# Patient Record
Sex: FEMALE | Race: WHITE | Employment: OTHER | URBAN - METROPOLITAN AREA
[De-identification: names, ages, dates, MRNs, and addresses within clinical notes are randomized per-mention and may not be internally consistent; named-entity substitution may affect disease eponyms.]

---

## 2018-12-01 ENCOUNTER — HOSPITAL ENCOUNTER (OUTPATIENT)
Dept: MRI IMAGING | Age: 73
Discharge: HOME | End: 2018-12-03
Payer: MEDICARE

## 2018-12-01 ENCOUNTER — HOSPITAL ENCOUNTER (OUTPATIENT)
Age: 73
Discharge: HOME | End: 2018-12-01
Payer: MEDICARE

## 2018-12-01 DIAGNOSIS — K86.9 LESION OF PANCREAS: ICD-10-CM

## 2018-12-01 LAB
BUN BLDV-MCNC: 12 MG/DL (ref 8–23)
CREAT SERPL-MCNC: 0.8 MG/DL (ref 0.5–1)
GFR AFRICAN AMERICAN: >60
GFR NON-AFRICAN AMERICAN: >60 ML/MIN/1.73

## 2018-12-01 PROCEDURE — 84520 ASSAY OF UREA NITROGEN: CPT

## 2018-12-01 PROCEDURE — 82565 ASSAY OF CREATININE: CPT

## 2018-12-01 PROCEDURE — 74181 MRI ABDOMEN W/O CONTRAST: CPT

## 2018-12-01 PROCEDURE — 36415 COLL VENOUS BLD VENIPUNCTURE: CPT

## 2019-07-09 ENCOUNTER — HOSPITAL ENCOUNTER (INPATIENT)
Age: 74
LOS: 2 days | Discharge: PSYCHIATRIC OR SUBSTANCE USE FACILITY | DRG: 312 | End: 2019-07-11
Attending: EMERGENCY MEDICINE | Admitting: INTERNAL MEDICINE
Payer: MEDICARE

## 2019-07-09 ENCOUNTER — APPOINTMENT (OUTPATIENT)
Dept: CT IMAGING | Age: 74
DRG: 312 | End: 2019-07-09
Payer: MEDICARE

## 2019-07-09 ENCOUNTER — APPOINTMENT (OUTPATIENT)
Dept: GENERAL RADIOLOGY | Age: 74
DRG: 312 | End: 2019-07-09
Payer: MEDICARE

## 2019-07-09 DIAGNOSIS — R41.82 ALTERED MENTAL STATUS, UNSPECIFIED ALTERED MENTAL STATUS TYPE: Primary | ICD-10-CM

## 2019-07-09 DIAGNOSIS — R55 SYNCOPE AND COLLAPSE: ICD-10-CM

## 2019-07-09 LAB
ALBUMIN SERPL-MCNC: 4.4 G/DL (ref 3.5–5.2)
ALP BLD-CCNC: 107 U/L (ref 35–104)
ALT SERPL-CCNC: 16 U/L (ref 0–32)
AMORPHOUS: NORMAL
ANION GAP SERPL CALCULATED.3IONS-SCNC: 14 MMOL/L (ref 7–16)
APTT: 31.2 SEC (ref 24.5–35.1)
AST SERPL-CCNC: 32 U/L (ref 0–31)
BACTERIA: NORMAL /HPF
BASOPHILS ABSOLUTE: 0.03 E9/L (ref 0–0.2)
BASOPHILS RELATIVE PERCENT: 0.4 % (ref 0–2)
BILIRUB SERPL-MCNC: 0.9 MG/DL (ref 0–1.2)
BILIRUBIN URINE: NEGATIVE
BLOOD, URINE: ABNORMAL
BUN BLDV-MCNC: 14 MG/DL (ref 8–23)
CALCIUM SERPL-MCNC: 10.2 MG/DL (ref 8.6–10.2)
CHLORIDE BLD-SCNC: 102 MMOL/L (ref 98–107)
CLARITY: CLEAR
CO2: 25 MMOL/L (ref 22–29)
COLOR: YELLOW
CREAT SERPL-MCNC: 0.8 MG/DL (ref 0.5–1)
EOSINOPHILS ABSOLUTE: 0.09 E9/L (ref 0.05–0.5)
EOSINOPHILS RELATIVE PERCENT: 1.1 % (ref 0–6)
GFR AFRICAN AMERICAN: >60
GFR NON-AFRICAN AMERICAN: >60 ML/MIN/1.73
GLUCOSE BLD-MCNC: 140 MG/DL (ref 74–99)
GLUCOSE URINE: NEGATIVE MG/DL
HCT VFR BLD CALC: 39.6 % (ref 34–48)
HEMOGLOBIN: 13.7 G/DL (ref 11.5–15.5)
IMMATURE GRANULOCYTES #: 0.02 E9/L
IMMATURE GRANULOCYTES %: 0.2 % (ref 0–5)
INR BLD: 1.2
KETONES, URINE: ABNORMAL MG/DL
LACTIC ACID, SEPSIS: 1.2 MMOL/L (ref 0.5–1.9)
LEUKOCYTE ESTERASE, URINE: NEGATIVE
LIPASE: 15 U/L (ref 13–60)
LYMPHOCYTES ABSOLUTE: 1.94 E9/L (ref 1.5–4)
LYMPHOCYTES RELATIVE PERCENT: 23.5 % (ref 20–42)
MCH RBC QN AUTO: 32.9 PG (ref 26–35)
MCHC RBC AUTO-ENTMCNC: 34.6 % (ref 32–34.5)
MCV RBC AUTO: 95.2 FL (ref 80–99.9)
MONOCYTES ABSOLUTE: 0.49 E9/L (ref 0.1–0.95)
MONOCYTES RELATIVE PERCENT: 5.9 % (ref 2–12)
NEUTROPHILS ABSOLUTE: 5.68 E9/L (ref 1.8–7.3)
NEUTROPHILS RELATIVE PERCENT: 68.9 % (ref 43–80)
NITRITE, URINE: NEGATIVE
PDW BLD-RTO: 12.5 FL (ref 11.5–15)
PH UA: 5.5 (ref 5–9)
PLATELET # BLD: 249 E9/L (ref 130–450)
PMV BLD AUTO: 10.2 FL (ref 7–12)
POTASSIUM REFLEX MAGNESIUM: 3.7 MMOL/L (ref 3.5–5)
PROTEIN UA: NEGATIVE MG/DL
PROTHROMBIN TIME: 14.1 SEC (ref 9.3–12.4)
RBC # BLD: 4.16 E12/L (ref 3.5–5.5)
RBC UA: NORMAL /HPF (ref 0–2)
SODIUM BLD-SCNC: 141 MMOL/L (ref 132–146)
SPECIFIC GRAVITY UA: 1.01 (ref 1–1.03)
TOTAL PROTEIN: 7.2 G/DL (ref 6.4–8.3)
TROPONIN: <0.01 NG/ML (ref 0–0.03)
UROBILINOGEN, URINE: 1 E.U./DL
WBC # BLD: 8.3 E9/L (ref 4.5–11.5)
WBC UA: NORMAL /HPF (ref 0–5)

## 2019-07-09 PROCEDURE — 71275 CT ANGIOGRAPHY CHEST: CPT

## 2019-07-09 PROCEDURE — 71045 X-RAY EXAM CHEST 1 VIEW: CPT

## 2019-07-09 PROCEDURE — 87040 BLOOD CULTURE FOR BACTERIA: CPT

## 2019-07-09 PROCEDURE — 74177 CT ABD & PELVIS W/CONTRAST: CPT

## 2019-07-09 PROCEDURE — 85025 COMPLETE CBC W/AUTO DIFF WBC: CPT

## 2019-07-09 PROCEDURE — 2580000003 HC RX 258: Performed by: EMERGENCY MEDICINE

## 2019-07-09 PROCEDURE — 85730 THROMBOPLASTIN TIME PARTIAL: CPT

## 2019-07-09 PROCEDURE — 80053 COMPREHEN METABOLIC PANEL: CPT

## 2019-07-09 PROCEDURE — 93005 ELECTROCARDIOGRAM TRACING: CPT | Performed by: EMERGENCY MEDICINE

## 2019-07-09 PROCEDURE — 81001 URINALYSIS AUTO W/SCOPE: CPT

## 2019-07-09 PROCEDURE — 83690 ASSAY OF LIPASE: CPT

## 2019-07-09 PROCEDURE — 99285 EMERGENCY DEPT VISIT HI MDM: CPT

## 2019-07-09 PROCEDURE — 2580000003 HC RX 258: Performed by: RADIOLOGY

## 2019-07-09 PROCEDURE — 83605 ASSAY OF LACTIC ACID: CPT

## 2019-07-09 PROCEDURE — 85610 PROTHROMBIN TIME: CPT

## 2019-07-09 PROCEDURE — 70450 CT HEAD/BRAIN W/O DYE: CPT

## 2019-07-09 PROCEDURE — 84484 ASSAY OF TROPONIN QUANT: CPT

## 2019-07-09 PROCEDURE — 36415 COLL VENOUS BLD VENIPUNCTURE: CPT

## 2019-07-09 PROCEDURE — 6360000004 HC RX CONTRAST MEDICATION: Performed by: RADIOLOGY

## 2019-07-09 PROCEDURE — 2060000000 HC ICU INTERMEDIATE R&B

## 2019-07-09 RX ORDER — SODIUM CHLORIDE 0.9 % (FLUSH) 0.9 %
10 SYRINGE (ML) INJECTION PRN
Status: DISCONTINUED | OUTPATIENT
Start: 2019-07-09 | End: 2019-07-11 | Stop reason: HOSPADM

## 2019-07-09 RX ORDER — LEVOTHYROXINE SODIUM 0.07 MG/1
75 TABLET ORAL DAILY
Status: DISCONTINUED | OUTPATIENT
Start: 2019-07-10 | End: 2019-07-11 | Stop reason: HOSPADM

## 2019-07-09 RX ORDER — 0.9 % SODIUM CHLORIDE 0.9 %
1000 INTRAVENOUS SOLUTION INTRAVENOUS ONCE
Status: COMPLETED | OUTPATIENT
Start: 2019-07-09 | End: 2019-07-09

## 2019-07-09 RX ORDER — MULTIVITAMIN WITH FOLIC ACID 400 MCG
1 TABLET ORAL DAILY
Status: DISCONTINUED | OUTPATIENT
Start: 2019-07-10 | End: 2019-07-11 | Stop reason: HOSPADM

## 2019-07-09 RX ORDER — PANTOPRAZOLE SODIUM 40 MG/1
40 TABLET, DELAYED RELEASE ORAL
Status: DISCONTINUED | OUTPATIENT
Start: 2019-07-10 | End: 2019-07-11 | Stop reason: HOSPADM

## 2019-07-09 RX ORDER — DEXTROSE MONOHYDRATE 50 MG/ML
100 INJECTION, SOLUTION INTRAVENOUS PRN
Status: DISCONTINUED | OUTPATIENT
Start: 2019-07-09 | End: 2019-07-11 | Stop reason: HOSPADM

## 2019-07-09 RX ORDER — INSULIN GLARGINE 100 [IU]/ML
4 INJECTION, SOLUTION SUBCUTANEOUS DAILY
Status: DISCONTINUED | OUTPATIENT
Start: 2019-07-10 | End: 2019-07-11 | Stop reason: HOSPADM

## 2019-07-09 RX ORDER — LISINOPRIL AND HYDROCHLOROTHIAZIDE 25; 20 MG/1; MG/1
1 TABLET ORAL DAILY
Status: DISCONTINUED | OUTPATIENT
Start: 2019-07-10 | End: 2019-07-09 | Stop reason: SDUPTHER

## 2019-07-09 RX ORDER — SODIUM CHLORIDE 9 MG/ML
INJECTION, SOLUTION INTRAVENOUS CONTINUOUS
Status: DISCONTINUED | OUTPATIENT
Start: 2019-07-09 | End: 2019-07-10

## 2019-07-09 RX ORDER — DEXTROSE MONOHYDRATE 25 G/50ML
12.5 INJECTION, SOLUTION INTRAVENOUS PRN
Status: DISCONTINUED | OUTPATIENT
Start: 2019-07-09 | End: 2019-07-11 | Stop reason: HOSPADM

## 2019-07-09 RX ORDER — NICOTINE POLACRILEX 4 MG
15 LOZENGE BUCCAL PRN
Status: DISCONTINUED | OUTPATIENT
Start: 2019-07-09 | End: 2019-07-11 | Stop reason: HOSPADM

## 2019-07-09 RX ORDER — ONDANSETRON 4 MG/1
4 TABLET, FILM COATED ORAL EVERY 8 HOURS PRN
Status: DISCONTINUED | OUTPATIENT
Start: 2019-07-09 | End: 2019-07-11 | Stop reason: HOSPADM

## 2019-07-09 RX ORDER — HYDROCHLOROTHIAZIDE 25 MG/1
25 TABLET ORAL DAILY
Status: DISCONTINUED | OUTPATIENT
Start: 2019-07-10 | End: 2019-07-11 | Stop reason: HOSPADM

## 2019-07-09 RX ORDER — LISINOPRIL 20 MG/1
20 TABLET ORAL DAILY
Status: DISCONTINUED | OUTPATIENT
Start: 2019-07-10 | End: 2019-07-11 | Stop reason: HOSPADM

## 2019-07-09 RX ADMIN — IOPAMIDOL 110 ML: 755 INJECTION, SOLUTION INTRAVENOUS at 17:42

## 2019-07-09 RX ADMIN — Medication 10 ML: at 17:42

## 2019-07-09 RX ADMIN — SODIUM CHLORIDE 1000 ML: 9 INJECTION, SOLUTION INTRAVENOUS at 16:45

## 2019-07-09 ASSESSMENT — PAIN DESCRIPTION - ORIENTATION: ORIENTATION: LOWER

## 2019-07-09 ASSESSMENT — ENCOUNTER SYMPTOMS
DIARRHEA: 1
ABDOMINAL PAIN: 1
RESPIRATORY NEGATIVE: 1

## 2019-07-09 ASSESSMENT — PAIN SCALES - GENERAL
PAINLEVEL_OUTOF10: 9
PAINLEVEL_OUTOF10: 9

## 2019-07-09 ASSESSMENT — PAIN DESCRIPTION - PAIN TYPE: TYPE: CHRONIC PAIN

## 2019-07-09 ASSESSMENT — PAIN DESCRIPTION - LOCATION
LOCATION: ABDOMEN
LOCATION: BACK

## 2019-07-09 NOTE — ED PROVIDER NOTES
69 y.o female with history of HTN and DM presents for altered mental status. Family states she has been acting confused. She is alert, but her conversations don't make sense. Pt family also reports multiple syncopal episodes over the last few days. They state she has had an unintentional weight loss over the last year and has not been eating much. Pt also complains of abdominal pain and diarrhea. She has history of open heart surgery, cholecystectomy, and appendectomy. She is a smoker. Pt denies fever, lightheadedness, hematochezia, nausea, vomiting, dysuria, constipation, vaginal bleeding or discharge,  headache, cough. The history is provided by the patient. No  was used. Abdominal Pain   Pain location:  Generalized  Pain radiates to:  Does not radiate  Pain severity:  Moderate  Chronicity:  New  Context: diet changes    Context: not eating    Associated symptoms: diarrhea    Altered Mental Status   Presenting symptoms: behavior changes and confusion    Severity:  Moderate  Most recent episode: Today  Episode history:  Continuous  Duration:  2 days  Timing:  Constant  Progression:  Unchanged  Chronicity:  New  Associated symptoms: abdominal pain and decreased appetite    Loss of Consciousness   Episode history:  Multiple  Progression:  Unchanged  Chronicity:  New  Witnessed: yes    Associated symptoms: confusion        Review of Systems   Constitutional: Positive for decreased appetite. Respiratory: Negative. Cardiovascular: Positive for syncope. Gastrointestinal: Positive for abdominal pain and diarrhea. Genitourinary: Negative. Psychiatric/Behavioral: Positive for confusion. All other systems reviewed and are negative. Physical Exam   Constitutional: She appears distressed (mild). Weak, lethargic. Eyes: Pupils are equal, round, and reactive to light. EOM are normal.   Cardiovascular: Regular rhythm, normal heart sounds and intact distal pulses.  Exam reveals no gallop and no friction rub. No murmur heard. Tachycardic. Pulmonary/Chest: Effort normal and breath sounds normal. No stridor. No respiratory distress. She has no wheezes. She has no rales. Abdominal: Soft. Bowel sounds are normal. She exhibits no distension and no mass. There is no tenderness (diffuse). There is no rebound and no guarding. No hernia. Musculoskeletal:   Right lower leg decreased ROM from prior injury. Neurological: She is alert. No cranial nerve deficit or sensory deficit. Coordination normal.   Oriented to time, place, but not person. Procedures    MDM  Number of Diagnoses or Management Options  Altered mental status, unspecified altered mental status type: new and requires workup  Syncope and collapse: new and requires workup  Diagnosis management comments: Differential diagnoses include syncope, altered mental status, UTI, CVA, TIA, artery disease, MI. Amount and/or Complexity of Data Reviewed  Clinical lab tests: reviewed and ordered  Tests in the radiology section of CPT®: reviewed and ordered  Tests in the medicine section of CPT®: ordered and reviewed  Discuss the patient with other providers: yes (Case discussed with . Will admit to service.)  Independent visualization of images, tracings, or specimens: yes    Risk of Complications, Morbidity, and/or Mortality  Presenting problems: high  Diagnostic procedures: high  Management options: high  General comments: Patient remained stable throughout the course of treatment. IV fluid was given. Labs were essentially unremarkable. CAT scan of the head was negative. Case discussed with admitting doctor. Will admit the patient.              Labs      Radiology      EKG Interpretation.          --------------------------------------------- PAST HISTORY ---------------------------------------------  Past Medical History:  has a past medical history of Arthritis, Depression, Diabetes mellitus ([address] and Calcium 10.2 8.6 - 10.2 mg/dL    Total Protein 7.2 6.4 - 8.3 g/dL    Alb 4.4 3.5 - 5.2 g/dL    Total Bilirubin 0.9 0.0 - 1.2 mg/dL    Alkaline Phosphatase 107 (H) 35 - 104 U/L    ALT 16 0 - 32 U/L    AST 32 (H) 0 - 31 U/L   Lipase   Result Value Ref Range    Lipase 15 13 - 60 U/L   Urinalysis, reflex to microscopic   Result Value Ref Range    Color, UA Yellow Straw/Yellow    Clarity, UA Clear Clear    Glucose, Ur Negative Negative mg/dL    Bilirubin Urine Negative Negative    Ketones, Urine TRACE (A) Negative mg/dL    Specific Gravity, UA 1.010 1.005 - 1.030    Blood, Urine SMALL (A) Negative    pH, UA 5.5 5.0 - 9.0    Protein, UA Negative Negative mg/dL    Urobilinogen, Urine 1.0 <2.0 E.U./dL    Nitrite, Urine Negative Negative    Leukocyte Esterase, Urine Negative Negative   Troponin   Result Value Ref Range    Troponin <0.01 0.00 - 0.03 ng/mL   Protime-INR   Result Value Ref Range    Protime 14.1 (H) 9.3 - 12.4 sec    INR 1.2    APTT   Result Value Ref Range    aPTT 31.2 24.5 - 35.1 sec   Lactate, Sepsis   Result Value Ref Range    Lactic Acid, Sepsis 1.2 0.5 - 1.9 mmol/L   Microscopic Urinalysis   Result Value Ref Range    WBC, UA NONE 0 - 5 /HPF    RBC, UA 2-5 0 - 2 /HPF    Bacteria, UA NONE /HPF    Amorphous, UA FEW    POCT Glucose   Result Value Ref Range    Meter Glucose 83 74 - 99 mg/dL       RADIOLOGY:  CT Head WO Contrast   Final Result   Mild atrophy and chronic microvascular ischemic disease. CT ABDOMEN PELVIS W IV CONTRAST Additional Contrast? None   Final Result   No central pulmonary embolism or aortic dissection. There is COPD and   coronary artery calcification. Likely liver disease with portal venous hypertension and persistent   hemangioma in the left hepatic lobe. A low-attenuation lesion is also   identified in the pancreatic head which is unchanged and surveillance   recommended. Redundant colon with constipation.  Small amount of air is identified   in the periphery of ascending colon likely trapped air between the   fecal matter in the bowel. Pneumatosis is less likely. If colon needs   further assessment consider colonoscopy. CTA CHEST W CONTRAST   Final Result   No central pulmonary embolism or aortic dissection. There is COPD and   coronary artery calcification. Likely liver disease with portal venous hypertension and persistent   hemangioma in the left hepatic lobe. A low-attenuation lesion is also   identified in the pancreatic head which is unchanged and surveillance   recommended. Redundant colon with constipation. Small amount of air is identified   in the periphery of ascending colon likely trapped air between the   fecal matter in the bowel. Pneumatosis is less likely. If colon needs   further assessment consider colonoscopy. XR CHEST PORTABLE   Final Result   No acute process                   EKG:  This EKG is signed and interpreted by me.        ------------------------- NURSING NOTES AND VITALS REVIEWED ---------------------------  Date / Time Roomed:  7/9/2019  3:50 PM  ED Bed Assignment:  [id number]/[id number]    The nursing notes within the ED encounter and vital signs as below have been reviewed.      Patient Vitals for the past 24 hrs:   BP Temp Temp src Pulse Resp SpO2 Height Weight   07/09/19 2330 136/65 98 °F (36.7 °C) Temporal 59 16 100 % -- --   07/09/19 2248 126/67 98.4 °F (36.9 °C) -- 57 14 95 % -- --   07/09/19 1939 (!) 144/72 98.4 °F (36.9 °C) Oral 59 16 99 % -- --   07/09/19 1557 125/79 97 °F (36.1 °C) -- 75 18 96 % 5' (1.524 m) 113 lb (51.3 kg)       Oxygen Saturation Interpretation: Normal    ------------------------------------------ PROGRESS NOTES ------------------------------------------  Re-evaluation(s):  Time:   Patients symptoms show no change  Repeat physical examination is not changed    Counseling:  I have spoken with the patient and discussed todays results, in addition to providing specific details for the plan of

## 2019-07-10 LAB
ANION GAP SERPL CALCULATED.3IONS-SCNC: 12 MMOL/L (ref 7–16)
BASOPHILS ABSOLUTE: 0.04 E9/L (ref 0–0.2)
BASOPHILS RELATIVE PERCENT: 0.6 % (ref 0–2)
BUN BLDV-MCNC: 14 MG/DL (ref 8–23)
CALCIUM SERPL-MCNC: 9 MG/DL (ref 8.6–10.2)
CHLORIDE BLD-SCNC: 105 MMOL/L (ref 98–107)
CO2: 25 MMOL/L (ref 22–29)
CREAT SERPL-MCNC: 0.7 MG/DL (ref 0.5–1)
EKG ATRIAL RATE: 61 BPM
EKG P AXIS: 79 DEGREES
EKG P-R INTERVAL: 196 MS
EKG Q-T INTERVAL: 416 MS
EKG QRS DURATION: 72 MS
EKG QTC CALCULATION (BAZETT): 418 MS
EKG R AXIS: 73 DEGREES
EKG T AXIS: 77 DEGREES
EKG VENTRICULAR RATE: 61 BPM
EOSINOPHILS ABSOLUTE: 0.29 E9/L (ref 0.05–0.5)
EOSINOPHILS RELATIVE PERCENT: 4.2 % (ref 0–6)
GFR AFRICAN AMERICAN: >60
GFR NON-AFRICAN AMERICAN: >60 ML/MIN/1.73
GLUCOSE BLD-MCNC: 83 MG/DL (ref 74–99)
HCT VFR BLD CALC: 36.3 % (ref 34–48)
HEMOGLOBIN: 12.4 G/DL (ref 11.5–15.5)
IMMATURE GRANULOCYTES #: 0.01 E9/L
IMMATURE GRANULOCYTES %: 0.1 % (ref 0–5)
LYMPHOCYTES ABSOLUTE: 1.93 E9/L (ref 1.5–4)
LYMPHOCYTES RELATIVE PERCENT: 27.8 % (ref 20–42)
MCH RBC QN AUTO: 32.4 PG (ref 26–35)
MCHC RBC AUTO-ENTMCNC: 34.2 % (ref 32–34.5)
MCV RBC AUTO: 94.8 FL (ref 80–99.9)
METER GLUCOSE: 79 MG/DL (ref 74–99)
METER GLUCOSE: 80 MG/DL (ref 74–99)
METER GLUCOSE: 81 MG/DL (ref 74–99)
METER GLUCOSE: 83 MG/DL (ref 74–99)
METER GLUCOSE: 92 MG/DL (ref 74–99)
MONOCYTES ABSOLUTE: 0.53 E9/L (ref 0.1–0.95)
MONOCYTES RELATIVE PERCENT: 7.6 % (ref 2–12)
NEUTROPHILS ABSOLUTE: 4.13 E9/L (ref 1.8–7.3)
NEUTROPHILS RELATIVE PERCENT: 59.7 % (ref 43–80)
PDW BLD-RTO: 12.6 FL (ref 11.5–15)
PLATELET # BLD: 236 E9/L (ref 130–450)
PMV BLD AUTO: 10.3 FL (ref 7–12)
POTASSIUM SERPL-SCNC: 3.1 MMOL/L (ref 3.5–5)
RBC # BLD: 3.83 E12/L (ref 3.5–5.5)
SODIUM BLD-SCNC: 142 MMOL/L (ref 132–146)
WBC # BLD: 6.9 E9/L (ref 4.5–11.5)

## 2019-07-10 PROCEDURE — 2060000000 HC ICU INTERMEDIATE R&B

## 2019-07-10 PROCEDURE — 99221 1ST HOSP IP/OBS SF/LOW 40: CPT | Performed by: PSYCHIATRY & NEUROLOGY

## 2019-07-10 PROCEDURE — 36415 COLL VENOUS BLD VENIPUNCTURE: CPT

## 2019-07-10 PROCEDURE — 85025 COMPLETE CBC W/AUTO DIFF WBC: CPT

## 2019-07-10 PROCEDURE — 51702 INSERT TEMP BLADDER CATH: CPT

## 2019-07-10 PROCEDURE — 87040 BLOOD CULTURE FOR BACTERIA: CPT

## 2019-07-10 PROCEDURE — 93010 ELECTROCARDIOGRAM REPORT: CPT | Performed by: INTERNAL MEDICINE

## 2019-07-10 PROCEDURE — 2580000003 HC RX 258: Performed by: INTERNAL MEDICINE

## 2019-07-10 PROCEDURE — 82962 GLUCOSE BLOOD TEST: CPT

## 2019-07-10 PROCEDURE — 6370000000 HC RX 637 (ALT 250 FOR IP): Performed by: INTERNAL MEDICINE

## 2019-07-10 PROCEDURE — 6360000002 HC RX W HCPCS: Performed by: INTERNAL MEDICINE

## 2019-07-10 PROCEDURE — 80048 BASIC METABOLIC PNL TOTAL CA: CPT

## 2019-07-10 RX ORDER — ARIPIPRAZOLE 5 MG/1
5 TABLET ORAL DAILY
Status: ON HOLD | COMMUNITY
End: 2021-12-20 | Stop reason: HOSPADM

## 2019-07-10 RX ORDER — ASPIRIN 81 MG/1
81 TABLET, CHEWABLE ORAL DAILY
Status: DISCONTINUED | OUTPATIENT
Start: 2019-07-10 | End: 2019-07-11 | Stop reason: HOSPADM

## 2019-07-10 RX ORDER — DICYCLOMINE HYDROCHLORIDE 10 MG/1
10 CAPSULE ORAL
COMMUNITY

## 2019-07-10 RX ORDER — BACLOFEN 20 MG/1
20 TABLET ORAL NIGHTLY
COMMUNITY
End: 2021-06-14

## 2019-07-10 RX ORDER — BUSPIRONE HYDROCHLORIDE 15 MG/1
7.5 TABLET ORAL 3 TIMES DAILY
Status: ON HOLD | COMMUNITY
End: 2021-12-20 | Stop reason: HOSPADM

## 2019-07-10 RX ORDER — METAXALONE 800 MG/1
800 TABLET ORAL 3 TIMES DAILY
Status: ON HOLD | COMMUNITY
End: 2019-07-11 | Stop reason: HOSPADM

## 2019-07-10 RX ORDER — METOPROLOL SUCCINATE 25 MG/1
25 TABLET, EXTENDED RELEASE ORAL DAILY
Status: DISCONTINUED | OUTPATIENT
Start: 2019-07-10 | End: 2019-07-11 | Stop reason: HOSPADM

## 2019-07-10 RX ORDER — ATORVASTATIN CALCIUM 20 MG/1
20 TABLET, FILM COATED ORAL DAILY
COMMUNITY
End: 2021-06-14

## 2019-07-10 RX ORDER — SODIUM CHLORIDE AND POTASSIUM CHLORIDE .9; .15 G/100ML; G/100ML
SOLUTION INTRAVENOUS CONTINUOUS
Status: DISCONTINUED | OUTPATIENT
Start: 2019-07-10 | End: 2019-07-11 | Stop reason: HOSPADM

## 2019-07-10 RX ORDER — ATORVASTATIN CALCIUM 10 MG/1
20 TABLET, FILM COATED ORAL DAILY
Status: DISCONTINUED | OUTPATIENT
Start: 2019-07-10 | End: 2019-07-11 | Stop reason: HOSPADM

## 2019-07-10 RX ORDER — LORAZEPAM 0.5 MG/1
0.5 TABLET ORAL 2 TIMES DAILY
Status: ON HOLD | COMMUNITY
End: 2019-07-11 | Stop reason: HOSPADM

## 2019-07-10 RX ORDER — METOPROLOL SUCCINATE 25 MG/1
25 TABLET, EXTENDED RELEASE ORAL DAILY
COMMUNITY

## 2019-07-10 RX ORDER — BACLOFEN 10 MG/1
20 TABLET ORAL NIGHTLY
Status: DISCONTINUED | OUTPATIENT
Start: 2019-07-10 | End: 2019-07-11 | Stop reason: HOSPADM

## 2019-07-10 RX ORDER — POTASSIUM CHLORIDE 20 MEQ/1
20 TABLET, EXTENDED RELEASE ORAL ONCE
Status: COMPLETED | OUTPATIENT
Start: 2019-07-10 | End: 2019-07-10

## 2019-07-10 RX ADMIN — BACLOFEN 20 MG: 10 TABLET ORAL at 23:10

## 2019-07-10 RX ADMIN — LISINOPRIL 20 MG: 20 TABLET ORAL at 08:06

## 2019-07-10 RX ADMIN — POTASSIUM CHLORIDE 20 MEQ: 20 TABLET, EXTENDED RELEASE ORAL at 10:11

## 2019-07-10 RX ADMIN — HYDROCHLOROTHIAZIDE 25 MG: 25 TABLET ORAL at 08:06

## 2019-07-10 RX ADMIN — LEVOTHYROXINE SODIUM 75 MCG: 75 TABLET ORAL at 06:00

## 2019-07-10 RX ADMIN — PANTOPRAZOLE SODIUM 40 MG: 40 TABLET, DELAYED RELEASE ORAL at 06:00

## 2019-07-10 RX ADMIN — SODIUM CHLORIDE: 9 INJECTION, SOLUTION INTRAVENOUS at 00:00

## 2019-07-10 RX ADMIN — MULTIVITAMIN TABLET 1 TABLET: TABLET at 08:06

## 2019-07-10 RX ADMIN — SODIUM CHLORIDE AND POTASSIUM CHLORIDE: .9; .15 SOLUTION INTRAVENOUS at 23:09

## 2019-07-10 RX ADMIN — PANCRELIPASE 3 CAPSULE: 60000; 12000; 38000 CAPSULE, DELAYED RELEASE PELLETS ORAL at 18:53

## 2019-07-10 RX ADMIN — SODIUM CHLORIDE AND POTASSIUM CHLORIDE: .9; .15 SOLUTION INTRAVENOUS at 10:11

## 2019-07-10 RX ADMIN — ATORVASTATIN CALCIUM 20 MG: 10 TABLET, FILM COATED ORAL at 15:19

## 2019-07-10 RX ADMIN — ASPIRIN 81 MG 81 MG: 81 TABLET ORAL at 15:19

## 2019-07-10 RX ADMIN — METOPROLOL SUCCINATE 25 MG: 25 TABLET, FILM COATED, EXTENDED RELEASE ORAL at 15:19

## 2019-07-10 ASSESSMENT — PAIN DESCRIPTION - LOCATION
LOCATION: BACK
LOCATION: BACK

## 2019-07-10 ASSESSMENT — PAIN DESCRIPTION - PAIN TYPE
TYPE: CHRONIC PAIN
TYPE: CHRONIC PAIN

## 2019-07-10 ASSESSMENT — PAIN SCALES - GENERAL
PAINLEVEL_OUTOF10: 10
PAINLEVEL_OUTOF10: 0
PAINLEVEL_OUTOF10: 10
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
## 2019-07-10 NOTE — PROGRESS NOTES
Pt did not void this shift, pt has c/o minimal lower abd discomfort, and has the sensation to urinate, but unable to. Bladder scan performed with result of 570ml. Will inform Dr Do Standing of results.

## 2019-07-10 NOTE — PROGRESS NOTES
Unable to confirm home medications with pt r/t her AMS, attempted to call , voicemail left. Did speak with pt's sister, she states she does not know what medications she takes, but will obtain the list in the morning and either call, or bring it here.

## 2019-07-10 NOTE — PROGRESS NOTES
Dr. Jenaro Cordero notified of patient's bladder scan results and patient still uable to urinate at the beginning of this shift.  Electronically signed by Yuan Lo RN on 7/10/2019 at 8:12 AM

## 2019-07-11 VITALS
SYSTOLIC BLOOD PRESSURE: 134 MMHG | BODY MASS INDEX: 24.35 KG/M2 | WEIGHT: 124 LBS | HEART RATE: 53 BPM | DIASTOLIC BLOOD PRESSURE: 65 MMHG | HEIGHT: 60 IN | RESPIRATION RATE: 16 BRPM | TEMPERATURE: 98.3 F | OXYGEN SATURATION: 98 %

## 2019-07-11 PROBLEM — R41.82 ALTERED MENTAL STATUS: Status: RESOLVED | Noted: 2019-07-09 | Resolved: 2019-07-11

## 2019-07-11 PROBLEM — E43 SEVERE PROTEIN-CALORIE MALNUTRITION (HCC): Chronic | Status: ACTIVE | Noted: 2019-07-11

## 2019-07-11 LAB
ANION GAP SERPL CALCULATED.3IONS-SCNC: 17 MMOL/L (ref 7–16)
BASOPHILS ABSOLUTE: 0.03 E9/L (ref 0–0.2)
BASOPHILS RELATIVE PERCENT: 0.3 % (ref 0–2)
BUN BLDV-MCNC: 13 MG/DL (ref 8–23)
CALCIUM SERPL-MCNC: 8.8 MG/DL (ref 8.6–10.2)
CHLORIDE BLD-SCNC: 103 MMOL/L (ref 98–107)
CO2: 20 MMOL/L (ref 22–29)
CREAT SERPL-MCNC: 0.7 MG/DL (ref 0.5–1)
EOSINOPHILS ABSOLUTE: 0.08 E9/L (ref 0.05–0.5)
EOSINOPHILS RELATIVE PERCENT: 0.8 % (ref 0–6)
GFR AFRICAN AMERICAN: >60
GFR NON-AFRICAN AMERICAN: >60 ML/MIN/1.73
GLUCOSE BLD-MCNC: 122 MG/DL (ref 74–99)
HCT VFR BLD CALC: 38.7 % (ref 34–48)
HEMOGLOBIN: 13.3 G/DL (ref 11.5–15.5)
IMMATURE GRANULOCYTES #: 0.05 E9/L
IMMATURE GRANULOCYTES %: 0.5 % (ref 0–5)
LYMPHOCYTES ABSOLUTE: 1.18 E9/L (ref 1.5–4)
LYMPHOCYTES RELATIVE PERCENT: 11.1 % (ref 20–42)
MCH RBC QN AUTO: 32.6 PG (ref 26–35)
MCHC RBC AUTO-ENTMCNC: 34.4 % (ref 32–34.5)
MCV RBC AUTO: 94.9 FL (ref 80–99.9)
METER GLUCOSE: 103 MG/DL (ref 74–99)
METER GLUCOSE: 110 MG/DL (ref 74–99)
METER GLUCOSE: 124 MG/DL (ref 74–99)
METER GLUCOSE: 151 MG/DL (ref 74–99)
METER GLUCOSE: 73 MG/DL (ref 74–99)
METER GLUCOSE: 99 MG/DL (ref 74–99)
MONOCYTES ABSOLUTE: 0.37 E9/L (ref 0.1–0.95)
MONOCYTES RELATIVE PERCENT: 3.5 % (ref 2–12)
NEUTROPHILS ABSOLUTE: 8.95 E9/L (ref 1.8–7.3)
NEUTROPHILS RELATIVE PERCENT: 83.8 % (ref 43–80)
PDW BLD-RTO: 12.5 FL (ref 11.5–15)
PLATELET # BLD: 261 E9/L (ref 130–450)
PMV BLD AUTO: 10.1 FL (ref 7–12)
POTASSIUM SERPL-SCNC: 3.6 MMOL/L (ref 3.5–5)
RBC # BLD: 4.08 E12/L (ref 3.5–5.5)
SODIUM BLD-SCNC: 140 MMOL/L (ref 132–146)
WBC # BLD: 10.7 E9/L (ref 4.5–11.5)

## 2019-07-11 PROCEDURE — 36415 COLL VENOUS BLD VENIPUNCTURE: CPT

## 2019-07-11 PROCEDURE — APPSS60 APP SPLIT SHARED TIME 46-60 MINUTES: Performed by: NURSE PRACTITIONER

## 2019-07-11 PROCEDURE — 99222 1ST HOSP IP/OBS MODERATE 55: CPT | Performed by: INTERNAL MEDICINE

## 2019-07-11 PROCEDURE — 80048 BASIC METABOLIC PNL TOTAL CA: CPT

## 2019-07-11 PROCEDURE — 85025 COMPLETE CBC W/AUTO DIFF WBC: CPT

## 2019-07-11 PROCEDURE — 82962 GLUCOSE BLOOD TEST: CPT

## 2019-07-11 PROCEDURE — 6370000000 HC RX 637 (ALT 250 FOR IP): Performed by: INTERNAL MEDICINE

## 2019-07-11 RX ADMIN — INSULIN GLARGINE 4 UNITS: 100 INJECTION, SOLUTION SUBCUTANEOUS at 09:15

## 2019-07-11 RX ADMIN — PANCRELIPASE 3 CAPSULE: 60000; 12000; 38000 CAPSULE, DELAYED RELEASE PELLETS ORAL at 12:16

## 2019-07-11 RX ADMIN — HYDROCHLOROTHIAZIDE 25 MG: 25 TABLET ORAL at 09:12

## 2019-07-11 RX ADMIN — PANTOPRAZOLE SODIUM 40 MG: 40 TABLET, DELAYED RELEASE ORAL at 06:16

## 2019-07-11 RX ADMIN — METOPROLOL SUCCINATE 25 MG: 25 TABLET, FILM COATED, EXTENDED RELEASE ORAL at 09:12

## 2019-07-11 RX ADMIN — PANCRELIPASE 3 CAPSULE: 60000; 12000; 38000 CAPSULE, DELAYED RELEASE PELLETS ORAL at 17:33

## 2019-07-11 RX ADMIN — LISINOPRIL 20 MG: 20 TABLET ORAL at 09:12

## 2019-07-11 RX ADMIN — PANCRELIPASE 3 CAPSULE: 60000; 12000; 38000 CAPSULE, DELAYED RELEASE PELLETS ORAL at 09:12

## 2019-07-11 RX ADMIN — LEVOTHYROXINE SODIUM 75 MCG: 75 TABLET ORAL at 06:16

## 2019-07-11 RX ADMIN — ATORVASTATIN CALCIUM 20 MG: 10 TABLET, FILM COATED ORAL at 09:11

## 2019-07-11 RX ADMIN — ASPIRIN 81 MG 81 MG: 81 TABLET ORAL at 09:10

## 2019-07-11 RX ADMIN — MULTIVITAMIN TABLET 1 TABLET: TABLET at 09:12

## 2019-07-11 ASSESSMENT — PAIN SCALES - GENERAL
PAINLEVEL_OUTOF10: 1
PAINLEVEL_OUTOF10: 0

## 2019-07-11 NOTE — PLAN OF CARE
Problem: Malnutrition  (NI-5.2)  Goal: Food and/or Nutrient Delivery  Description - Continue diet and monitor PO intake  Individualized approach for food/nutrient provision.   Outcome: Met This Shift

## 2019-07-11 NOTE — PROGRESS NOTES
Subjective:    Chief complaint:    Strange behavior continues per nursing    Objective:    /63   Pulse 70   Temp 96.4 °F (35.8 °C) (Oral)   Resp 16   Ht 5' (1.524 m)   Wt 113 lb (51.3 kg)   SpO2 98%   BMI 22.07 kg/m²   General : Awake ,alert,no distress. Heart:  RRR, no murmurs, gallops, or rubs. Lungs:  CTA bilaterally, no wheeze, rales or rhonchi  Abd: bowel sounds present, nontender, nondistended, no masses  Extrem:  No clubbing, cyanosis, or edema    CBC:   Lab Results   Component Value Date    WBC 10.7 07/11/2019    RBC 4.08 07/11/2019    HGB 13.3 07/11/2019    HCT 38.7 07/11/2019    MCV 94.9 07/11/2019    MCH 32.6 07/11/2019    MCHC 34.4 07/11/2019    RDW 12.5 07/11/2019     07/11/2019    MPV 10.1 07/11/2019     BMP:    Lab Results   Component Value Date     07/11/2019    K 3.6 07/11/2019    K 3.7 07/09/2019     07/11/2019    CO2 20 07/11/2019    BUN 13 07/11/2019    LABALBU 4.4 07/09/2019    CREATININE 0.7 07/11/2019    CALCIUM 8.8 07/11/2019    GFRAA >60 07/11/2019    LABGLOM >60 07/11/2019    GLUCOSE 122 07/11/2019     PT/INR:    Lab Results   Component Value Date    PROTIME 14.1 07/09/2019    INR 1.2 07/09/2019     Troponin:    Lab Results   Component Value Date    TROPONINI <0.01 07/09/2019       No results for input(s): LABURIN in the last 72 hours. Recent Labs     07/09/19  1602   BC 24 Hours- no growth     Recent Labs     07/10/19  0417   BLOODCULT2 24 Hours- no growth         Current Facility-Administered Medications:     perflutren lipid microspheres (DEFINITY) injection 1.65 mg, 1.5 mL, Intravenous, ONCE PRN, Vernel Goltz.  KATERINA Kearney    0.9% NaCl with KCl 20 mEq infusion, , Intravenous, Continuous, Angela Todd MD, Last Rate: 75 mL/hr at 07/10/19 2309    aspirin chewable tablet 81 mg, 81 mg, Oral, Daily, Angela Todd MD, 81 mg at 07/11/19 0910    atorvastatin (LIPITOR) tablet 20 mg, 20 mg, Oral, Daily, Angela Todd MD, 20 mg at 07/11/19 0911    baclofen (LIORESAL) tablet 20 mg, 20 mg, Oral, Nightly, Steve Diaz MD, 20 mg at 07/10/19 2310    lipase-protease-amylase (CREON) 40941 units delayed release capsule 3 capsule, 3 capsule, Oral, TID , Steve Diaz MD, 3 capsule at 07/11/19 0912    metoprolol succinate (TOPROL XL) extended release tablet 25 mg, 25 mg, Oral, Daily, Steve Diaz MD, 25 mg at 07/11/19 0912    sodium chloride flush 0.9 % injection 10 mL, 10 mL, Intravenous, PRN, Claudia Rodriguez II, MD, 10 mL at 07/09/19 1742    insulin glargine (LANTUS) injection vial 4 Units, 4 Units, Subcutaneous, Daily, Steve Diaz MD, 4 Units at 07/11/19 0915    levothyroxine (SYNTHROID) tablet 75 mcg, 75 mcg, Oral, Daily, Steve Diaz MD, 75 mcg at 07/11/19 0616    pantoprazole (PROTONIX) tablet 40 mg, 40 mg, Oral, QAM AC, Steve Diaz MD, 40 mg at 07/11/19 9311    multivitamin 1 tablet, 1 tablet, Oral, Daily, Steve Diaz MD, 1 tablet at 07/11/19 0912    ondansetron (ZOFRAN) tablet 4 mg, 4 mg, Oral, Q8H PRN, Steve Diaz MD    Liraglutide (VICTOZA) SC injection 1.2 mg, 1.2 mg, Subcutaneous, Daily, Steve Diaz MD    insulin lispro (HUMALOG) injection vial 0-12 Units, 0-12 Units, Subcutaneous, TID , Anirudh Saez MD    insulin lispro (HUMALOG) injection vial 0-6 Units, 0-6 Units, Subcutaneous, Nightly, Anirudh Guerrero MD    glucose (GLUTOSE) 40 % oral gel 15 g, 15 g, Oral, PRN, Steve Diaz MD    dextrose 50 % IV solution, 12.5 g, Intravenous, PRN, Steve Diaz MD    glucagon (rDNA) injection 1 mg, 1 mg, Intramuscular, PRN, Steve Diaz MD    dextrose 5 % solution, 100 mL/hr, Intravenous, PRN, Steve Diaz MD    lisinopril (PRINIVIL;ZESTRIL) tablet 20 mg, 20 mg, Oral, Daily, 20 mg at 07/11/19 0912 **AND** hydrochlorothiazide (HYDRODIURIL) tablet 25 mg, 25 mg, Oral, Daily, Steve Diaz MD, 25 mg at 07/11/19 0912    DIET CARB CONTROL;    CT Head WO Contrast   Final Result   Mild atrophy and chronic microvascular ischemic disease. CT ABDOMEN PELVIS W IV CONTRAST Additional Contrast? None   Final Result   No central pulmonary embolism or aortic dissection. There is COPD and   coronary artery calcification. Likely liver disease with portal venous hypertension and persistent   hemangioma in the left hepatic lobe. A low-attenuation lesion is also   identified in the pancreatic head which is unchanged and surveillance   recommended. Redundant colon with constipation. Small amount of air is identified   in the periphery of ascending colon likely trapped air between the   fecal matter in the bowel. Pneumatosis is less likely. If colon needs   further assessment consider colonoscopy. CTA CHEST W CONTRAST   Final Result   No central pulmonary embolism or aortic dissection. There is COPD and   coronary artery calcification. Likely liver disease with portal venous hypertension and persistent   hemangioma in the left hepatic lobe. A low-attenuation lesion is also   identified in the pancreatic head which is unchanged and surveillance   recommended. Redundant colon with constipation. Small amount of air is identified   in the periphery of ascending colon likely trapped air between the   fecal matter in the bowel. Pneumatosis is less likely. If colon needs   further assessment consider colonoscopy. XR CHEST PORTABLE   Final Result   No acute process                   Assessment:    Active Problems:    Syncope and collapse    Altered mental status  Resolved Problems:    * No resolved hospital problems.  *  Polypharmacy  History of bipolar disorder    Plan:    Psych input noted   If no psych unit admission will discharge her with close outpatient follow-up with her PCP  She will also need outpatient psychiatric follow-up          Anirudh Puga  12:00 PM  7/11/2019    NOTE:

## 2019-07-11 NOTE — PROGRESS NOTES
Spoke with daughter Dianna Quiles, about non-formulary diabetic med Victoza. She will call her brother-in-law and see if he can bring the home med tomorrow. Pharmacy called at this time as well. Jennifer Bey, , called / voicemail left - [phone]  Dianna Quiles, daughter, [phone]    Per pharmacy - if family can't bring it tomorrow 7/11/19, let  know. Pt is on sliding scale insulin as well.

## 2019-07-11 NOTE — PROGRESS NOTES
Answered patients call light and asked if the patient needed anything, she responded \"Should I just jump out of the window now or should I go pee? \" When I asked her why she would jump out the window she responded \"well I have already had my water for the day so im just waiting around. \" Re-educated patient about what a danielson cathter is and why a patient would have one. Unable to re-orient patient at this time. Will continue to monitor.

## 2019-07-11 NOTE — CARE COORDINATION
Social work received call from Imani He with [employer]. Received voluntary form. Patient for [facility] accepting physician is Dr. Snow Brannon. Social work set up transport for 7:30 pm by [address] ambulance. [facility] must be called prior to leaving ([phone] option 1). Facility, RN, and charge RN made aware. SW called to update spouse, no answer and lvm.   Electronically signed by MELLO Horner on 7/11/2019 at 3:26 PM

## 2019-07-11 NOTE — PROGRESS NOTES
Nutrition Assessment    Type and Reason for Visit: Initial, Positive Nutrition Screen    Nutrition Recommendations: Continue current diet    Nutrition Assessment: patient meets criteria for severe malnutrition AEB noted poor PO intakes PTA, muscle/fat wasting, and 30% wt loss x ~2 yrs. At further risk for compromise d/t AMS w/ expected ongoing poor PO intakes and pt refusing ONS at this time. Will contineu diet and monitor. Malnutrition Assessment:  · Malnutrition Status: Meets the criteria for severe malnutrition  · Context: Chronic illness  · Findings of the 6 clinical characteristics of malnutrition (Minimum of 2 out of 6 clinical characteristics is required to make the diagnosis of moderate or severe Protein Calorie Malnutrition based on AND/ASPEN Guidelines):  1. Energy Intake-Less than or equal to 75% of estimated energy requirement, Greater than or equal to 3 months    2. Weight Loss-20% loss or greater, (>1 yr)  3. Fat Loss-Moderate subcutaneous fat loss, Orbital, Triceps  4. Muscle Loss-Severe muscle mass loss, Temples (temporalis muscle), Clavicles (pectoralis and deltoids)  5. Fluid Accumulation-No significant fluid accumulation,    6.  Strength-Not measured    Nutrition Risk Level:  Moderate    Nutrient Needs:  · Estimated Daily Total Kcal: 2107-5304(MSJ 1000 x 1.2 SF)  · Estimated Daily Protein (g): 75-85(1.3-1.5 gm/kg)  · Estimated Daily Total Fluid (ml/day): 5276-3036(1 ml/kcal)    Nutrition Diagnosis:   · Problem: Severe malnutrition, In context of chronic illness  · Etiology: related to Insufficient energy/nutrient consumption     Signs and symptoms:  as evidenced by Intake 25-50%, Diet history of poor intake, Moderate loss of subcutaneous fat, Severe muscle loss    Objective Information:  · Nutrition-Focused Physical Findings: AMS, abd WDL, no noted edema, fluids WDL, noted hallucinations  · Wound Type: None  · Current Nutrition Therapies:  · Oral Diet Orders: Carb Control 4 Carbs/Meal
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
## 2019-07-14 LAB
BLOOD CULTURE, ROUTINE: NORMAL
CULTURE, BLOOD 2: NORMAL
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
## 2021-06-14 ENCOUNTER — APPOINTMENT (OUTPATIENT)
Dept: CT IMAGING | Age: 76
End: 2021-06-14
Payer: MEDICARE

## 2021-06-14 ENCOUNTER — HOSPITAL ENCOUNTER (INPATIENT)
Age: 76
LOS: 4 days | Discharge: HOME HEALTH | DRG: 690 | End: 2021-06-18
Attending: INTERNAL MEDICINE | Admitting: INTERNAL MEDICINE
Payer: MEDICARE

## 2021-06-14 ENCOUNTER — HOSPITAL ENCOUNTER (EMERGENCY)
Age: 76
Discharge: ANOTHER HOSPITAL | End: 2021-06-14
Attending: EMERGENCY MEDICINE
Payer: MEDICARE

## 2021-06-14 VITALS
WEIGHT: 170 LBS | HEART RATE: 68 BPM | SYSTOLIC BLOOD PRESSURE: 138 MMHG | BODY MASS INDEX: 33.38 KG/M2 | RESPIRATION RATE: 18 BRPM | DIASTOLIC BLOOD PRESSURE: 62 MMHG | TEMPERATURE: 97.4 F | HEIGHT: 60 IN | OXYGEN SATURATION: 93 %

## 2021-06-14 DIAGNOSIS — S00.83XA CONTUSION OF FACE, INITIAL ENCOUNTER: ICD-10-CM

## 2021-06-14 DIAGNOSIS — R77.8 ELEVATED TROPONIN: ICD-10-CM

## 2021-06-14 DIAGNOSIS — S22.41XA CLOSED FRACTURE OF MULTIPLE RIBS OF RIGHT SIDE, INITIAL ENCOUNTER: ICD-10-CM

## 2021-06-14 DIAGNOSIS — S01.81XA FACIAL LACERATION, INITIAL ENCOUNTER: ICD-10-CM

## 2021-06-14 DIAGNOSIS — S20.219A CONTUSION OF CHEST WALL, UNSPECIFIED LATERALITY, INITIAL ENCOUNTER: ICD-10-CM

## 2021-06-14 DIAGNOSIS — S09.90XA CLOSED HEAD INJURY, INITIAL ENCOUNTER: ICD-10-CM

## 2021-06-14 DIAGNOSIS — S42.009A CLOSED NONDISPLACED FRACTURE OF CLAVICLE, UNSPECIFIED LATERALITY, UNSPECIFIED PART OF CLAVICLE, INITIAL ENCOUNTER: ICD-10-CM

## 2021-06-14 DIAGNOSIS — R55 SYNCOPE AND COLLAPSE: Primary | ICD-10-CM
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
## 2021-06-14 PROBLEM — N39.0 UTI (URINARY TRACT INFECTION): Status: ACTIVE | Noted: 2021-06-14

## 2021-06-14 LAB
ALBUMIN SERPL-MCNC: 4.6 G/DL (ref 3.5–5.2)
ALP BLD-CCNC: 80 U/L (ref 35–104)
ALT SERPL-CCNC: 14 U/L (ref 0–32)
ANION GAP SERPL CALCULATED.3IONS-SCNC: 14 MMOL/L (ref 7–16)
APTT: 28.3 SEC (ref 24.5–35.1)
AST SERPL-CCNC: 20 U/L (ref 0–31)
BACTERIA: ABNORMAL /HPF
BASOPHILS ABSOLUTE: 0.05 E9/L (ref 0–0.2)
BASOPHILS RELATIVE PERCENT: 0.4 % (ref 0–2)
BILIRUB SERPL-MCNC: 0.5 MG/DL (ref 0–1.2)
BILIRUBIN URINE: NEGATIVE
BLOOD, URINE: ABNORMAL
BUN BLDV-MCNC: 21 MG/DL (ref 6–23)
CALCIUM SERPL-MCNC: 10.4 MG/DL (ref 8.6–10.2)
CHLORIDE BLD-SCNC: 100 MMOL/L (ref 98–107)
CHP ED QC CHECK: NORMAL
CLARITY: ABNORMAL
CO2: 26 MMOL/L (ref 22–29)
COLOR: YELLOW
CREAT SERPL-MCNC: 1 MG/DL (ref 0.5–1)
EOSINOPHILS ABSOLUTE: 0.1 E9/L (ref 0.05–0.5)
EOSINOPHILS RELATIVE PERCENT: 0.8 % (ref 0–6)
EPITHELIAL CELLS, UA: ABNORMAL /HPF
GFR AFRICAN AMERICAN: >60
GFR NON-AFRICAN AMERICAN: 54 ML/MIN/1.73
GLUCOSE BLD-MCNC: 207 MG/DL
GLUCOSE BLD-MCNC: 223 MG/DL (ref 74–99)
GLUCOSE URINE: NEGATIVE MG/DL
HCT VFR BLD CALC: 43.9 % (ref 34–48)
HEMOGLOBIN: 14.7 G/DL (ref 11.5–15.5)
IMMATURE GRANULOCYTES #: 0.07 E9/L
IMMATURE GRANULOCYTES %: 0.6 % (ref 0–5)
INR BLD: 1
KETONES, URINE: NEGATIVE MG/DL
LEUKOCYTE ESTERASE, URINE: ABNORMAL
LYMPHOCYTES ABSOLUTE: 1.62 E9/L (ref 1.5–4)
LYMPHOCYTES RELATIVE PERCENT: 13.2 % (ref 20–42)
MCH RBC QN AUTO: 30.5 PG (ref 26–35)
MCHC RBC AUTO-ENTMCNC: 33.5 % (ref 32–34.5)
MCV RBC AUTO: 91.1 FL (ref 80–99.9)
METER GLUCOSE: 198 MG/DL (ref 74–99)
METER GLUCOSE: 207 MG/DL (ref 74–99)
MONOCYTES ABSOLUTE: 0.69 E9/L (ref 0.1–0.95)
MONOCYTES RELATIVE PERCENT: 5.6 % (ref 2–12)
NEUTROPHILS ABSOLUTE: 9.75 E9/L (ref 1.8–7.3)
NEUTROPHILS RELATIVE PERCENT: 79.4 % (ref 43–80)
NITRITE, URINE: NEGATIVE
PDW BLD-RTO: 14.2 FL (ref 11.5–15)
PH UA: 6 (ref 5–9)
PLATELET # BLD: 241 E9/L (ref 130–450)
PMV BLD AUTO: 9.5 FL (ref 7–12)
POTASSIUM REFLEX MAGNESIUM: 3.8 MMOL/L (ref 3.5–5)
PROTEIN UA: 30 MG/DL
PROTHROMBIN TIME: 11.1 SEC (ref 9.3–12.4)
RBC # BLD: 4.82 E12/L (ref 3.5–5.5)
RBC UA: ABNORMAL /HPF (ref 0–2)
SODIUM BLD-SCNC: 140 MMOL/L (ref 132–146)
SPECIFIC GRAVITY UA: >=1.03 (ref 1–1.03)
TOTAL CK: 177 U/L (ref 20–180)
TOTAL PROTEIN: 7.9 G/DL (ref 6.4–8.3)
TROPONIN, HIGH SENSITIVITY: 47 NG/L (ref 0–9)
TROPONIN, HIGH SENSITIVITY: 54 NG/L (ref 0–9)
TROPONIN, HIGH SENSITIVITY: 60 NG/L (ref 0–9)
UROBILINOGEN, URINE: 0.2 E.U./DL
WBC # BLD: 12.3 E9/L (ref 4.5–11.5)
WBC UA: >20 /HPF (ref 0–5)

## 2021-06-14 PROCEDURE — 6370000000 HC RX 637 (ALT 250 FOR IP): Performed by: INTERNAL MEDICINE

## 2021-06-14 PROCEDURE — 6360000002 HC RX W HCPCS: Performed by: NURSE PRACTITIONER

## 2021-06-14 PROCEDURE — 82962 GLUCOSE BLOOD TEST: CPT

## 2021-06-14 PROCEDURE — 36415 COLL VENOUS BLD VENIPUNCTURE: CPT

## 2021-06-14 PROCEDURE — 81001 URINALYSIS AUTO W/SCOPE: CPT

## 2021-06-14 PROCEDURE — 6370000000 HC RX 637 (ALT 250 FOR IP): Performed by: NURSE PRACTITIONER

## 2021-06-14 PROCEDURE — 2500000003 HC RX 250 WO HCPCS: Performed by: NURSE PRACTITIONER

## 2021-06-14 PROCEDURE — 12013 RPR F/E/E/N/L/M 2.6-5.0 CM: CPT

## 2021-06-14 PROCEDURE — 85610 PROTHROMBIN TIME: CPT

## 2021-06-14 PROCEDURE — 70486 CT MAXILLOFACIAL W/O DYE: CPT

## 2021-06-14 PROCEDURE — 70450 CT HEAD/BRAIN W/O DYE: CPT

## 2021-06-14 PROCEDURE — 2580000003 HC RX 258: Performed by: EMERGENCY MEDICINE

## 2021-06-14 PROCEDURE — 84484 ASSAY OF TROPONIN QUANT: CPT

## 2021-06-14 PROCEDURE — 93005 ELECTROCARDIOGRAM TRACING: CPT | Performed by: NURSE PRACTITIONER

## 2021-06-14 PROCEDURE — 80053 COMPREHEN METABOLIC PANEL: CPT

## 2021-06-14 PROCEDURE — 6370000000 HC RX 637 (ALT 250 FOR IP): Performed by: EMERGENCY MEDICINE

## 2021-06-14 PROCEDURE — 85025 COMPLETE CBC W/AUTO DIFF WBC: CPT

## 2021-06-14 PROCEDURE — 2060000000 HC ICU INTERMEDIATE R&B

## 2021-06-14 PROCEDURE — 87088 URINE BACTERIA CULTURE: CPT

## 2021-06-14 PROCEDURE — 82550 ASSAY OF CK (CPK): CPT

## 2021-06-14 PROCEDURE — 85730 THROMBOPLASTIN TIME PARTIAL: CPT

## 2021-06-14 PROCEDURE — 72125 CT NECK SPINE W/O DYE: CPT

## 2021-06-14 PROCEDURE — 90471 IMMUNIZATION ADMIN: CPT | Performed by: NURSE PRACTITIONER

## 2021-06-14 PROCEDURE — 90715 TDAP VACCINE 7 YRS/> IM: CPT | Performed by: NURSE PRACTITIONER

## 2021-06-14 PROCEDURE — 6360000002 HC RX W HCPCS: Performed by: EMERGENCY MEDICINE

## 2021-06-14 PROCEDURE — 99285 EMERGENCY DEPT VISIT HI MDM: CPT

## 2021-06-14 PROCEDURE — 71250 CT THORAX DX C-: CPT

## 2021-06-14 RX ORDER — DICYCLOMINE HYDROCHLORIDE 10 MG/1
10 CAPSULE ORAL
Status: DISCONTINUED | OUTPATIENT
Start: 2021-06-14 | End: 2021-06-18 | Stop reason: HOSPADM

## 2021-06-14 RX ORDER — LIDOCAINE HYDROCHLORIDE 10 MG/ML
5 INJECTION, SOLUTION INFILTRATION; PERINEURAL ONCE
Status: COMPLETED | OUTPATIENT
Start: 2021-06-14 | End: 2021-06-14

## 2021-06-14 RX ORDER — ARIPIPRAZOLE 5 MG/1
5 TABLET ORAL DAILY
Status: DISCONTINUED | OUTPATIENT
Start: 2021-06-15 | End: 2021-06-18 | Stop reason: HOSPADM

## 2021-06-14 RX ORDER — DEXTROSE MONOHYDRATE 25 G/50ML
12.5 INJECTION, SOLUTION INTRAVENOUS PRN
Status: DISCONTINUED | OUTPATIENT
Start: 2021-06-14 | End: 2021-06-18 | Stop reason: HOSPADM

## 2021-06-14 RX ORDER — HYDROCHLOROTHIAZIDE 25 MG/1
25 TABLET ORAL DAILY
Status: DISCONTINUED | OUTPATIENT
Start: 2021-06-15 | End: 2021-06-18 | Stop reason: HOSPADM

## 2021-06-14 RX ORDER — LISINOPRIL AND HYDROCHLOROTHIAZIDE 25; 20 MG/1; MG/1
1 TABLET ORAL DAILY
Status: DISCONTINUED | OUTPATIENT
Start: 2021-06-15 | End: 2021-06-14 | Stop reason: CLARIF

## 2021-06-14 RX ORDER — PANTOPRAZOLE SODIUM 40 MG/1
40 TABLET, DELAYED RELEASE ORAL
Status: DISCONTINUED | OUTPATIENT
Start: 2021-06-15 | End: 2021-06-18 | Stop reason: HOSPADM

## 2021-06-14 RX ORDER — ACETAMINOPHEN 500 MG
1000 TABLET ORAL ONCE
Status: COMPLETED | OUTPATIENT
Start: 2021-06-14 | End: 2021-06-14

## 2021-06-14 RX ORDER — NICOTINE POLACRILEX 4 MG
15 LOZENGE BUCCAL PRN
Status: DISCONTINUED | OUTPATIENT
Start: 2021-06-14 | End: 2021-06-18 | Stop reason: HOSPADM

## 2021-06-14 RX ORDER — METOPROLOL SUCCINATE 25 MG/1
25 TABLET, EXTENDED RELEASE ORAL DAILY
Status: DISCONTINUED | OUTPATIENT
Start: 2021-06-15 | End: 2021-06-18 | Stop reason: HOSPADM

## 2021-06-14 RX ORDER — LISINOPRIL 20 MG/1
20 TABLET ORAL DAILY
Status: DISCONTINUED | OUTPATIENT
Start: 2021-06-15 | End: 2021-06-18 | Stop reason: HOSPADM

## 2021-06-14 RX ORDER — HYDROCODONE BITARTRATE AND ACETAMINOPHEN 5; 325 MG/1; MG/1
1 TABLET ORAL EVERY 6 HOURS PRN
Status: DISCONTINUED | OUTPATIENT
Start: 2021-06-14 | End: 2021-06-18 | Stop reason: HOSPADM

## 2021-06-14 RX ORDER — ASPIRIN 325 MG
325 TABLET ORAL ONCE
Status: COMPLETED | OUTPATIENT
Start: 2021-06-14 | End: 2021-06-14

## 2021-06-14 RX ORDER — HYDROCODONE BITARTRATE AND ACETAMINOPHEN 5; 325 MG/1; MG/1
1 TABLET ORAL 3 TIMES DAILY
Status: ON HOLD | COMMUNITY
End: 2021-12-20 | Stop reason: HOSPADM

## 2021-06-14 RX ORDER — BUSPIRONE HYDROCHLORIDE 5 MG/1
7.5 TABLET ORAL 3 TIMES DAILY
Status: DISCONTINUED | OUTPATIENT
Start: 2021-06-14 | End: 2021-06-18 | Stop reason: HOSPADM

## 2021-06-14 RX ORDER — ASPIRIN 81 MG/1
81 TABLET ORAL DAILY
Status: DISCONTINUED | OUTPATIENT
Start: 2021-06-15 | End: 2021-06-18 | Stop reason: HOSPADM

## 2021-06-14 RX ORDER — ALOGLIPTIN 12.5 MG/1
12.5 TABLET, FILM COATED ORAL DAILY
Status: DISCONTINUED | OUTPATIENT
Start: 2021-06-15 | End: 2021-06-18

## 2021-06-14 RX ORDER — DEXTROSE MONOHYDRATE 50 MG/ML
100 INJECTION, SOLUTION INTRAVENOUS PRN
Status: DISCONTINUED | OUTPATIENT
Start: 2021-06-14 | End: 2021-06-18 | Stop reason: HOSPADM

## 2021-06-14 RX ADMIN — DICYCLOMINE HYDROCHLORIDE 10 MG: 10 CAPSULE ORAL at 22:23

## 2021-06-14 RX ADMIN — BUSPIRONE HYDROCHLORIDE 7.5 MG: 5 TABLET ORAL at 22:23

## 2021-06-14 RX ADMIN — ASPIRIN 325 MG: 325 TABLET ORAL at 17:25

## 2021-06-14 RX ADMIN — TETANUS TOXOID, REDUCED DIPHTHERIA TOXOID AND ACELLULAR PERTUSSIS VACCINE, ADSORBED 0.5 ML: 5; 2.5; 8; 8; 2.5 SUSPENSION INTRAMUSCULAR at 16:05

## 2021-06-14 RX ADMIN — CEFTRIAXONE 1000 MG: 1 INJECTION, POWDER, FOR SOLUTION INTRAMUSCULAR; INTRAVENOUS at 17:25

## 2021-06-14 RX ADMIN — LIDOCAINE HYDROCHLORIDE 5 ML: 10 INJECTION, SOLUTION INFILTRATION; PERINEURAL at 16:04

## 2021-06-14 RX ADMIN — HYDROCODONE BITARTRATE AND ACETAMINOPHEN 1 TABLET: 5; 325 TABLET ORAL at 22:23

## 2021-06-14 RX ADMIN — ACETAMINOPHEN 1000 MG: 500 TABLET ORAL at 18:15

## 2021-06-14 ASSESSMENT — PAIN DESCRIPTION - DESCRIPTORS
DESCRIPTORS: DISCOMFORT;ACHING
DESCRIPTORS: DULL

## 2021-06-14 ASSESSMENT — PAIN DESCRIPTION - PAIN TYPE: TYPE: ACUTE PAIN;CHRONIC PAIN

## 2021-06-14 ASSESSMENT — PAIN SCALES - GENERAL
PAINLEVEL_OUTOF10: 6
PAINLEVEL_OUTOF10: 8
PAINLEVEL_OUTOF10: 6
PAINLEVEL_OUTOF10: 8
PAINLEVEL_OUTOF10: 8

## 2021-06-14 ASSESSMENT — PAIN DESCRIPTION - FREQUENCY: FREQUENCY: CONTINUOUS

## 2021-06-14 ASSESSMENT — PAIN DESCRIPTION - LOCATION
LOCATION: BACK;FACE
LOCATION: FACE

## 2021-06-14 NOTE — ED NOTES
Pt states she has been falling more frequently-  Last fall approx 1 month ago     Link Parent, RN  06/14/21 1225

## 2021-06-14 NOTE — ED PROVIDER NOTES
a past medical history of Arthritis, Depression, Diabetes mellitus ([address] and Hypertension. Surgical History:  has a past surgical history that includes Cholecystectomy; Appendectomy; Leg Surgery; Toe Surgery; Foot surgery; Clavicle surgery; and Dental surgery. Social History:  reports that she has been smoking cigarettes. She has been smoking about 0.50 packs per day. She has never used smokeless tobacco. She reports that she does not drink alcohol and does not use drugs. Family History: family history is not on file. Allergies: Patient has no known allergies. Physical Exam   Oxygen Saturation Interpretation: Normal.        ED Triage Vitals   BP Temp Temp Source Pulse Resp SpO2 Height Weight   06/14/21 1357 06/14/21 1357 06/14/21 1357 06/14/21 1357 06/14/21 1357 06/14/21 1357 06/14/21 1408 06/14/21 1408   128/82 97.5 °F (36.4 °C) Temporal 84 16 93 % 5' (1.524 m) 170 lb (77.1 kg)         Constitutional:  Alert, development consistent with age. HEENT:  NC.  Airway patent. Patient with bruising to the bridge of the nose there is bruising to the chin, as well as a 3 cm linear laceration to the middle aspect of the chin bleeding is controlled there is no foreign body visualized there is no involvement of tendon or muscle. Neck:  Normal ROM. Supple. Respiratory:  Clear to auscultation and breath sounds equal.  CV:  Regular rate and rhythm, normal heart sounds, without pathological murmurs, ectopy, gallops, or rubs. GI:  Abdomen Soft, nontender, good bowel sounds. No firm or pulsatile mass. Back:  No costovertebral tenderness. Integument:  Normal turgor. Warm, dry, without visible rash, unless noted elsewhere. Lymphatics: No lymphangitis or adenopathy noted. Neurological:  Oriented. Motor functions intact.     Lab / Imaging Results   (All laboratory and radiology results have been personally reviewed by myself)  Labs:  Results for orders placed or performed during the hospital encounter of 06/14/21   CBC Auto Differential   Result Value Ref Range    WBC 12.3 (H) 4.5 - 11.5 E9/L    RBC 4.82 3.50 - 5.50 E12/L    Hemoglobin 14.7 11.5 - 15.5 g/dL    Hematocrit 43.9 34.0 - 48.0 %    MCV 91.1 80.0 - 99.9 fL    MCH 30.5 26.0 - 35.0 pg    MCHC 33.5 32.0 - 34.5 %    RDW 14.2 11.5 - 15.0 fL    Platelets 179 171 - 850 E9/L    MPV 9.5 7.0 - 12.0 fL    Neutrophils % 79.4 43.0 - 80.0 %    Immature Granulocytes % 0.6 0.0 - 5.0 %    Lymphocytes % 13.2 (L) 20.0 - 42.0 %    Monocytes % 5.6 2.0 - 12.0 %    Eosinophils % 0.8 0.0 - 6.0 %    Basophils % 0.4 0.0 - 2.0 %    Neutrophils Absolute 9.75 (H) 1.80 - 7.30 E9/L    Immature Granulocytes # 0.07 E9/L    Lymphocytes Absolute 1.62 1.50 - 4.00 E9/L    Monocytes Absolute 0.69 0.10 - 0.95 E9/L    Eosinophils Absolute 0.10 0.05 - 0.50 E9/L    Basophils Absolute 0.05 0.00 - 0.20 E9/L   Comprehensive Metabolic Panel w/ Reflex to MG   Result Value Ref Range    Sodium 140 132 - 146 mmol/L    Potassium reflex Magnesium 3.8 3.5 - 5.0 mmol/L    Chloride 100 98 - 107 mmol/L    CO2 26 22 - 29 mmol/L    Anion Gap 14 7 - 16 mmol/L    Glucose 223 (H) 74 - 99 mg/dL    BUN 21 6 - 23 mg/dL    CREATININE 1.0 0.5 - 1.0 mg/dL    GFR Non-African American 54 >=60 mL/min/1.73    GFR African American >60     Calcium 10.4 (H) 8.6 - 10.2 mg/dL    Total Protein 7.9 6.4 - 8.3 g/dL    Albumin 4.6 3.5 - 5.2 g/dL    Total Bilirubin 0.5 0.0 - 1.2 mg/dL    Alkaline Phosphatase 80 35 - 104 U/L    ALT 14 0 - 32 U/L    AST 20 0 - 31 U/L   CK   Result Value Ref Range    Total  20 - 180 U/L   Urinalysis, reflex to microscopic   Result Value Ref Range    Color, UA Yellow Straw/Yellow    Clarity, UA CLOUDY (A) Clear    Glucose, Ur Negative Negative mg/dL    Bilirubin Urine Negative Negative    Ketones, Urine Negative Negative mg/dL    Specific Gravity, UA >=1.030 1.005 - 1.030    Blood, Urine SMALL (A) Negative    pH, UA 6.0 5.0 - 9.0    Protein, UA 30 (A) Negative mg/dL    Urobilinogen, Urine 0.2 <2.0 E. U./dL    Nitrite, Urine Negative Negative    Leukocyte Esterase, Urine MODERATE (A) Negative   Troponin   Result Value Ref Range    Troponin, High Sensitivity 60 (H) 0 - 9 ng/L   Protime-INR   Result Value Ref Range    Protime 11.1 9.3 - 12.4 sec    INR 1.0    APTT   Result Value Ref Range    aPTT 28.3 24.5 - 35.1 sec   Microscopic Urinalysis   Result Value Ref Range    WBC, UA >20 (A) 0 - 5 /HPF    RBC, UA 5-10 (A) 0 - 2 /HPF    Epithelial Cells, UA MODERATE /HPF    Bacteria, UA MODERATE (A) None Seen /HPF   Troponin   Result Value Ref Range    Troponin, High Sensitivity 54 (H) 0 - 9 ng/L   POCT glucose   Result Value Ref Range    Glucose 207 mg/dL    QC OK? ok    POCT Glucose   Result Value Ref Range    Meter Glucose 207 (H) 74 - 99 mg/dL   EKG 12 Lead   Result Value Ref Range    Ventricular Rate 63 BPM    Atrial Rate 63 BPM    P-R Interval 180 ms    QRS Duration 86 ms    Q-T Interval 450 ms    QTc Calculation (Bazett) 460 ms    P Axis 68 degrees    R Axis 22 degrees    T Axis 36 degrees     Imaging: All Radiology results interpreted by Radiologist unless otherwise noted. CT HEAD WO CONTRAST   Final Result   1. There is no acute intracranial abnormality. Specifically, there is no   intracranial hemorrhage. 2. Atrophy and periventricular leukomalacia,         CT CERVICAL SPINE WO CONTRAST   Final Result   1. No fracture or joint dislocation is seen. 2. Degenerative changes, as described. CT FACIAL BONES WO CONTRAST   Final Result   No acute traumatic injury of the facial bones. CT CHEST WO CONTRAST   Final Result   1. No acute process in the chest.      2.  Multiple chronic right-sided rib fractures. 3.  Chronic right clavicle fracture. EKG #1:  Interpreted by emergency department physician unless otherwise noted. Time:  1523    Rate: 63 bpm  Rhythm: Sinus rhythm. Interpretation: Normal EKG, normal sinus rhythm.   Comparison: Today's ECG is unchanged from previous tracings. ED Course / Medical Decision Making     Medications   lidocaine 1 % injection 5 mL (5 mLs Intradermal Given 6/14/21 1604)   Tetanus-Diphth-Acell Pertussis (BOOSTRIX) injection 0.5 mL (0.5 mLs Intramuscular Given 6/14/21 1605)   cefTRIAXone (ROCEPHIN) 1,000 mg in sterile water 10 mL IV syringe (1,000 mg Intravenous New Bag 6/14/21 1725)   aspirin tablet 325 mg (325 mg Oral Given 6/14/21 1725)   acetaminophen (TYLENOL) tablet 1,000 mg (1,000 mg Oral Given 6/14/21 1815)        Re-Evaluations:  6/14/21      Time: 1800  Patients condition is improving. Consultations:             [id number] Discussed case. They will admit this patient. Procedures:     LACERATION REPAIR  PROCEDURE NOTE:  Unless otherwise indicated, this procedure was done or directly supervised by the ED attending. Laceration #: 1. Location: chin  Length: 3cm. The wound area was irrigated with sterile saline, cleansed with povidone iodine and draped in a sterile fashion. The wound area was anesthetized with not required. WOUND COMPLEXITY:    Debridement: None. Undermining: None. Wound Margins Revised: None. Flaps Aligned: yes. The wound was explored with the following results no foreign body or tendon injury seen. The wound was closed with dermabond  Dressing:  a sterile dressing was placed. MDM:Patient did have CT scans and lab work. Patient has an elevated troponin she does have a history of open heart surgery which patient states that was many many years ago and she has not followed up with cardiology in several years. Patient was treated with IV fluids Tylenol for pain. All questions were answered of the patient and patient's . Patient states that she has had multiple episodes of falling in the last several hours her falls have become more frequent.   Patient also states that she is unsure as to why she is swelling as she just wakes up on the floor. Patient is agreeable for inpatient admission at this time. patient was seen and evaluated by dr. Idania Macias of Care/Counseling:  Myself: YVONNE Schultz CNP and Emergency Attending Physician reviewed today's visit with the patient and spouse / life partner in addition to providing specific details for the plan of care and counseling regarding the diagnosis and prognosis. Questions are answered at this time and are agreeable with the plan. Assessment      1. Syncope and collapse    2. Closed head injury, initial encounter    3. Fall, initial encounter    4. Contusion of face, initial encounter    5. Contusion of chest wall, unspecified laterality, initial encounter    6. Elevated troponin    7. Facial laceration, initial encounter    8. Closed fracture of multiple ribs of right side, initial encounter    9. Closed nondisplaced fracture of clavicle, unspecified laterality, unspecified part of clavicle, initial encounter      This patient's ED course included: a personal history and physicial examination  This patient has remained hemodynamically stable during their ED course. Plan   Admission to Telemetry Unit. Patient condition is stable. New Medications     New Prescriptions    No medications on file     Electronically signed by YVONNE Schultz CNP   DD: 6/14/21  **This report was transcribed using voice recognition software. Every effort was made to ensure accuracy; however, inadvertent computerized transcription errors may be present.   END OF PROVIDER NOTE     YVONNE Nance CNP  06/14/21 5222

## 2021-06-15 LAB
ANION GAP SERPL CALCULATED.3IONS-SCNC: 13 MMOL/L (ref 7–16)
BUN BLDV-MCNC: 19 MG/DL (ref 6–23)
CALCIUM SERPL-MCNC: 9.8 MG/DL (ref 8.6–10.2)
CHLORIDE BLD-SCNC: 101 MMOL/L (ref 98–107)
CO2: 24 MMOL/L (ref 22–29)
CREAT SERPL-MCNC: 1 MG/DL (ref 0.5–1)
GFR AFRICAN AMERICAN: >60
GFR NON-AFRICAN AMERICAN: 54 ML/MIN/1.73
GLUCOSE BLD-MCNC: 233 MG/DL (ref 74–99)
LV EF: 60 %
LVEF MODALITY: NORMAL
METER GLUCOSE: 109 MG/DL (ref 74–99)
METER GLUCOSE: 175 MG/DL (ref 74–99)
METER GLUCOSE: 186 MG/DL (ref 74–99)
METER GLUCOSE: 240 MG/DL (ref 74–99)
POTASSIUM SERPL-SCNC: 3.5 MMOL/L (ref 3.5–5)
PROCALCITONIN: 0.06 NG/ML (ref 0–0.08)
SODIUM BLD-SCNC: 138 MMOL/L (ref 132–146)
TROPONIN, HIGH SENSITIVITY: 39 NG/L (ref 0–9)
TSH SERPL DL<=0.05 MIU/L-ACNC: 1.48 UIU/ML (ref 0.27–4.2)

## 2021-06-15 PROCEDURE — 80048 BASIC METABOLIC PNL TOTAL CA: CPT

## 2021-06-15 PROCEDURE — 99223 1ST HOSP IP/OBS HIGH 75: CPT | Performed by: INTERNAL MEDICINE

## 2021-06-15 PROCEDURE — 84484 ASSAY OF TROPONIN QUANT: CPT

## 2021-06-15 PROCEDURE — 6370000000 HC RX 637 (ALT 250 FOR IP): Performed by: INTERNAL MEDICINE

## 2021-06-15 PROCEDURE — 82962 GLUCOSE BLOOD TEST: CPT

## 2021-06-15 PROCEDURE — APPSS60 APP SPLIT SHARED TIME 46-60 MINUTES: Performed by: NURSE PRACTITIONER

## 2021-06-15 PROCEDURE — 97161 PT EVAL LOW COMPLEX 20 MIN: CPT

## 2021-06-15 PROCEDURE — 36415 COLL VENOUS BLD VENIPUNCTURE: CPT

## 2021-06-15 PROCEDURE — 97165 OT EVAL LOW COMPLEX 30 MIN: CPT

## 2021-06-15 PROCEDURE — 2060000000 HC ICU INTERMEDIATE R&B

## 2021-06-15 PROCEDURE — 84443 ASSAY THYROID STIM HORMONE: CPT

## 2021-06-15 PROCEDURE — 93306 TTE W/DOPPLER COMPLETE: CPT

## 2021-06-15 PROCEDURE — 84145 PROCALCITONIN (PCT): CPT

## 2021-06-15 RX ORDER — RANITIDINE 300 MG/1
300 TABLET ORAL NIGHTLY
Status: ON HOLD | COMMUNITY
End: 2021-12-20 | Stop reason: HOSPADM

## 2021-06-15 RX ORDER — PREGABALIN 50 MG/1
100 CAPSULE ORAL 4 TIMES DAILY
Status: DISCONTINUED | OUTPATIENT
Start: 2021-06-15 | End: 2021-06-18 | Stop reason: HOSPADM

## 2021-06-15 RX ORDER — HYDROXYZINE PAMOATE 25 MG/1
25 CAPSULE ORAL 3 TIMES DAILY PRN
Status: ON HOLD | COMMUNITY
End: 2021-06-18 | Stop reason: HOSPADM

## 2021-06-15 RX ORDER — LEVOTHYROXINE SODIUM 0.07 MG/1
75 TABLET ORAL DAILY
COMMUNITY

## 2021-06-15 RX ORDER — LORAZEPAM 0.5 MG/1
0.5 TABLET ORAL 2 TIMES DAILY
Status: DISCONTINUED | OUTPATIENT
Start: 2021-06-15 | End: 2021-06-18 | Stop reason: HOSPADM

## 2021-06-15 RX ORDER — LEVOTHYROXINE SODIUM 0.07 MG/1
75 TABLET ORAL DAILY
Status: DISCONTINUED | OUTPATIENT
Start: 2021-06-15 | End: 2021-06-18 | Stop reason: HOSPADM

## 2021-06-15 RX ORDER — PANTOPRAZOLE SODIUM 40 MG/1
40 TABLET, DELAYED RELEASE ORAL DAILY
Status: ON HOLD | COMMUNITY
End: 2021-06-18 | Stop reason: HOSPADM

## 2021-06-15 RX ORDER — VILAZODONE HYDROCHLORIDE 40 MG/1
40 TABLET ORAL DAILY
Status: ON HOLD | COMMUNITY
End: 2021-12-20 | Stop reason: HOSPADM

## 2021-06-15 RX ORDER — ATORVASTATIN CALCIUM 20 MG/1
20 TABLET, FILM COATED ORAL DAILY
COMMUNITY

## 2021-06-15 RX ORDER — LORATADINE 10 MG/1
10 TABLET ORAL DAILY
Status: ON HOLD | COMMUNITY
End: 2021-12-14

## 2021-06-15 RX ORDER — ANTIOX #8/OM3/DHA/EPA/LUT/ZEAX 250-2.5 MG
1 CAPSULE ORAL 2 TIMES DAILY
COMMUNITY

## 2021-06-15 RX ORDER — LORAZEPAM 0.5 MG/1
0.5 TABLET ORAL 2 TIMES DAILY
Status: ON HOLD | COMMUNITY
End: 2021-12-20 | Stop reason: HOSPADM

## 2021-06-15 RX ORDER — PANTOPRAZOLE SODIUM 40 MG/1
40 TABLET, DELAYED RELEASE ORAL DAILY
Status: DISCONTINUED | OUTPATIENT
Start: 2021-06-15 | End: 2021-06-15 | Stop reason: SDUPTHER

## 2021-06-15 RX ORDER — PREGABALIN 100 MG/1
100 CAPSULE ORAL 4 TIMES DAILY
Status: ON HOLD | COMMUNITY
End: 2021-12-20 | Stop reason: HOSPADM

## 2021-06-15 RX ORDER — ATORVASTATIN CALCIUM 20 MG/1
20 TABLET, FILM COATED ORAL DAILY
Status: DISCONTINUED | OUTPATIENT
Start: 2021-06-15 | End: 2021-06-18 | Stop reason: HOSPADM

## 2021-06-15 RX ORDER — VILAZODONE HYDROCHLORIDE 40 MG/1
40 TABLET ORAL DAILY
Status: DISCONTINUED | OUTPATIENT
Start: 2021-06-15 | End: 2021-06-18 | Stop reason: HOSPADM

## 2021-06-15 RX ORDER — BACLOFEN 20 MG/1
20 TABLET ORAL NIGHTLY
Status: ON HOLD | COMMUNITY
End: 2021-12-20 | Stop reason: HOSPADM

## 2021-06-15 RX ADMIN — METFORMIN HYDROCHLORIDE 500 MG: 500 TABLET ORAL at 16:43

## 2021-06-15 RX ADMIN — ASPIRIN 81 MG: 81 TABLET, COATED ORAL at 08:37

## 2021-06-15 RX ADMIN — INSULIN LISPRO 1 UNITS: 100 INJECTION, SOLUTION INTRAVENOUS; SUBCUTANEOUS at 21:55

## 2021-06-15 RX ADMIN — PREGABALIN 100 MG: 50 CAPSULE ORAL at 20:11

## 2021-06-15 RX ADMIN — PREGABALIN 100 MG: 50 CAPSULE ORAL at 16:43

## 2021-06-15 RX ADMIN — ARIPIPRAZOLE 5 MG: 5 TABLET ORAL at 08:38

## 2021-06-15 RX ADMIN — HYDROCODONE BITARTRATE AND ACETAMINOPHEN 1 TABLET: 5; 325 TABLET ORAL at 16:47

## 2021-06-15 RX ADMIN — ATORVASTATIN CALCIUM 20 MG: 20 TABLET, FILM COATED ORAL at 14:22

## 2021-06-15 RX ADMIN — LEVOTHYROXINE SODIUM 75 MCG: 75 TABLET ORAL at 14:22

## 2021-06-15 RX ADMIN — BUSPIRONE HYDROCHLORIDE 7.5 MG: 5 TABLET ORAL at 14:22

## 2021-06-15 RX ADMIN — PANCRELIPASE 36000 UNITS: 60000; 12000; 38000 CAPSULE, DELAYED RELEASE PELLETS ORAL at 11:14

## 2021-06-15 RX ADMIN — DICYCLOMINE HYDROCHLORIDE 10 MG: 10 CAPSULE ORAL at 16:43

## 2021-06-15 RX ADMIN — DICYCLOMINE HYDROCHLORIDE 10 MG: 10 CAPSULE ORAL at 20:11

## 2021-06-15 RX ADMIN — LISINOPRIL 20 MG: 20 TABLET ORAL at 08:38

## 2021-06-15 RX ADMIN — LORAZEPAM 0.5 MG: 0.5 TABLET ORAL at 20:11

## 2021-06-15 RX ADMIN — INSULIN LISPRO 4 UNITS: 100 INJECTION, SOLUTION INTRAVENOUS; SUBCUTANEOUS at 11:15

## 2021-06-15 RX ADMIN — BUSPIRONE HYDROCHLORIDE 7.5 MG: 5 TABLET ORAL at 08:37

## 2021-06-15 RX ADMIN — DICYCLOMINE HYDROCHLORIDE 10 MG: 10 CAPSULE ORAL at 06:03

## 2021-06-15 RX ADMIN — METOPROLOL SUCCINATE 25 MG: 25 TABLET, EXTENDED RELEASE ORAL at 08:38

## 2021-06-15 RX ADMIN — PANCRELIPASE 36000 UNITS: 60000; 12000; 38000 CAPSULE, DELAYED RELEASE PELLETS ORAL at 16:43

## 2021-06-15 RX ADMIN — HYDROCHLOROTHIAZIDE 25 MG: 25 TABLET ORAL at 08:38

## 2021-06-15 RX ADMIN — HYDROCODONE BITARTRATE AND ACETAMINOPHEN 1 TABLET: 5; 325 TABLET ORAL at 10:42

## 2021-06-15 RX ADMIN — ALOGLIPTIN 12.5 MG: 12.5 TABLET, FILM COATED ORAL at 08:38

## 2021-06-15 RX ADMIN — BUSPIRONE HYDROCHLORIDE 7.5 MG: 5 TABLET ORAL at 20:11

## 2021-06-15 RX ADMIN — LORAZEPAM 0.5 MG: 0.5 TABLET ORAL at 12:08

## 2021-06-15 RX ADMIN — PANCRELIPASE 36000 UNITS: 60000; 12000; 38000 CAPSULE, DELAYED RELEASE PELLETS ORAL at 08:37

## 2021-06-15 RX ADMIN — HYDROCODONE BITARTRATE AND ACETAMINOPHEN 1 TABLET: 5; 325 TABLET ORAL at 05:08

## 2021-06-15 RX ADMIN — VILAZODONE HYDROCHLORIDE 40 MG: 40 TABLET ORAL at 14:21

## 2021-06-15 RX ADMIN — PANTOPRAZOLE SODIUM 40 MG: 40 TABLET, DELAYED RELEASE ORAL at 06:03

## 2021-06-15 RX ADMIN — DICYCLOMINE HYDROCHLORIDE 10 MG: 10 CAPSULE ORAL at 10:42

## 2021-06-15 ASSESSMENT — PAIN DESCRIPTION - LOCATION
LOCATION: BACK;FACE
LOCATION: BACK;FACE

## 2021-06-15 ASSESSMENT — PAIN DESCRIPTION - DESCRIPTORS
DESCRIPTORS: DISCOMFORT;ACHING
DESCRIPTORS: DISCOMFORT;ACHING

## 2021-06-15 ASSESSMENT — PAIN SCALES - GENERAL
PAINLEVEL_OUTOF10: 6
PAINLEVEL_OUTOF10: 0
PAINLEVEL_OUTOF10: 4
PAINLEVEL_OUTOF10: 3
PAINLEVEL_OUTOF10: 7
PAINLEVEL_OUTOF10: 7

## 2021-06-15 ASSESSMENT — PAIN DESCRIPTION - PAIN TYPE
TYPE: ACUTE PAIN
TYPE: ACUTE PAIN;CHRONIC PAIN

## 2021-06-15 NOTE — PROGRESS NOTES
Physical Therapy    Facility/Department: [facility]  Initial Assessment    NAME: Silvina Marcelino  : 1945  MRN: [id number]       REQUIRES PT FOLLOW UP: Yes       Patient Diagnosis(es): There were no encounter diagnoses. has a past medical history of Arthritis, Depression, Diabetes mellitus ([address] and Hypertension. has a past surgical history that includes Cholecystectomy; Appendectomy; Leg Surgery; Toe Surgery; Foot surgery; Clavicle surgery; and Dental surgery. Evaluating Therapist: Victorino Burns, PT     Equipment needs TBD     Referring Provider:  Dr. Ortiz Covington     PT order :  PT eval and treat     Room #:  1   DIAGNOSIS:  UTI, fall at home sustained facial lacerations, and CHI   PRECAUTIONS: falls     Social:  Pt lives with  Spouse  in a  1 floor plan set up, 2  steps and  1  rails to enter. Prior to admission pt walked with  No AD. Has cane      Initial Evaluation  Date:  6/15/2021  Treatment      Short Term/ Long Term   Goals   Was pt agreeable to Eval/treatment? yes      Does pt have pain?  facial, back, and B knee pain      Bed Mobility  Rolling: NT   Supine to sit:  SBA   Sit to supine:  NT   Scooting:  SBA in sit    S/I    Transfers Sit to stand: SBA to  Min assist   Stand to sit: min assist   Stand pivot:  NT    S/I    Ambulation     15, 70   feet with  ww  with SBA. 20 feet x 1 with no AD CGA   150  feet with  AAD  with  S/I        Stair negotiation: ascended and descended NT    2  steps with  No  rail with  CGA    LE ROM  WFL      LE strength  WFL      AM- PAC RAW score  17/ 24            Pt is alert and Oriented x 3      Balance:  CGA   Endurance:  Decreased   Bed/Chair alarm: yes      ASSESSMENT  Pt displays functional ability as noted in the objective portion of this evaluation.         Conditions Requiring Skilled Therapeutic Intervention:    [x]Decreased strength     []Decreased ROM  [x]Decreased functional mobility  [x]Decreased balance   [x]Decreased endurance   [x]Decreased posture  []Decreased sensation  []Decreased coordination   []Decreased vision  [x]Decreased safety awareness   [x]Increased pain         Treatment/Education:    Mobility as above. Pt reports fear of falling with initial stand, required min assist sit to stand from bed. SBA sit to stand from bedside chair. Use of ww with gait initially due to fear of falling. Pt required cues for proper ww use. No AD used for short distance gait at end of eval, CGA. Pt mildly unsteady , at risk for falls. Pt educated on fall risk,  Safe and proper technique with all mobility        Patient response to education:   Pt verbalized understanding Pt demonstrated skill Pt requires further education in this area    x  with cues   x       Comments:  Pt left in chair after session, with call light in reach. Rehab potential is Good for reaching above PT goals. Pts/ family goals   1. Decrease pain    Patient and or family understand(s) diagnosis, prognosis, and plan of care. - yes     PLAN  PT care will be provided in accordance with the objectives noted above. Whenever appropriate, clear delegation orders will be provided for nursing staff. Exercises and functional mobility practice will be used as well as appropriate assistive devices or modalities to obtain goals. Patient and family education will also be administered as needed.         PLAN OF CARE:    Current Treatment Recommendations     [x] Strengthening to improve independence with functional mobility   [] ROM to improve independence with functional mobility   [x] Balance Training to improve static/dynamic balance and to reduce fall risk  [x] Endurance Training to improve activity tolerance during functional mobility   [x] Transfer Training to improve safety and independence with all functional transfers   [x] Gait Training to improve gait mechanics, endurance and assess need for appropriate assistive device  [x] Stair Training in preparation for safe discharge home and/or into the community   [] Positioning to prevent skin breakdown and contractures  [x] Safety and Education Training   [x] Patient/Caregiver Education   [] HEP  [] Other     Frequency of treatments will be 2-5x/week x 7-10days. Time in: 0845   Time out: 0905       Evaluation Time includes thorough review of current medical information, gathering information on past medical history/social history and prior level of function, completion of standardized testing/informal observation of tasks, assessment of data and education on plan of care and goals.     CPT codes:  [x] Low Complexity PT evaluation 49647  [] Moderate Complexity PT evaluation 85645  [] High Complexity PT evaluation 40434  [] PT Re-evaluation 83184  [] Gait training 87894 minutes  [] Therapeutic activities 45472  minutes  [] Therapeutic exercises 76251  minutes  [] Neuromuscular reeducation 38597  minutes       [address] number:  PT 2623

## 2021-06-15 NOTE — CARE COORDINATION
Social work / Discharge planning:    Social work met with pt for initial assessment. Pt lives with  in a two-story home. 3 steps to enter with railing. Pt reports she does not go upstairs. Bathroom and bed located on first floor. Prior to admission pt ambulated independently and had no need for oxygen. Pt has never had home care and no past stays at a SNF. Pt states she is diabetic and has insulin supplies in home. Community PCP is Dr. Wai Schneider. SW will follow to assist with any discharge needs identified.     Electronically signed by MELLO Whyte on 6/15/21 at 12:19 PM EDT

## 2021-06-15 NOTE — H&P
[facility]  History and Physical      CHIEF COMPLAINT: Recurrent falls    Reason for Admission: Possible UTI, recurrent falls, elevated troponin    History Obtained From: Patient    PCP :  Uyen Prakash MD    [address] / [facility] [address]      HISTORY OF PRESENT ILLNESS:      The patient is a 70 y.o. female initially presented to [facility] to the emergency room with a chief complaint of multiple falls. Patient apparently also had syncope. Patient reports she has been following for a couple of months now. She denies any urinary symptoms including dysuria, frequency of urination. She does give me a history of Botox injection for urinary incontinence in March of this year. She reports she is following because of lower extremity weakness intermittently. She does have a chronic low back pain history. She apparently had epidural injections by Dr. Rai Romo. No interventions on the back. In the ED patient was noted to have multiple bruises on the face. She also was noted to have rib fractures which were felt to be old. There was questionable urinary tract infection. Troponin was elevated. Patient was then recommended for evaluation and treatment. At the time of questioning patient does have diffuse aches and pains. Past Medical History:        Diagnosis Date    Arthritis     Depression     Diabetes mellitus ([address]     Hypertension      Past Surgical History:        Procedure Laterality Date    APPENDECTOMY      CHOLECYSTECTOMY      CLAVICLE SURGERY      DENTAL SURGERY      FOOT SURGERY      LEG SURGERY      right    TOE SURGERY           Medications Prior to Admission:    Medications Prior to Admission: HYDROcodone-acetaminophen ([address]) 5-325 MG per tablet, Take 1 tablet by mouth every 6 hours as needed for Pain.   lipase-protease-amylase (CREON) 76357 units delayed release capsule, Take 36,000 Units by mouth 3 times daily (with meals)   metoprolol succinate (TOPROL XL) 25 MG extended release tablet, Take 25 mg by mouth daily  aspirin 81 MG tablet, Take 81 mg by mouth daily  busPIRone (BUSPAR) 15 MG tablet, Take 7.5 mg by mouth 3 times daily  ARIPiprazole (ABILIFY) 5 MG tablet, Take 5 mg by mouth daily  alendronate (FOSAMAX) 70 MG tablet, Take 70 mg by mouth every 7 days   omeprazole (PRILOSEC) 20 MG capsule, Take 40 mg by mouth Daily   lisinopril-hydrochlorothiazide (PRINZIDE;ZESTORETIC) 20-25 MG per tablet, Take 1 tablet by mouth daily. dicyclomine (BENTYL) 10 MG capsule, Take 10 mg by mouth 4 times daily (before meals and nightly)    Allergies:  Patient has no known allergies. Social History:   Social History     Socioeconomic History    Marital status:      Spouse name: Not on file    Number of children: Not on file    Years of education: Not on file    Highest education level: Not on file   Occupational History    Not on file   Tobacco Use    Smoking status: Current Some Day Smoker     Packs/day: 0.50     Types: Cigarettes    Smokeless tobacco: Never Used   Substance and Sexual Activity    Alcohol use: No     Alcohol/week: 2.0 standard drinks     Types: 2 Cans of beer per week    Drug use: No    Sexual activity: Not on file   Other Topics Concern    Not on file   Social History Narrative    Not on file     Social Determinants of Health     Financial Resource Strain:     Difficulty of Paying Living Expenses:    Food Insecurity:     Worried About Running Out of Food in the Last Year:     [address] in the Last Year:    Transportation Needs:     Lack of Transportation (Medical):      Lack of Transportation (Non-Medical):    Physical Activity:     Days of Exercise per Week:     Minutes of Exercise per Session:    Stress:     Feeling of Stress :    Social Connections:     Frequency of Communication with Friends and Family:     Frequency of Social Gatherings with Friends and Family:     Attends [employer] Services:     Active Member of [employer] or Organizations:     Attends Club or Organization Meetings:     Marital Status:    Intimate Partner Violence:     Fear of Current or Ex-Partner:     Emotionally Abused:     Physically Abused:     Sexually Abused:          Family History:   No family history on file. REVIEW OF SYSTEMS:    General ROS: negative  Hematological and Lymphatic ROS: negative  Endocrine ROS: negative  Respiratory ROS: no cough,  wheezing  or shortness of breath,   Cardiovascular ROS: no chest pain or dyspnea on exertion  Gastrointestinal ROS: no abdominal pain, change in bowel habits, or black or bloody stools  Genito-Urinary ROS: no dysuria, trouble voiding, or hematuria  Neurological ROS: no TIA or stroke symptoms  negative    Vitals:  BP (!) 142/64   Pulse 74   Temp 98.2 °F (36.8 °C) (Oral)   Resp 18   Ht 5' (1.524 m)   Wt 170 lb (77.1 kg)   SpO2 93%   BMI 33.20 kg/m²     PHYSICAL EXAM:  General:  Awake, alert, oriented X 3. Well developed, well nourished, well groomed. No apparent distress. HEENT:  Normocephalic, atraumatic. Pupils equal, round, reactive to light. No scleral icterus. No conjunctival injection. Neck:  Supple, no carotid bruits  Heart:  RRR,   Lungs:  CTA bilaterally, bilat symmetrical expansion, no wheeze, rales, or rhonchi  Abdomen:   Bowel sounds present, soft, nontender, no masses, no organomegaly, no peritoneal signs  Extremities:  No clubbing, cyanosis, or edema  Skin:  Warm and dry, no open lesions or rash  Neuro:  Cranial nerves 2-12 intact, no focal deficits      DATA:     Recent Results (from the past 24 hour(s))   POCT Glucose    Collection Time: 06/14/21  3:20 PM   Result Value Ref Range    Meter Glucose 207 (H) 74 - 99 mg/dL   EKG 12 Lead    Collection Time: 06/14/21  3:23 PM   Result Value Ref Range    Ventricular Rate 63 BPM    Atrial Rate 63 BPM    P-R Interval 180 ms    QRS Duration 86 ms    Q-T Interval 450 ms    QTc Calculation (Bazett) 460 ms    P Axis 68 degrees    R Axis 22 degrees    T Axis 36 degrees   POCT glucose    Collection Time: 06/14/21  3:45 PM   Result Value Ref Range    Glucose 207 mg/dL    QC OK? ok    CBC Auto Differential    Collection Time: 06/14/21  3:50 PM   Result Value Ref Range    WBC 12.3 (H) 4.5 - 11.5 E9/L    RBC 4.82 3.50 - 5.50 E12/L    Hemoglobin 14.7 11.5 - 15.5 g/dL    Hematocrit 43.9 34.0 - 48.0 %    MCV 91.1 80.0 - 99.9 fL    MCH 30.5 26.0 - 35.0 pg    MCHC 33.5 32.0 - 34.5 %    RDW 14.2 11.5 - 15.0 fL    Platelets 600 406 - 530 E9/L    MPV 9.5 7.0 - 12.0 fL    Neutrophils % 79.4 43.0 - 80.0 %    Immature Granulocytes % 0.6 0.0 - 5.0 %    Lymphocytes % 13.2 (L) 20.0 - 42.0 %    Monocytes % 5.6 2.0 - 12.0 %    Eosinophils % 0.8 0.0 - 6.0 %    Basophils % 0.4 0.0 - 2.0 %    Neutrophils Absolute 9.75 (H) 1.80 - 7.30 E9/L    Immature Granulocytes # 0.07 E9/L    Lymphocytes Absolute 1.62 1.50 - 4.00 E9/L    Monocytes Absolute 0.69 0.10 - 0.95 E9/L    Eosinophils Absolute 0.10 0.05 - 0.50 E9/L    Basophils Absolute 0.05 0.00 - 0.20 E9/L   Comprehensive Metabolic Panel w/ Reflex to MG    Collection Time: 06/14/21  3:50 PM   Result Value Ref Range    Sodium 140 132 - 146 mmol/L    Potassium reflex Magnesium 3.8 3.5 - 5.0 mmol/L    Chloride 100 98 - 107 mmol/L    CO2 26 22 - 29 mmol/L    Anion Gap 14 7 - 16 mmol/L    Glucose 223 (H) 74 - 99 mg/dL    BUN 21 6 - 23 mg/dL    CREATININE 1.0 0.5 - 1.0 mg/dL    GFR Non-African American 54 >=60 mL/min/1.73    GFR African American >60     Calcium 10.4 (H) 8.6 - 10.2 mg/dL    Total Protein 7.9 6.4 - 8.3 g/dL    Albumin 4.6 3.5 - 5.2 g/dL    Total Bilirubin 0.5 0.0 - 1.2 mg/dL    Alkaline Phosphatase 80 35 - 104 U/L    ALT 14 0 - 32 U/L    AST 20 0 - 31 U/L   CK    Collection Time: 06/14/21  3:50 PM   Result Value Ref Range    Total  20 - 180 U/L   Urinalysis, reflex to microscopic    Collection Time: 06/14/21  3:50 PM   Result Value Ref Range    Color, UA Yellow Straw/Yellow    Clarity, UA CLOUDY (A) Clear Glucose, Ur Negative Negative mg/dL    Bilirubin Urine Negative Negative    Ketones, Urine Negative Negative mg/dL    Specific Gravity, UA >=1.030 1.005 - 1.030    Blood, Urine SMALL (A) Negative    pH, UA 6.0 5.0 - 9.0    Protein, UA 30 (A) Negative mg/dL    Urobilinogen, Urine 0.2 <2.0 E.U./dL    Nitrite, Urine Negative Negative    Leukocyte Esterase, Urine MODERATE (A) Negative   Troponin    Collection Time: 06/14/21  3:50 PM   Result Value Ref Range    Troponin, High Sensitivity 60 (H) 0 - 9 ng/L   Protime-INR    Collection Time: 06/14/21  3:50 PM   Result Value Ref Range    Protime 11.1 9.3 - 12.4 sec    INR 1.0    APTT    Collection Time: 06/14/21  3:50 PM   Result Value Ref Range    aPTT 28.3 24.5 - 35.1 sec   Microscopic Urinalysis    Collection Time: 06/14/21  3:50 PM   Result Value Ref Range    WBC, UA >20 (A) 0 - 5 /HPF    RBC, UA 5-10 (A) 0 - 2 /HPF    Epithelial Cells, UA MODERATE /HPF    Bacteria, UA MODERATE (A) None Seen /HPF   Troponin    Collection Time: 06/14/21  4:53 PM   Result Value Ref Range    Troponin, High Sensitivity 54 (H) 0 - 9 ng/L   Troponin    Collection Time: 06/14/21 10:20 PM   Result Value Ref Range    Troponin, High Sensitivity 47 (H) 0 - 9 ng/L   POCT Glucose    Collection Time: 06/14/21 10:27 PM   Result Value Ref Range    Meter Glucose 198 (H) 74 - 99 mg/dL   POCT Glucose    Collection Time: 06/15/21  6:05 AM   Result Value Ref Range    Meter Glucose 186 (H) 74 - 99 mg/dL       No orders to display           ASSESSMENT :      Active Problems:    UTI (urinary tract infection)  Resolved Problems:    * No resolved hospital problems.  *  Diabetes mellitus type 2 uncontrolled  Low back pain with multiple falls  Possible syncope  Morbid obesity  Patient on psychotropic medications question of bipolar disorder  Hypertension by history  Hypothyroidism by history check TSH    Plan :    Carb controlled diet  Nesina, Metformin  Blood sugar checks with hypoglycemia protocol  Sliding scale

## 2021-06-15 NOTE — PROGRESS NOTES
safety, compensatory strategies, breathing and technique in bed mobility, functional transfers, functional mobility, toileting, grooming, UB dresing and LB dressing in preparation for maximum independence in all self care tasks. Hand Dominance: Right   Strength ROM Additional Info:    RUE  3+/5 WFL limited overhead shoulder motions fair  and FMC/dexterity noted during ADL tasks     LUE 3+/5 WFL limited end range shoulder motions fair  and FMC/dexterity noted during ADL tasks         Hearing: WFL  Vision: WFL   Sensation:  No c/o numbness or tingling   Tone: WFL   Edema: none                             Rehab Potential: Good for established goals     Patient/Family Goal: To get home. Patient and/or family were instructed on functional diagnosis, prognosis/goals and OT plan of care. Pt verbalized fair understanding. Upon arrival, patient supine in bed. At end of session, patient seated in armchair with call light and phone within reach, all lines and tubes intact. Pt would benefit from continued skilled OT to increase safety and independence with completion of ADL/IADL tasks for functional independence and quality of life.  Bed/chair alarm: ON    Low Evaluation 90219  Time In: 842   Time Out: 908  Total: 26 min    Collaboration with PT services for patient's safety d/t recent fall, pain and decreased self efficacy      Evaluation time includes thorough review of current medical information, gathering information on past medical history/social history and prior level of function, completion of standardized testing/informal observation of tasks, assessment of data, and development of POC/Goals    Ruben Pearson OTR/L  [id number]

## 2021-06-15 NOTE — PROGRESS NOTES
[facility] Quality Flow/Interdisciplinary Rounds Progress Note        Quality Flow Rounds held on [address] 15, 2021    Disciplines Attending:  Bedside Nurse, ,  and Nursing Unit Leadership    Lianne Sanchez was admitted on 6/14/2021  8:58 PM    Anticipated Discharge Date:  Expected Discharge Date: 06/18/21    Disposition:    Konstantin Score:  Konstantin Scale Score: 19    Readmission Risk              Risk of Unplanned Readmission:  13           Discussed patient goal for the day, patient clinical progression, and barriers to discharge. The following Goal(s) of the Day/Commitment(s) have been identified:  Monitor labs and vitals, safety, and discharge planning.     Ayden Stone RN  [place] 15, 2021
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
## 2021-06-16 ENCOUNTER — APPOINTMENT (OUTPATIENT)
Dept: CT IMAGING | Age: 76
DRG: 690 | End: 2021-06-16
Attending: INTERNAL MEDICINE
Payer: MEDICARE

## 2021-06-16 LAB
ANION GAP SERPL CALCULATED.3IONS-SCNC: 11 MMOL/L (ref 7–16)
BUN BLDV-MCNC: 19 MG/DL (ref 6–23)
CALCIUM SERPL-MCNC: 9.6 MG/DL (ref 8.6–10.2)
CHLORIDE BLD-SCNC: 103 MMOL/L (ref 98–107)
CO2: 25 MMOL/L (ref 22–29)
CREAT SERPL-MCNC: 1.1 MG/DL (ref 0.5–1)
EKG ATRIAL RATE: 63 BPM
EKG P AXIS: 68 DEGREES
EKG P-R INTERVAL: 180 MS
EKG Q-T INTERVAL: 450 MS
EKG QRS DURATION: 86 MS
EKG QTC CALCULATION (BAZETT): 460 MS
EKG R AXIS: 22 DEGREES
EKG T AXIS: 36 DEGREES
EKG VENTRICULAR RATE: 63 BPM
GFR AFRICAN AMERICAN: 59
GFR NON-AFRICAN AMERICAN: 49 ML/MIN/1.73
GLUCOSE BLD-MCNC: 141 MG/DL (ref 74–99)
METER GLUCOSE: 130 MG/DL (ref 74–99)
METER GLUCOSE: 163 MG/DL (ref 74–99)
METER GLUCOSE: 183 MG/DL (ref 74–99)
METER GLUCOSE: 207 MG/DL (ref 74–99)
POTASSIUM SERPL-SCNC: 3.6 MMOL/L (ref 3.5–5)
SODIUM BLD-SCNC: 139 MMOL/L (ref 132–146)
URINE CULTURE, ROUTINE: NORMAL

## 2021-06-16 PROCEDURE — 36415 COLL VENOUS BLD VENIPUNCTURE: CPT

## 2021-06-16 PROCEDURE — 82962 GLUCOSE BLOOD TEST: CPT

## 2021-06-16 PROCEDURE — 72131 CT LUMBAR SPINE W/O DYE: CPT

## 2021-06-16 PROCEDURE — 99232 SBSQ HOSP IP/OBS MODERATE 35: CPT | Performed by: INTERNAL MEDICINE

## 2021-06-16 PROCEDURE — 80048 BASIC METABOLIC PNL TOTAL CA: CPT

## 2021-06-16 PROCEDURE — 2060000000 HC ICU INTERMEDIATE R&B

## 2021-06-16 PROCEDURE — 6370000000 HC RX 637 (ALT 250 FOR IP): Performed by: INTERNAL MEDICINE

## 2021-06-16 RX ADMIN — INSULIN LISPRO 2 UNITS: 100 INJECTION, SOLUTION INTRAVENOUS; SUBCUTANEOUS at 17:40

## 2021-06-16 RX ADMIN — LEVOTHYROXINE SODIUM 75 MCG: 75 TABLET ORAL at 05:57

## 2021-06-16 RX ADMIN — PANCRELIPASE 36000 UNITS: 60000; 12000; 38000 CAPSULE, DELAYED RELEASE PELLETS ORAL at 17:40

## 2021-06-16 RX ADMIN — ALOGLIPTIN 12.5 MG: 12.5 TABLET, FILM COATED ORAL at 09:10

## 2021-06-16 RX ADMIN — BUSPIRONE HYDROCHLORIDE 7.5 MG: 5 TABLET ORAL at 20:32

## 2021-06-16 RX ADMIN — VILAZODONE HYDROCHLORIDE 40 MG: 40 TABLET ORAL at 09:10

## 2021-06-16 RX ADMIN — PREGABALIN 100 MG: 50 CAPSULE ORAL at 09:11

## 2021-06-16 RX ADMIN — HYDROCHLOROTHIAZIDE 25 MG: 25 TABLET ORAL at 09:11

## 2021-06-16 RX ADMIN — PANCRELIPASE 36000 UNITS: 60000; 12000; 38000 CAPSULE, DELAYED RELEASE PELLETS ORAL at 11:49

## 2021-06-16 RX ADMIN — HYDROCODONE BITARTRATE AND ACETAMINOPHEN 1 TABLET: 5; 325 TABLET ORAL at 20:32

## 2021-06-16 RX ADMIN — PANTOPRAZOLE SODIUM 40 MG: 40 TABLET, DELAYED RELEASE ORAL at 05:57

## 2021-06-16 RX ADMIN — BUSPIRONE HYDROCHLORIDE 7.5 MG: 5 TABLET ORAL at 09:09

## 2021-06-16 RX ADMIN — LORAZEPAM 0.5 MG: 0.5 TABLET ORAL at 20:32

## 2021-06-16 RX ADMIN — DICYCLOMINE HYDROCHLORIDE 10 MG: 10 CAPSULE ORAL at 11:49

## 2021-06-16 RX ADMIN — DICYCLOMINE HYDROCHLORIDE 10 MG: 10 CAPSULE ORAL at 20:32

## 2021-06-16 RX ADMIN — PANCRELIPASE 36000 UNITS: 60000; 12000; 38000 CAPSULE, DELAYED RELEASE PELLETS ORAL at 09:09

## 2021-06-16 RX ADMIN — PREGABALIN 100 MG: 50 CAPSULE ORAL at 15:19

## 2021-06-16 RX ADMIN — INSULIN LISPRO 2 UNITS: 100 INJECTION, SOLUTION INTRAVENOUS; SUBCUTANEOUS at 20:35

## 2021-06-16 RX ADMIN — METFORMIN HYDROCHLORIDE 500 MG: 500 TABLET ORAL at 17:40

## 2021-06-16 RX ADMIN — PREGABALIN 100 MG: 50 CAPSULE ORAL at 20:32

## 2021-06-16 RX ADMIN — ARIPIPRAZOLE 5 MG: 5 TABLET ORAL at 09:11

## 2021-06-16 RX ADMIN — LISINOPRIL 20 MG: 20 TABLET ORAL at 09:10

## 2021-06-16 RX ADMIN — DICYCLOMINE HYDROCHLORIDE 10 MG: 10 CAPSULE ORAL at 15:19

## 2021-06-16 RX ADMIN — INSULIN LISPRO 2 UNITS: 100 INJECTION, SOLUTION INTRAVENOUS; SUBCUTANEOUS at 11:49

## 2021-06-16 RX ADMIN — PREGABALIN 100 MG: 50 CAPSULE ORAL at 17:39

## 2021-06-16 RX ADMIN — METFORMIN HYDROCHLORIDE 500 MG: 500 TABLET ORAL at 09:09

## 2021-06-16 RX ADMIN — HYDROCODONE BITARTRATE AND ACETAMINOPHEN 1 TABLET: 5; 325 TABLET ORAL at 00:22

## 2021-06-16 RX ADMIN — LORAZEPAM 0.5 MG: 0.5 TABLET ORAL at 09:10

## 2021-06-16 RX ADMIN — DICYCLOMINE HYDROCHLORIDE 10 MG: 10 CAPSULE ORAL at 05:57

## 2021-06-16 RX ADMIN — ASPIRIN 81 MG: 81 TABLET, COATED ORAL at 09:10

## 2021-06-16 RX ADMIN — HYDROCODONE BITARTRATE AND ACETAMINOPHEN 1 TABLET: 5; 325 TABLET ORAL at 11:49

## 2021-06-16 RX ADMIN — ATORVASTATIN CALCIUM 20 MG: 20 TABLET, FILM COATED ORAL at 09:10

## 2021-06-16 RX ADMIN — BUSPIRONE HYDROCHLORIDE 7.5 MG: 5 TABLET ORAL at 15:21

## 2021-06-16 RX ADMIN — METOPROLOL SUCCINATE 25 MG: 25 TABLET, EXTENDED RELEASE ORAL at 09:10

## 2021-06-16 ASSESSMENT — PAIN SCALES - GENERAL
PAINLEVEL_OUTOF10: 0
PAINLEVEL_OUTOF10: 6
PAINLEVEL_OUTOF10: 7
PAINLEVEL_OUTOF10: 6

## 2021-06-16 ASSESSMENT — PAIN DESCRIPTION - DESCRIPTORS: DESCRIPTORS: ACHING;DISCOMFORT

## 2021-06-16 ASSESSMENT — PAIN DESCRIPTION - FREQUENCY: FREQUENCY: CONTINUOUS

## 2021-06-16 ASSESSMENT — PAIN DESCRIPTION - LOCATION: LOCATION: BACK;CHEST;FACE

## 2021-06-16 NOTE — PROGRESS NOTES
INPATIENT CARDIOLOGY FOLLOW-UP    Name: Courtney Boyd    Age: 70 y.o. Date of Admission: 6/14/2021  8:58 PM    Date of Service: 6/16/2021    Chief Complaint: Follow-up for elevated troponin    Interim History:  Currently with no chest pain, respiratory distress, or palpitations. SR on EKG and telemetry. No new overnight cardiac complaints.     Review of Systems:   Cardiac: As per HPI  General: No fever, chills  Pulmonary: As per HPI  HEENT: No visual disturbances, difficult swallowing  GI: No nausea, vomiting  : No dysuria, hematuria  Endocrine: +hypothyroidism, +DM  Musculoskeletal: MAYO x 4, no focal motor deficits  Skin: Intact, no rashes  Neuro: No headache, seizures  Psych: Currently with no depression, anxiety    Problem List:  Patient Active Problem List   Diagnosis    Subcutaneous mass    Hypotension    Diabetes mellitus ([address]    Syncope and collapse    Severe protein-calorie malnutrition (HCC)    UTI (urinary tract infection)       Allergies:  No Known Allergies    Current Medications:  Current Facility-Administered Medications   Medication Dose Route Frequency Provider Last Rate Last Admin    perflutren lipid microspheres (DEFINITY) injection 1.65 mg  1.5 mL Intravenous ONCE PRN Ace Furl, APRN - CNP        LORazepam (ATIVAN) tablet 0.5 mg  0.5 mg Oral BID Evie Yañez MD   0.5 mg at 06/16/21 0910    metFORMIN (GLUCOPHAGE) tablet 500 mg  500 mg Oral BID WC Evie Yañez MD   500 mg at 06/16/21 0909    levothyroxine (SYNTHROID) tablet 75 mcg  75 mcg Oral Daily Evie Yañez MD   75 mcg at 06/16/21 0557    atorvastatin (LIPITOR) tablet 20 mg  20 mg Oral Daily Evie Yañez MD   20 mg at 06/16/21 0910    pregabalin (LYRICA) capsule 100 mg  100 mg Oral 4x Daily Evie Yañez MD   100 mg at 06/16/21 0911    vilazodone HCl (VIIBRYD) TABS 40 mg  40 mg Oral Daily Evie Yañez MD   40 mg at 06/16/21 0910    pantoprazole (PROTONIX) tablet 40 mg  40 mg Oral QAM AC Jovita Schneider MD   40 mg at 06/16/21 0557    dicyclomine (BENTYL) capsule 10 mg  10 mg Oral 4x Daily AC & HS Jovita Schneider MD   10 mg at 06/16/21 0557    lipase-protease-amylase (CREON) delayed release capsule 36,000 Units  36,000 Units Oral TID WC Jovita Schneider MD   36,000 Units at 06/16/21 0909    metoprolol succinate (TOPROL XL) extended release tablet 25 mg  25 mg Oral Daily Jovita Schneider MD   25 mg at 06/16/21 0910    aspirin EC tablet 81 mg  81 mg Oral Daily Jovita Schneider MD   81 mg at 06/16/21 0910    busPIRone (BUSPAR) tablet 7.5 mg  7.5 mg Oral TID Jovita Schneider MD   7.5 mg at 06/16/21 0909    ARIPiprazole (ABILIFY) tablet 5 mg  5 mg Oral Daily Jovita Schneider MD   5 mg at 06/16/21 0911    HYDROcodone-acetaminophen (NORCO) 5-325 MG per tablet 1 tablet  1 tablet Oral Q6H PRN Jovita Schneider MD   1 tablet at 06/16/21 0022    insulin lispro (HUMALOG) injection vial 0-12 Units  0-12 Units Subcutaneous TID WC Jovita Schneider MD   4 Units at 06/15/21 1115    insulin lispro (HUMALOG) injection vial 0-6 Units  0-6 Units Subcutaneous Nightly Jovita Schneider MD   1 Units at 06/15/21 2155    glucose (GLUTOSE) 40 % oral gel 15 g  15 g Oral PRN Jovita Schneider MD        dextrose 50 % IV solution  12.5 g Intravenous PRN Jovita Schneider MD        glucagon (rDNA) injection 1 mg  1 mg Intramuscular PRN Jovita Schneider MD        dextrose 5 % solution  100 mL/hr Intravenous PRN Jovita Schneider MD        alogliptin (NESINA) tablet 12.5 mg  12.5 mg Oral Daily Jovita Schneider MD   12.5 mg at 06/16/21 0910    lisinopril (PRINIVIL;ZESTRIL) tablet 20 mg  20 mg Oral Daily Jovita Schneider MD   20 mg at 06/16/21 0910    And    hydroCHLOROthiazide (HYDRODIURIL) tablet 25 mg  25 mg Oral Daily Jovita Schneider MD   25 mg at 06/16/21 0911      dextrose         Physical Exam:  BP (!) 117/58   Pulse 72   Temp 96.5 °F (35.8 °C) (Axillary)   Resp 18   Ht 5' (1.524 m)   Wt 170 lb (77.1 kg)   SpO2 94%   BMI 33.20 kg/m²   Wt Readings from Last 3 Encounters:   06/15/21 170 lb (77.1 kg)   06/14/21 170 lb (77.1 kg)   07/11/19 124 lb (56.2 kg)     Appearance: Awake, alert, no acute respiratory distress  Skin: Intact, no rash  Head: Normocephalic, atraumatic  Eyes: EOMI, no conjunctival erythema  ENMT: No pharyngeal erythema, MMM, no rhinorrhea  Neck: Supple, no elevated JVP, no carotid bruits  Lungs: Clear to auscultation bilaterally. No wheezes, rales, or rhonchi. Cardiac: Regular rate and rhythm, +S1S2, no murmurs apparent  Abdomen: Soft, nontender, +bowel sounds  Extremities: Moves all extremities x 4, no lower extremity edema  Neurologic: No focal motor deficits apparent, normal mood and affect    Intake/Output:  No intake or output data in the 24 hours ending 06/16/21 1135  No intake/output data recorded.     Laboratory Tests:  Recent Labs     06/14/21  1550 06/15/21  0930 06/16/21  0530    138 139   K 3.8 3.5 3.6    101 103   CO2 26 24 25   BUN 21 19 19   CREATININE 1.0 1.0 1.1*   GLUCOSE 223* 233* 141*   CALCIUM 10.4* 9.8 9.6     Lab Results   Component Value Date    MG 2.0 08/20/2015     Recent Labs     06/14/21  1550   ALKPHOS 80   ALT 14   AST 20   PROT 7.9   BILITOT 0.5   LABALBU 4.6     Recent Labs     06/14/21  1550   WBC 12.3*   RBC 4.82   HGB 14.7   HCT 43.9   MCV 91.1   MCH 30.5   MCHC 33.5   RDW 14.2      MPV 9.5     Lab Results   Component Value Date    CKTOTAL 177 06/14/2021    CKMB 3.8 08/19/2015    TROPONINI <0.01 07/09/2019    TROPONINI <0.01 02/25/2017    TROPONINI 0.01 08/19/2015     Lab Results   Component Value Date    INR 1.0 06/14/2021    INR 1.2 07/09/2019    INR 1.0 08/19/2015    PROTIME 11.1 06/14/2021    PROTIME 14.1 (H) 07/09/2019    PROTIME 11.2 08/19/2015     Lab Results   Component Value Date    TSH 1.480 06/15/2021     Lab Results   Component Value Date    LABA1C 5.8 02/07/2018     No results found for: EAG  Lab Results   Component Value Date    CHOL 164 11/13/2015     Lab Results   Component Value Date    TRIG 52 11/13/2015     Lab Results   Component Value Date    HDL 77 11/13/2015     Lab Results   Component Value Date    LDLCALC 77 11/13/2015     Lab Results   Component Value Date    LABVLDL 10 11/13/2015     No results found for: CHOLHDLRATIO  No results for input(s): PROBNP in the last 72 hours. Cardiac Tests:  EKG reviewed (EKG date: 6/14/21): SR,rate 63, NSSTT changes     Telemetry reviewed (date: 6/16/2021): SR, rate 70's    Echocardiogram: 6/15/21 (Dr. Leslie Guan)   Normal left ventricular systolic function. Ejection fraction is visually estimated at 60%. Normal right ventricular size and function. There is doppler evidence of stage I diastolic dysfunction. Mild tricuspid regurgitation. PASP is estimated at 42 mmHg. ASSESSMENT / PLAN:  79-year-old female admitted with mechanical fall (6/14/2021) / history of recurrent falls. CT chest showing multiple chronic right sided rib fractures and chronic right clavicle fracture. CT head unremarkable. +Laceration (3 cm) to chin. Orthostatic BP negative. ? Elevated hs-cTnT: pattern not consistent with ACS (60, 54, 47) with no rise in total CK. No acute STT changes. ? Hx of CABG x 1 (LIMA-LAD) 10/2016 ([facility])  ? Brief run of PAT  ? Remote tobacco abuse: Quit in 1/2021 (0.5-1 PPD x 55 years)  ? Hypertension: Controlled  ? Hyperlipidemia: On statin therapy  ? Hypothyroidism: On replacement therapy  ? T2DM  ? Chronic low back pain /with injection  ? Urinary hesitancy with history of recent bladder Botox  ?  Tremor on clinical exam     - Patient denies syncope / +history of falls  - Echocardiogram results reviewed with the patient today  - HCTZ stopped on admission  - Consider noncardiac medications contributing to symptoms (Abilify, Buspar, Bentyl)  - Maintain adequate hydration   - Consider

## 2021-06-16 NOTE — PROGRESS NOTES
[facility] Quality Flow/Interdisciplinary Rounds Progress Note        Quality Flow Rounds held on June 16, 2021    Disciplines Attending:  Bedside Nurse, ,  and Nursing Unit Leadership    Kylie Franklin was admitted on 6/14/2021  8:58 PM    Anticipated Discharge Date:  Expected Discharge Date: 06/18/21    Disposition:    Konstantin Score:  Konstantin Scale Score: 19    Readmission Risk              Risk of Unplanned Readmission:  14           Discussed patient goal for the day, patient clinical progression, and barriers to discharge. The following Goal(s) of the Day/Commitment(s) have been identified:  Monitor labs and vitals, for CT lumbar/spine today, and discharge planning home.     Abeba Cleary RN  June 16, 2021

## 2021-06-16 NOTE — PROGRESS NOTES
Subjective:    Chief complaint:    Patient is feeling about the same  Feels weak  Objective:    BP (!) 117/58   Pulse 72   Temp 96.5 °F (35.8 °C) (Axillary)   Resp 18   Ht 5' (1.524 m)   Wt 170 lb (77.1 kg)   SpO2 94%   BMI 33.20 kg/m²   General : Awake ,alert,no distress. Heart:  RRR, no murmurs, gallops, or rubs. Lungs:  CTA bilaterally, no wheeze, rales or rhonchi  Abd: bowel sounds present, nontender, nondistended, no masses  Extrem:  No clubbing, cyanosis, or edema  Diffuse bruising noted    CBC:   Lab Results   Component Value Date    WBC 12.3 06/14/2021    RBC 4.82 06/14/2021    HGB 14.7 06/14/2021    HCT 43.9 06/14/2021    MCV 91.1 06/14/2021    MCH 30.5 06/14/2021    MCHC 33.5 06/14/2021    RDW 14.2 06/14/2021     06/14/2021    MPV 9.5 06/14/2021     BMP:    Lab Results   Component Value Date     06/16/2021    K 3.6 06/16/2021    K 3.8 06/14/2021     06/16/2021    CO2 25 06/16/2021    BUN 19 06/16/2021    LABALBU 4.6 06/14/2021    CREATININE 1.1 06/16/2021    CALCIUM 9.6 06/16/2021    GFRAA 59 06/16/2021    LABGLOM 49 06/16/2021    GLUCOSE 141 06/16/2021     PT/INR:    Lab Results   Component Value Date    PROTIME 11.1 06/14/2021    INR 1.0 06/14/2021     Troponin:    Lab Results   Component Value Date    TROPONINI <0.01 07/09/2019       Recent Labs     06/14/21  1550   LABURIN 10 to 100,000 CFU/mL  Mixed maria fernanda isolated. Further workup and sensitivity testing  is not routinely indicated and will not be performed. Mixed maria fernanda isolated includes:  Gram negative rods  Mixed gram positive organisms  Gram positive rods  Yeast  Strep species       No results for input(s): BC in the last 72 hours. No results for input(s): Sivan Sill in the last 72 hours.       Current Facility-Administered Medications:     perflutren lipid microspheres (DEFINITY) injection 1.65 mg, 1.5 mL, Intravenous, ONCE PRN, Marcia Ng, APRN - CNP    LORazepam (ATIVAN) tablet 0.5 mg, 0.5 mg, Oral, BID, Mel Damon MD, 0.5 mg at 06/16/21 0910    metFORMIN (GLUCOPHAGE) tablet 500 mg, 500 mg, Oral, BID WC, Mel Damon MD, 500 mg at 06/16/21 0909    levothyroxine (SYNTHROID) tablet 75 mcg, 75 mcg, Oral, Daily, Mel Damon MD, 75 mcg at 06/16/21 0557    atorvastatin (LIPITOR) tablet 20 mg, 20 mg, Oral, Daily, Mel Damon MD, 20 mg at 06/16/21 0910    pregabalin (LYRICA) capsule 100 mg, 100 mg, Oral, 4x Daily, Mel Damon MD, 100 mg at 06/16/21 1519    vilazodone HCl (VIIBRYD) TABS 40 mg, 40 mg, Oral, Daily, Mel Damon MD, 40 mg at 06/16/21 0910    pantoprazole (PROTONIX) tablet 40 mg, 40 mg, Oral, QAM AC, Mel Damon MD, 40 mg at 06/16/21 0557    dicyclomine (BENTYL) capsule 10 mg, 10 mg, Oral, 4x Daily AC & HS, Mel Damon MD, 10 mg at 06/16/21 1519    lipase-protease-amylase (CREON) delayed release capsule 36,000 Units, 36,000 Units, Oral, TID WC, Mel Damon MD, 36,000 Units at 06/16/21 1149    metoprolol succinate (TOPROL XL) extended release tablet 25 mg, 25 mg, Oral, Daily, Mel Damon MD, 25 mg at 06/16/21 0910    aspirin EC tablet 81 mg, 81 mg, Oral, Daily, Mel Damon MD, 81 mg at 06/16/21 0910    busPIRone (BUSPAR) tablet 7.5 mg, 7.5 mg, Oral, TID, Mel Damon MD, 7.5 mg at 06/16/21 1521    ARIPiprazole (ABILIFY) tablet 5 mg, 5 mg, Oral, Daily, Mel Damon MD, 5 mg at 06/16/21 0911    HYDROcodone-acetaminophen (NORCO) 5-325 MG per tablet 1 tablet, 1 tablet, Oral, Q6H PRN, Mel Damon MD, 1 tablet at 06/16/21 1149    insulin lispro (HUMALOG) injection vial 0-12 Units, 0-12 Units, Subcutaneous, TID WC, Mel Damon MD, 2 Units at 06/16/21 1149    insulin lispro (HUMALOG) injection vial 0-6 Units, 0-6 Units, Subcutaneous, Nightly, Mel Damon MD, 1 Units at 06/15/21 2906    glucose (GLUTOSE) 40 % oral gel 15 g, 15 g, Oral, PRN, Anirudh BOND Dhaval Orozco MD    dextrose 50 % IV solution, 12.5 g, Intravenous, PRN, Lois Miramontes MD    glucagon (rDNA) injection 1 mg, 1 mg, Intramuscular, PRN, Lois Miramontes MD    dextrose 5 % solution, 100 mL/hr, Intravenous, PRN, Lois Miramontes MD    alogliptin (NESINA) tablet 12.5 mg, 12.5 mg, Oral, Daily, Lois Miramontes MD, 12.5 mg at 06/16/21 0910    lisinopril (PRINIVIL;ZESTRIL) tablet 20 mg, 20 mg, Oral, Daily, 20 mg at 06/16/21 0910 **AND** hydroCHLOROthiazide (HYDRODIURIL) tablet 25 mg, 25 mg, Oral, Daily, Lois Miramontes MD, 25 mg at 06/16/21 1816    ADULT DIET; Regular; 4 carb choices (60 gm/meal)    CT LUMBAR SPINE WO CONTRAST    (Results Pending)       Assessment:    Active Problems:    UTI (urinary tract infection)  Resolved Problems:    * No resolved hospital problems. *  Diabetes is by history  Questionable syncope patient denies  Hyperlipidemia by history hypothyroidism by history  Chronic pain syndrome  Low back pain by history    Plan:    Procalcitonin is low at 0.06  Patient likely has asymptomatic bacteriuria    CT lumbar spine given leg weakness  Subacute rehab versus home with home care  Patient has been falling a lot and would likely benefit from subacute rehab from safety perspective      Lois Miramontes MD  3:57 PM  6/16/2021    NOTE: This report was transcribed using voice recognition software.  Every effort was made to ensure accuracy; however, inadvertent transcription errors may be present

## 2021-06-17 LAB
ANION GAP SERPL CALCULATED.3IONS-SCNC: 11 MMOL/L (ref 7–16)
BUN BLDV-MCNC: 30 MG/DL (ref 6–23)
CALCIUM SERPL-MCNC: 9.5 MG/DL (ref 8.6–10.2)
CHLORIDE BLD-SCNC: 103 MMOL/L (ref 98–107)
CO2: 27 MMOL/L (ref 22–29)
CREAT SERPL-MCNC: 1.8 MG/DL (ref 0.5–1)
GFR AFRICAN AMERICAN: 33
GFR NON-AFRICAN AMERICAN: 28 ML/MIN/1.73
GLUCOSE BLD-MCNC: 252 MG/DL (ref 74–99)
METER GLUCOSE: 122 MG/DL (ref 74–99)
METER GLUCOSE: 195 MG/DL (ref 74–99)
METER GLUCOSE: 227 MG/DL (ref 74–99)
METER GLUCOSE: 245 MG/DL (ref 74–99)
POTASSIUM SERPL-SCNC: 3.9 MMOL/L (ref 3.5–5)
SODIUM BLD-SCNC: 141 MMOL/L (ref 132–146)

## 2021-06-17 PROCEDURE — 97535 SELF CARE MNGMENT TRAINING: CPT

## 2021-06-17 PROCEDURE — 36415 COLL VENOUS BLD VENIPUNCTURE: CPT

## 2021-06-17 PROCEDURE — 6370000000 HC RX 637 (ALT 250 FOR IP): Performed by: INTERNAL MEDICINE

## 2021-06-17 PROCEDURE — 2580000003 HC RX 258: Performed by: INTERNAL MEDICINE

## 2021-06-17 PROCEDURE — 80048 BASIC METABOLIC PNL TOTAL CA: CPT

## 2021-06-17 PROCEDURE — 97530 THERAPEUTIC ACTIVITIES: CPT

## 2021-06-17 PROCEDURE — 82962 GLUCOSE BLOOD TEST: CPT

## 2021-06-17 PROCEDURE — 2060000000 HC ICU INTERMEDIATE R&B

## 2021-06-17 RX ORDER — SODIUM CHLORIDE 9 MG/ML
INJECTION, SOLUTION INTRAVENOUS CONTINUOUS
Status: DISCONTINUED | OUTPATIENT
Start: 2021-06-17 | End: 2021-06-18

## 2021-06-17 RX ADMIN — DICYCLOMINE HYDROCHLORIDE 10 MG: 10 CAPSULE ORAL at 05:53

## 2021-06-17 RX ADMIN — PREGABALIN 100 MG: 50 CAPSULE ORAL at 16:55

## 2021-06-17 RX ADMIN — DICYCLOMINE HYDROCHLORIDE 10 MG: 10 CAPSULE ORAL at 11:14

## 2021-06-17 RX ADMIN — ALOGLIPTIN 12.5 MG: 12.5 TABLET, FILM COATED ORAL at 08:59

## 2021-06-17 RX ADMIN — METFORMIN HYDROCHLORIDE 500 MG: 500 TABLET ORAL at 17:00

## 2021-06-17 RX ADMIN — PREGABALIN 100 MG: 50 CAPSULE ORAL at 08:59

## 2021-06-17 RX ADMIN — PANTOPRAZOLE SODIUM 40 MG: 40 TABLET, DELAYED RELEASE ORAL at 05:53

## 2021-06-17 RX ADMIN — INSULIN LISPRO 4 UNITS: 100 INJECTION, SOLUTION INTRAVENOUS; SUBCUTANEOUS at 16:55

## 2021-06-17 RX ADMIN — BUSPIRONE HYDROCHLORIDE 7.5 MG: 5 TABLET ORAL at 21:38

## 2021-06-17 RX ADMIN — INSULIN LISPRO 2 UNITS: 100 INJECTION, SOLUTION INTRAVENOUS; SUBCUTANEOUS at 22:14

## 2021-06-17 RX ADMIN — PREGABALIN 100 MG: 50 CAPSULE ORAL at 13:37

## 2021-06-17 RX ADMIN — LISINOPRIL 20 MG: 20 TABLET ORAL at 09:00

## 2021-06-17 RX ADMIN — SODIUM CHLORIDE: 9 INJECTION, SOLUTION INTRAVENOUS at 11:13

## 2021-06-17 RX ADMIN — PANCRELIPASE 36000 UNITS: 60000; 12000; 38000 CAPSULE, DELAYED RELEASE PELLETS ORAL at 16:55

## 2021-06-17 RX ADMIN — HYDROCHLOROTHIAZIDE 25 MG: 25 TABLET ORAL at 09:00

## 2021-06-17 RX ADMIN — ATORVASTATIN CALCIUM 20 MG: 20 TABLET, FILM COATED ORAL at 09:00

## 2021-06-17 RX ADMIN — LORAZEPAM 0.5 MG: 0.5 TABLET ORAL at 21:38

## 2021-06-17 RX ADMIN — PANCRELIPASE 36000 UNITS: 60000; 12000; 38000 CAPSULE, DELAYED RELEASE PELLETS ORAL at 11:38

## 2021-06-17 RX ADMIN — PREGABALIN 100 MG: 50 CAPSULE ORAL at 21:37

## 2021-06-17 RX ADMIN — METFORMIN HYDROCHLORIDE 500 MG: 500 TABLET ORAL at 09:00

## 2021-06-17 RX ADMIN — METOPROLOL SUCCINATE 25 MG: 25 TABLET, EXTENDED RELEASE ORAL at 09:01

## 2021-06-17 RX ADMIN — BUSPIRONE HYDROCHLORIDE 7.5 MG: 5 TABLET ORAL at 13:38

## 2021-06-17 RX ADMIN — LORAZEPAM 0.5 MG: 0.5 TABLET ORAL at 09:00

## 2021-06-17 RX ADMIN — DICYCLOMINE HYDROCHLORIDE 10 MG: 10 CAPSULE ORAL at 21:38

## 2021-06-17 RX ADMIN — DICYCLOMINE HYDROCHLORIDE 10 MG: 10 CAPSULE ORAL at 16:55

## 2021-06-17 RX ADMIN — ARIPIPRAZOLE 5 MG: 5 TABLET ORAL at 09:00

## 2021-06-17 RX ADMIN — ASPIRIN 81 MG: 81 TABLET, COATED ORAL at 08:59

## 2021-06-17 RX ADMIN — INSULIN LISPRO 2 UNITS: 100 INJECTION, SOLUTION INTRAVENOUS; SUBCUTANEOUS at 11:16

## 2021-06-17 RX ADMIN — VILAZODONE HYDROCHLORIDE 40 MG: 40 TABLET ORAL at 09:00

## 2021-06-17 RX ADMIN — BUSPIRONE HYDROCHLORIDE 7.5 MG: 5 TABLET ORAL at 09:00

## 2021-06-17 RX ADMIN — LEVOTHYROXINE SODIUM 75 MCG: 75 TABLET ORAL at 05:53

## 2021-06-17 RX ADMIN — PANCRELIPASE 36000 UNITS: 60000; 12000; 38000 CAPSULE, DELAYED RELEASE PELLETS ORAL at 08:59

## 2021-06-17 ASSESSMENT — PAIN SCALES - GENERAL
PAINLEVEL_OUTOF10: 0

## 2021-06-17 NOTE — PROGRESS NOTES
Subjective:    Chief complaint:  Denies new complaints  Reports that she is taking in p.o. without issue    Objective:    BP (!) 103/56   Pulse 71   Temp 97.7 °F (36.5 °C) (Oral)   Resp 18   Ht 5' (1.524 m)   Wt 170 lb (77.1 kg)   SpO2 92%   BMI 33.20 kg/m²   General : Awake ,alert,no distress. Heart:  RRR, no murmurs, gallops, or rubs. Lungs:  CTA bilaterally, no wheeze, rales or rhonchi  Abd: bowel sounds present, nontender, nondistended, no masses  Extrem:  No clubbing, cyanosis, or edema  Diffuse bruising noted    CBC:   Lab Results   Component Value Date    WBC 12.3 06/14/2021    RBC 4.82 06/14/2021    HGB 14.7 06/14/2021    HCT 43.9 06/14/2021    MCV 91.1 06/14/2021    MCH 30.5 06/14/2021    MCHC 33.5 06/14/2021    RDW 14.2 06/14/2021     06/14/2021    MPV 9.5 06/14/2021     BMP:    Lab Results   Component Value Date     06/17/2021    K 3.9 06/17/2021    K 3.8 06/14/2021     06/17/2021    CO2 27 06/17/2021    BUN 30 06/17/2021    LABALBU 4.6 06/14/2021    CREATININE 1.8 06/17/2021    CALCIUM 9.5 06/17/2021    GFRAA 33 06/17/2021    LABGLOM 28 06/17/2021    GLUCOSE 252 06/17/2021     PT/INR:    Lab Results   Component Value Date    PROTIME 11.1 06/14/2021    INR 1.0 06/14/2021     Troponin:    Lab Results   Component Value Date    TROPONINI <0.01 07/09/2019       Recent Labs     06/14/21  1550   LABURIN 10 to 100,000 CFU/mL  Mixed maria fernanda isolated. Further workup and sensitivity testing  is not routinely indicated and will not be performed. Mixed maria fernanda isolated includes:  Gram negative rods  Mixed gram positive organisms  Gram positive rods  Yeast  Strep species       No results for input(s): BC in the last 72 hours. No results for input(s): Sophia Gutierrez in the last 72 hours.       Current Facility-Administered Medications:     0.9 % sodium chloride infusion, , Intravenous, Continuous, Evie Yañez MD, Last Rate: 75 mL/hr at 06/17/21 1113, New Bag at 06/17/21 1113   perflutren lipid microspheres (DEFINITY) injection 1.65 mg, 1.5 mL, Intravenous, ONCE PRN, YVONNE Grider - CNP    LORazepam (ATIVAN) tablet 0.5 mg, 0.5 mg, Oral, BID, Ryder Webb MD, 0.5 mg at 06/17/21 0900    metFORMIN (GLUCOPHAGE) tablet 500 mg, 500 mg, Oral, BID WC, Ryder Webb MD, 500 mg at 06/17/21 0900    levothyroxine (SYNTHROID) tablet 75 mcg, 75 mcg, Oral, Daily, Ryder Webb MD, 75 mcg at 06/17/21 0553    atorvastatin (LIPITOR) tablet 20 mg, 20 mg, Oral, Daily, Ryder Webb MD, 20 mg at 06/17/21 0900    pregabalin (LYRICA) capsule 100 mg, 100 mg, Oral, 4x Daily, Ryder Webb MD, 100 mg at 06/17/21 0859    vilazodone HCl (VIIBRYD) TABS 40 mg, 40 mg, Oral, Daily, Ryder Webb MD, 40 mg at 06/17/21 0900    pantoprazole (PROTONIX) tablet 40 mg, 40 mg, Oral, QAM AC, Ryder Webb MD, 40 mg at 06/17/21 0553    dicyclomine (BENTYL) capsule 10 mg, 10 mg, Oral, 4x Daily AC & HS, Ryder Webb MD, 10 mg at 06/17/21 1114    lipase-protease-amylase (CREON) delayed release capsule 36,000 Units, 36,000 Units, Oral, TID WC, Ryder Webb MD, 36,000 Units at 06/17/21 1138    metoprolol succinate (TOPROL XL) extended release tablet 25 mg, 25 mg, Oral, Daily, Ryder Webb MD, 25 mg at 06/17/21 0901    aspirin EC tablet 81 mg, 81 mg, Oral, Daily, Ryder Webb MD, 81 mg at 06/17/21 0859    busPIRone (BUSPAR) tablet 7.5 mg, 7.5 mg, Oral, TID, Ryder Webb MD, 7.5 mg at 06/17/21 0900    ARIPiprazole (ABILIFY) tablet 5 mg, 5 mg, Oral, Daily, Ryder Webb MD, 5 mg at 06/17/21 0900    HYDROcodone-acetaminophen (NORCO) 5-325 MG per tablet 1 tablet, 1 tablet, Oral, Q6H PRN, Ryder Webb MD, 1 tablet at 06/16/21 2032    insulin lispro (HUMALOG) injection vial 0-12 Units, 0-12 Units, Subcutaneous, TID WC, Ryder Webb MD, 2 Units at 06/17/21 1116    insulin lispro (HUMALOG) injection vial 0-6 Units, 0-6 Units, Subcutaneous, Nightly, Zach Aranda MD, 2 Units at 06/16/21 2035    glucose (GLUTOSE) 40 % oral gel 15 g, 15 g, Oral, PRN, Zach Aranda MD    dextrose 50 % IV solution, 12.5 g, Intravenous, PRN, Zach Aarnda MD    glucagon (rDNA) injection 1 mg, 1 mg, Intramuscular, PRN, Zach Aranda MD    dextrose 5 % solution, 100 mL/hr, Intravenous, PRN, Zach Aranda MD    alogliptin (NESINA) tablet 12.5 mg, 12.5 mg, Oral, Daily, Zach Aranda MD, 12.5 mg at 06/17/21 0859    lisinopril (PRINIVIL;ZESTRIL) tablet 20 mg, 20 mg, Oral, Daily, 20 mg at 06/17/21 0900 **AND** hydroCHLOROthiazide (HYDRODIURIL) tablet 25 mg, 25 mg, Oral, Daily, Zach Aranda MD, 25 mg at 06/17/21 0900    ADULT DIET; Regular; 4 carb choices (60 gm/meal)    CT LUMBAR SPINE WO CONTRAST   Final Result   Degenerative changes and other chronic appearing findings. MRI would be   useful if symptoms persist.             Assessment:    Active Problems:    UTI (urinary tract infection)  Resolved Problems:    * No resolved hospital problems. *  Diabetes is by history  Questionable syncope patient denies  Hyperlipidemia by history hypothyroidism by history  Chronic pain syndrome  Low back pain by history    Plan:    CT lumbar spine results noted  Her physical therapy scores are decent  Plan is to discharge home  Bump in renal function noted today  IV hydration  Check renal function tomorrow  Likely discharge home tomorrow      Zach Aranda MD  12:39 PM  6/17/2021    NOTE: This report was transcribed using voice recognition software.  Every effort was made to ensure accuracy; however, inadvertent transcription errors may be present

## 2021-06-17 NOTE — CARE COORDINATION
Social work / Discharge Planning:       AM [facility] 17/24. Message left for patient's  to discuss discharge planning. Awaiting return call. Electronically signed by MELLO Bragg on 6/17/2021 at 11:25 AM            Return call from the patient's  Chantell Matos. He states that the discharge plan is home. Social work offered the option of home care for therapy and he declined that as well. Social work will follow.   Electronically signed by MELLO Bragg on 6/17/2021 at 11:28 AM

## 2021-06-17 NOTE — PROGRESS NOTES
Occupational Therapy  OT BEDSIDE TREATMENT NOTE      Date:2021  Patient Name: Xiomara Mcdonald  MRN: [id number]  : 1945  Room: [address] [place], [place] [address]     Evaluating OT: Kady Ellsworth OTR/L [id number]     Referring Provider: Sherri Rodriguez MD  Specific Provider Orders/Date: eval and treat 21     Diagnosis: UTI. Pt presents to ED from home with episode of fall d/t LOC. Imaging:  CT of chest: chronic R clavicle fracture, multiple chronic R sided rib fractures. Pertinent Medical History: arthritis, DM      Precautions:  fall risk,     Assessment of current deficits   [x]? Functional mobility             [x]?ADLs           [x]? Strength                  [x]? Cognition   [x]? Functional transfers           [x]? IADLs         [x]? Safety Awareness   [x]? Endurance   []? Fine Coordination              [x]? Balance      []? Vision/perception   []? Sensation     []? Gross Motor Coordination  []? ROM           []?  Delirium                   []? Motor Control      OT PLAN OF CARE   OT POC based on physician orders, patient diagnosis and results of clinical assessment     Frequency/Duration 2-5 days/wk for 10-14 days PRN   Specific OT Treatment Interventions to include:   * Instruction/training on adapted ADL techniques and AE recommendations to increase functional independence within        precautions  * Training on energy conservation strategies, correct breathing pattern and techniques to improve independence/tolerance for self-care routine  * Functional transfer/mobility training/DME recommendations for increased independence, safety, and fall prevention  * Patient/Family education to increase follow through with safety techniques and functional independence  * Recommendation of environmental modifications for increased safety with functional transfers/mobility and ADLs  * Therapeutic exercise to improve motor endurance, ROM, and functional strength for ADLs/functional transfers  * Therapeutic activities to facilitate/challenge dynamic balance, stand tolerance for increased safety and independence with ADLs     Recommended Adaptive Equipment: TBD     Home Living: Pt lives with  in a single story home. Bathroom setup: walk in shower, standard commode      Prior Level of Function: Independent with ADLs,  assists PRN with IADLs; completed functional mobility with no AD. Has cane. Driving: no,  provides transportation     Pain Level: Pt reports pain however vague in location.      Cognition: Awake and alert. Grossly oriented however does get things confused during session. Poor safety awareness. Functional Assessment: AM-PAC Daily Activity Raw Score: 16/24    Initial Eval Status  Date: 6/15/21 Treatment session:   6/17/21  STGs=LTGS  Timeframe 10-14 days      Feeding Set up   Independent   Grooming Min A  Standing sink level for hand hygiene and washing face  min A while standing at the sink. Independent   UB Dressing Min A  Donning/doffing hospital gown   Independent   LB Dressing Mod A  Donning/doffing brief and hospital pants  mod A   Pt able to slip on shoes when seated on the side of the bed. Mod I    Bathing Mod A   Mod I   Toileting Min A  Use of grab bar for support in transfer  Able to manage ashley care  Assist in clothing management Min A   For balance while standing. Pt able to complete toilet hygiene while seated.   Mod I   Bed Mobility  Supine to sit: SBA Min A supine <> sit       Functional Transfers STS: Min A progresing to SBA  Limited by decreased self efficacy Min A from bed and toilet surfaces.    Mod I   Functional Mobility SBA with [employer]  Household distance  Room <> bathroom  min A using w/w to and from bathroom. LOB 2x with assist to regain. Physical prompts required to turn and back up to surface prior to sitting. Mod I during ADLs   Balance Sitting: fair plus     Standing: fair at [address] stand balance during ADL.       Activity Tolerance Fair minus  Limited further with activity d/t chronic back and knee pain  fair  standing vandana x6-7 min with fair plus balance during self care tasks       Comments:  Pt pleasant and cooperative however does require cues for safety and cognitive aspects of ADL activity. Unsteady when standing and assist for balance during ADL. Pt returned to bed at end of the session and bed alarm activated. Education/treatment:  ADL retraining with facilitation of movement to increase self care skills. Therapeutic activity to address balance and endurance for ADL and transfers. Pt education of walker safety, transfer safety, and daily orientation. · Pt has made limited progress towards set goals.      Time In: 2:45   Time Out: 3:10     Min Units   Therapeutic Ex 55316     Therapeutic Activities 06615 85 4   ADL/Self Care 87945 15 1   Orthotic Management 96060     Neuro Re-Ed [id number]     Non-Billable Time     TOTAL TIMED TREATMENT [facility] NELSON/L [id number]

## 2021-06-18 VITALS
RESPIRATION RATE: 16 BRPM | HEART RATE: 75 BPM | TEMPERATURE: 96.9 F | SYSTOLIC BLOOD PRESSURE: 126 MMHG | OXYGEN SATURATION: 92 % | HEIGHT: 60 IN | BODY MASS INDEX: 33.38 KG/M2 | DIASTOLIC BLOOD PRESSURE: 69 MMHG | WEIGHT: 170 LBS

## 2021-06-18 LAB
ANION GAP SERPL CALCULATED.3IONS-SCNC: 10 MMOL/L (ref 7–16)
BUN BLDV-MCNC: 24 MG/DL (ref 6–23)
CALCIUM SERPL-MCNC: 9.5 MG/DL (ref 8.6–10.2)
CHLORIDE BLD-SCNC: 105 MMOL/L (ref 98–107)
CO2: 27 MMOL/L (ref 22–29)
CREAT SERPL-MCNC: 1.2 MG/DL (ref 0.5–1)
GFR AFRICAN AMERICAN: 53
GFR NON-AFRICAN AMERICAN: 44 ML/MIN/1.73
GLUCOSE BLD-MCNC: 199 MG/DL (ref 74–99)
METER GLUCOSE: 151 MG/DL (ref 74–99)
METER GLUCOSE: 160 MG/DL (ref 74–99)
METER GLUCOSE: 209 MG/DL (ref 74–99)
POTASSIUM SERPL-SCNC: 3.4 MMOL/L (ref 3.5–5)
SODIUM BLD-SCNC: 142 MMOL/L (ref 132–146)

## 2021-06-18 PROCEDURE — 6370000000 HC RX 637 (ALT 250 FOR IP): Performed by: INTERNAL MEDICINE

## 2021-06-18 PROCEDURE — 97530 THERAPEUTIC ACTIVITIES: CPT

## 2021-06-18 PROCEDURE — 82962 GLUCOSE BLOOD TEST: CPT

## 2021-06-18 PROCEDURE — 36415 COLL VENOUS BLD VENIPUNCTURE: CPT

## 2021-06-18 PROCEDURE — 80048 BASIC METABOLIC PNL TOTAL CA: CPT

## 2021-06-18 RX ORDER — ALOGLIPTIN 6.25 MG/1
6.25 TABLET, FILM COATED ORAL DAILY
Qty: 120 TABLET | Refills: 2 | Status: SHIPPED | OUTPATIENT
Start: 2021-06-19 | End: 2021-06-18

## 2021-06-18 RX ORDER — ALOGLIPTIN 6.25 MG/1
6.25 TABLET, FILM COATED ORAL DAILY
Qty: 120 TABLET | Refills: 2 | Status: ON HOLD | OUTPATIENT
Start: 2021-06-19 | End: 2021-12-14

## 2021-06-18 RX ORDER — ALOGLIPTIN 6.25 MG/1
6.25 TABLET, FILM COATED ORAL DAILY
Status: DISCONTINUED | OUTPATIENT
Start: 2021-06-18 | End: 2021-06-18 | Stop reason: HOSPADM

## 2021-06-18 RX ORDER — POTASSIUM CHLORIDE 20 MEQ/1
20 TABLET, EXTENDED RELEASE ORAL ONCE
Status: COMPLETED | OUTPATIENT
Start: 2021-06-18 | End: 2021-06-18

## 2021-06-18 RX ADMIN — ARIPIPRAZOLE 5 MG: 5 TABLET ORAL at 08:48

## 2021-06-18 RX ADMIN — METFORMIN HYDROCHLORIDE 500 MG: 500 TABLET ORAL at 08:48

## 2021-06-18 RX ADMIN — ATORVASTATIN CALCIUM 20 MG: 20 TABLET, FILM COATED ORAL at 08:49

## 2021-06-18 RX ADMIN — LISINOPRIL 20 MG: 20 TABLET ORAL at 08:49

## 2021-06-18 RX ADMIN — ALOGLIPTIN 6.25 MG: 12.5 TABLET, FILM COATED ORAL at 08:48

## 2021-06-18 RX ADMIN — DICYCLOMINE HYDROCHLORIDE 10 MG: 10 CAPSULE ORAL at 16:10

## 2021-06-18 RX ADMIN — HYDROCHLOROTHIAZIDE 25 MG: 25 TABLET ORAL at 08:49

## 2021-06-18 RX ADMIN — LEVOTHYROXINE SODIUM 75 MCG: 75 TABLET ORAL at 06:31

## 2021-06-18 RX ADMIN — METFORMIN HYDROCHLORIDE 500 MG: 500 TABLET ORAL at 16:10

## 2021-06-18 RX ADMIN — BUSPIRONE HYDROCHLORIDE 7.5 MG: 5 TABLET ORAL at 08:49

## 2021-06-18 RX ADMIN — INSULIN LISPRO 2 UNITS: 100 INJECTION, SOLUTION INTRAVENOUS; SUBCUTANEOUS at 16:12

## 2021-06-18 RX ADMIN — PANTOPRAZOLE SODIUM 40 MG: 40 TABLET, DELAYED RELEASE ORAL at 06:31

## 2021-06-18 RX ADMIN — PREGABALIN 100 MG: 50 CAPSULE ORAL at 08:49

## 2021-06-18 RX ADMIN — INSULIN LISPRO 4 UNITS: 100 INJECTION, SOLUTION INTRAVENOUS; SUBCUTANEOUS at 11:22

## 2021-06-18 RX ADMIN — PANCRELIPASE 36000 UNITS: 60000; 12000; 38000 CAPSULE, DELAYED RELEASE PELLETS ORAL at 08:48

## 2021-06-18 RX ADMIN — PANCRELIPASE 36000 UNITS: 60000; 12000; 38000 CAPSULE, DELAYED RELEASE PELLETS ORAL at 16:10

## 2021-06-18 RX ADMIN — PREGABALIN 100 MG: 50 CAPSULE ORAL at 13:33

## 2021-06-18 RX ADMIN — VILAZODONE HYDROCHLORIDE 40 MG: 40 TABLET ORAL at 08:49

## 2021-06-18 RX ADMIN — DICYCLOMINE HYDROCHLORIDE 10 MG: 10 CAPSULE ORAL at 06:31

## 2021-06-18 RX ADMIN — ASPIRIN 81 MG: 81 TABLET, COATED ORAL at 08:49

## 2021-06-18 RX ADMIN — LORAZEPAM 0.5 MG: 0.5 TABLET ORAL at 08:49

## 2021-06-18 RX ADMIN — POTASSIUM CHLORIDE 20 MEQ: 1500 TABLET, EXTENDED RELEASE ORAL at 13:33

## 2021-06-18 RX ADMIN — PANCRELIPASE 36000 UNITS: 60000; 12000; 38000 CAPSULE, DELAYED RELEASE PELLETS ORAL at 11:21

## 2021-06-18 RX ADMIN — INSULIN LISPRO 2 UNITS: 100 INJECTION, SOLUTION INTRAVENOUS; SUBCUTANEOUS at 06:37

## 2021-06-18 RX ADMIN — METOPROLOL SUCCINATE 25 MG: 25 TABLET, EXTENDED RELEASE ORAL at 08:48

## 2021-06-18 RX ADMIN — PREGABALIN 100 MG: 50 CAPSULE ORAL at 16:10

## 2021-06-18 RX ADMIN — BUSPIRONE HYDROCHLORIDE 7.5 MG: 5 TABLET ORAL at 13:33

## 2021-06-18 RX ADMIN — DICYCLOMINE HYDROCHLORIDE 10 MG: 10 CAPSULE ORAL at 11:21

## 2021-06-18 ASSESSMENT — PAIN SCALES - GENERAL
PAINLEVEL_OUTOF10: 0
PAINLEVEL_OUTOF10: 0

## 2021-06-18 NOTE — PROGRESS NOTES
P/C placed to patient's  regarding Sierra's level of incontinence and decreased ability to ambulate safely. Re-approached regarding rehab stay.  seemed interested. Dr. Reba Martel and MSW made aware.     Electronically signed by Ramo Jones RN on 6/18/2021 at 12:31 PM

## 2021-06-18 NOTE — CARE COORDINATION
Social work / Discharge Planning:        Social work spoke to [facility] and Dr. Karen Vera regarding concern for patient returning home. Social work contacted the patient's  again and discussed patient's weakness and safety concern returning home upon discharge. He is now agreeable for [facility]ANDA. The Plan for Transition of Care is related to the following treatment goals: AMANDA    The Patient and/or patient representative Elvis Limon was provided with a choice of provider and agrees   with the discharge plan. [x] Yes [] No    Freedom of choice list was provided with basic dialogue that supports the patient's individualized plan of care/goals, treatment preferences and shares the quality data associated with the providers. [x] Yes [] No           Referral made to [facility].  requests either [facility] or Wally Teague. Awaiting response. Requested updated PT note.   Electronically signed by MELLO Chang on 6/18/2021 at 12:40 PM

## 2021-06-18 NOTE — PROGRESS NOTES
Physical Therapy    Facility/Department: [facility] SURG  Treatment Note    NAME: Johanna Lo  : 1945  MRN: [id number]               Patient Diagnosis(es): There were no encounter diagnoses. has a past medical history of Arthritis, Depression, Diabetes mellitus ([address] and Hypertension. has a past surgical history that includes Cholecystectomy; Appendectomy; Leg Surgery; Toe Surgery; Foot surgery; Clavicle surgery; and Dental surgery. Evaluating Therapist: Khris De Souza, PT     Equipment needs TBD     Referring Provider:  Dr. Brenda Sinclair     PT order :  PT eval and treat     Room #:  1   DIAGNOSIS:  UTI, fall at home sustained facial lacerations, and CHI   PRECAUTIONS: falls     Social:  Pt lives with  Spouse  in a  1 floor plan set up, 2  steps and  1  rails to enter. Prior to admission pt walked with  No AD. Has cane      Initial Evaluation  Date:  6/15/2021  Treatment Date: 2021      Short Term/ Long Term   Goals   Was pt agreeable to Eval/treatment?  yes  Yes     Does pt have pain?  facial, back, and B knee pain  No c/o pain    Bed Mobility  Rolling: NT   Supine to sit:  SBA   Sit to supine:  NT   Scooting:  SBA in sit  Rolling: SBA   Supine to sit:  SBA   Sit to supine:  SBA   Scooting:  SBA in sit  S/I    Transfers Sit to stand: SBA to  Min assist   Stand to sit: min assist   Stand pivot:  NT  Sit to stand: Min A   Stand to sit: Min A  Stand pivot: Mod A  S/I    Ambulation     15, 70   feet with  ww  with SBA. 20 feet x 1 with no AD CGA  30 feet with WW with Mod A 150  feet with  AAD  with  S/I        Stair negotiation: ascended and descended NT  NT  2  steps with  No  rail with  CGA    LE ROM  WFL      LE strength  WFL      AM- PAC RAW score            Pt is alert and Oriented to self. Pt with moderate confusion throughout treatment. Followed commands, but delayed with functional follow through.       Balance:  Min/Mod A  Endurance:  Decreased   Bed/Chair alarm: yes ASSESSMENT  Pt displays functional ability as noted in the objective portion of this evaluation. Conditions Requiring Skilled Therapeutic Intervention:    [x]Decreased strength     []Decreased ROM  [x]Decreased functional mobility  [x]Decreased balance   [x]Decreased endurance   [x]Decreased posture  []Decreased sensation  []Decreased coordination   []Decreased vision  [x]Decreased safety awareness   [x]Increased pain         Treatment/Education:      Pt educated on fall risk,  Safe and proper technique with all mobility        Patient response to education:   Pt verbalized understanding Pt demonstrated skill Pt requires further education in this area    x  with cues   x       Comments:  Patient found in semi Ron's and agreeable to treatment. Patient stated, \"I have many questions\" but when asked what the questions were patient speaking nonsensical. Patient able to get self to seated EOB and denied dizziness with positional change. Patient assisted to standing and demonstrated moderate right lateral lean that did not improve with gait. Patient demonstrated slow, unsteady gait with scissoring and not following commands for safety. Patient returned to the bedside and abandoned [employer] prior to pivoting to sit. Theodor Mode placed in front of the patient prior to pivot and patient assisted to seated EOB. Patient assisted back to supine and positioned to the [address] [place] [address]. When patient asked if she was comfortable or needed anything, patient started talking about an eye appointment back in April. Patient with confusion, poor safety/judgement and insight to functional limitations. PLAN  Patient is making poor progress toward established physical therapy goals. Continue with current plan of care.         PLAN OF CARE:    Current Treatment Recommendations     [x] Strengthening to improve independence with functional mobility   [] ROM to improve independence with functional mobility   [x] Balance Training to improve static/dynamic balance and to reduce fall risk  [x] Endurance Training to improve activity tolerance during functional mobility   [x] Transfer Training to improve safety and independence with all functional transfers   [x] Gait Training to improve gait mechanics, endurance and assess need for appropriate assistive device  [x] Stair Training in preparation for safe discharge home and/or into the community   [] Positioning to prevent skin breakdown and contractures  [x] Safety and Education Training   [x] Patient/Caregiver Education   [] HEP  [] Other       Time in: 791 002 703  Time out: 1358          CPT codes:  [] Gait training 02602 minutes  [x] Therapeutic activities 83797 15  minutes  [] Therapeutic exercises 80668  minutes  [] Neuromuscular reeducation 73875  minutes       Lenny Patrick PT, DPT  License [id number]

## 2021-06-18 NOTE — CARE COORDINATION
Social work / Discharge Planning:       [facility] is out of network. [place] does not have availability. Referral to [facility]..    Electronically signed by MELLO Blood on 6/18/2021 at 3:57 PM

## 2021-06-18 NOTE — PROGRESS NOTES
Subjective:    Chief complaint:  Denies new complaints  Reports that she is taking in p.o. without issue    Objective:    BP (!) 104/48   Pulse 78   Temp 97.8 °F (36.6 °C) (Oral)   Resp 18   Ht 5' (1.524 m)   Wt 170 lb (77.1 kg)   SpO2 94%   BMI 33.20 kg/m²   General : Awake ,alert,no distress. Heart:  RRR, no murmurs, gallops, or rubs. Lungs:  CTA bilaterally, no wheeze, rales or rhonchi  Abd: bowel sounds present, nontender, nondistended, no masses  Extrem:  No clubbing, cyanosis, or edema  Diffuse bruising noted    CBC:   Lab Results   Component Value Date    WBC 12.3 06/14/2021    RBC 4.82 06/14/2021    HGB 14.7 06/14/2021    HCT 43.9 06/14/2021    MCV 91.1 06/14/2021    MCH 30.5 06/14/2021    MCHC 33.5 06/14/2021    RDW 14.2 06/14/2021     06/14/2021    MPV 9.5 06/14/2021     BMP:    Lab Results   Component Value Date     06/18/2021    K 3.4 06/18/2021    K 3.8 06/14/2021     06/18/2021    CO2 27 06/18/2021    BUN 24 06/18/2021    LABALBU 4.6 06/14/2021    CREATININE 1.2 06/18/2021    CALCIUM 9.5 06/18/2021    GFRAA 53 06/18/2021    LABGLOM 44 06/18/2021    GLUCOSE 199 06/18/2021     PT/INR:    Lab Results   Component Value Date    PROTIME 11.1 06/14/2021    INR 1.0 06/14/2021     Troponin:    Lab Results   Component Value Date    TROPONINI <0.01 07/09/2019       No results for input(s): LABURIN in the last 72 hours. No results for input(s): BC in the last 72 hours. No results for input(s): Lawrnce Diones in the last 72 hours.       Current Facility-Administered Medications:     alogliptin (NESINA) tablet 6.25 mg, 6.25 mg, Oral, Daily, Anirudh Guerrero MD, 6.25 mg at 06/18/21 0848    0.9 % sodium chloride infusion, , Intravenous, Continuous, Dayanna Suárez MD, Last Rate: 75 mL/hr at 06/17/21 1113, New Bag at 06/17/21 1113    perflutren lipid microspheres (DEFINITY) injection 1.65 mg, 1.5 mL, Intravenous, ONCE PRN, YVONNE Bal - CNP    LORazepam (ATIVAN) tablet 0.5 mg, 0.5 mg, Oral, BID, Zach Aranda MD, 0.5 mg at 06/18/21 0849    metFORMIN (GLUCOPHAGE) tablet 500 mg, 500 mg, Oral, BID WC, Zach Aranda MD, 500 mg at 06/18/21 0848    levothyroxine (SYNTHROID) tablet 75 mcg, 75 mcg, Oral, Daily, Zach Aranda MD, 75 mcg at 06/18/21 0631    atorvastatin (LIPITOR) tablet 20 mg, 20 mg, Oral, Daily, Zach Aranda MD, 20 mg at 06/18/21 0849    pregabalin (LYRICA) capsule 100 mg, 100 mg, Oral, 4x Daily, Zach Aranda MD, 100 mg at 06/18/21 0849    vilazodone HCl (VIIBRYD) TABS 40 mg, 40 mg, Oral, Daily, Zach Aranda MD, 40 mg at 06/18/21 0849    pantoprazole (PROTONIX) tablet 40 mg, 40 mg, Oral, QAM AC, Zach Aranda MD, 40 mg at 06/18/21 0631    dicyclomine (BENTYL) capsule 10 mg, 10 mg, Oral, 4x Daily AC & HS, Zach Aranda MD, 10 mg at 06/18/21 1121    lipase-protease-amylase (CREON) delayed release capsule 36,000 Units, 36,000 Units, Oral, TID WC, Zach Aranda MD, 36,000 Units at 06/18/21 1121    metoprolol succinate (TOPROL XL) extended release tablet 25 mg, 25 mg, Oral, Daily, Zach Aranda MD, 25 mg at 06/18/21 0848    aspirin EC tablet 81 mg, 81 mg, Oral, Daily, Zach Aranda MD, 81 mg at 06/18/21 0849    busPIRone (BUSPAR) tablet 7.5 mg, 7.5 mg, Oral, TID, Zach Aranda MD, 7.5 mg at 06/18/21 0849    ARIPiprazole (ABILIFY) tablet 5 mg, 5 mg, Oral, Daily, Zach Aranda MD, 5 mg at 06/18/21 0848    HYDROcodone-acetaminophen (NORCO) 5-325 MG per tablet 1 tablet, 1 tablet, Oral, Q6H PRN, Zach Aranda MD, 1 tablet at 06/16/21 2032    insulin lispro (HUMALOG) injection vial 0-12 Units, 0-12 Units, Subcutaneous, TID WC, Zach Aranda MD, 4 Units at 06/18/21 1122    insulin lispro (HUMALOG) injection vial 0-6 Units, 0-6 Units, Subcutaneous, Nightly, Zach Aranda MD, 2 Units at 06/17/21 2214    glucose (GLUTOSE) 40 % oral gel 15 g, 15 g, Oral, PRN, Dayanna Suárez MD    dextrose 50 % IV solution, 12.5 g, Intravenous, PRN, Dayanna Suárez MD    glucagon (rDNA) injection 1 mg, 1 mg, Intramuscular, PRN, Dayanna Suárez MD    dextrose 5 % solution, 100 mL/hr, Intravenous, PRN, Dayanna Suárez MD    lisinopril (PRINIVIL;ZESTRIL) tablet 20 mg, 20 mg, Oral, Daily, 20 mg at 06/18/21 0849 **AND** hydroCHLOROthiazide (HYDRODIURIL) tablet 25 mg, 25 mg, Oral, Daily, Dayanna Suárez MD, 25 mg at 06/18/21 0849    ADULT DIET; Regular; 4 carb choices (60 gm/meal)    CT LUMBAR SPINE WO CONTRAST   Final Result   Degenerative changes and other chronic appearing findings. MRI would be   useful if symptoms persist.             Assessment:    Active Problems:    UTI (urinary tract infection)  Resolved Problems:    * No resolved hospital problems. *  Diabetes is by history  Questionable syncope patient denies  Hyperlipidemia by history hypothyroidism by history  Chronic pain syndrome  Low back pain by history    Plan:    Replace potassium  Dc AMANDA vs home  I personally feel she needs AMANDA given safety concerns    Dayanna Suárez MD  12:36 PM  6/18/2021    NOTE: This report was transcribed using voice recognition software.  Every effort was made to ensure accuracy; however, inadvertent transcription errors may be present

## 2021-06-18 NOTE — PROGRESS NOTES
Marco Parikh    Your patient is on a medication that requires a renal dose adjustment. Renal Function Assessment:    Date Body Weight IBW Adj. Body Weight Scr CrCl Dialysis status   6/18/21 77.1 kg   1.8 26        Pharmacy has renally dose-adjusted the following medication(s):    Date Medication Original Dosing Regimen New Dosing Regimen   6/18/21 alogliptin 12.5 mg daily 6.25 mg daily           These changes were made per protocol according to the [facility] Renal Function-Based Dose Adjustments Policy    *Please note this dose may need readjusted if your patient's renal function significantly improves. Please contact pharmacy will any questions regarding these changes.     Thank you,

## 2021-06-18 NOTE — CARE COORDINATION
Social work / Discharge Planning:        RN informed social work that patient's  is visiting and is now refusing [facility]ANDA. Social work and [place] met with patient's . He is angry and is stating that he will not \"send her to a nursing home\". Social work again explained rehab and benefits. He is not agreeable at all. He states patient will discharge home. Patient's  is now agreeable for home care and states no preference as long as they accept his insurance. Referral called to [facility] and they are checking benefits. Referral to [facility] cancelled. RN aware. Patient will need home care orders prior to discharge.   Electronically signed by MELLO Kirk on 6/18/2021 at 2:33 PM

## 2021-06-18 NOTE — PROGRESS NOTES
Patient's  is adamant that patient is being d/c home tonight. Advised patient's  that patient is being d/c & [facility]. will call the PCP & them on Monday to arrange visit.

## 2021-06-24 ENCOUNTER — TELEPHONE (OUTPATIENT)
Dept: CARDIOLOGY CLINIC | Age: 76
End: 2021-06-24

## 2021-06-25 NOTE — TELEPHONE ENCOUNTER
Patient notified of Dr. Rose Doran recommendation. Patient put on list to call when September schedule opens.

## 2021-06-29 NOTE — DISCHARGE SUMMARY
Physician Discharge Summary     Patient ID:  Xiomara Mcdonald  [id number]  27 y.o.  1945    Admit date: 6/14/2021    Discharge date and time: 6/18/2021  6:09 PM     Admission Diagnoses: Active Problems:    UTI (urinary tract infection)  Resolved Problems:    * No resolved hospital problems. *      Discharge Diagnoses: Active Problems:    UTI (urinary tract infection)  Resolved Problems:    * No resolved hospital problems. *      Condition at discharge : Stable    Consults: IP CONSULT TO SPIRITUAL SERVICES  IP CONSULT TO SOCIAL WORK  IP CONSULT TO CARDIOLOGY  IP CONSULT TO HOME CARE NEEDS    Procedures: None    Hospital Course: Patient is a 81-year-old lady who presented to the emergency room with chief complaint of multiple falls. Patient apparently also had syncope. Patient reported she has been falling for the last 2 months. She denied any urinary symptoms. In the emergency room patient was noted to have possible urinary tract infection. There were multiple bruises on the face. She was noted to have rib fractures which were felt to be old. She was then admitted for further evaluation and treatment. She recently had Botox injection for urinary incontinence. She also had bilateral lower extremity weakness and clumsiness. Cardiology was asked to see regarding syncope. CAT scan of the lumbosacral spine was obtained. Procalcitonin was low. Antibiotics were discontinued. Subacute was recommended on discharge. Her ampac scores were good. Because of safety concerns subacute rehab was still suggested. Patient and patient's  declined this option and requested discharge home with home care. CT LUMBAR SPINE WO CONTRAST   Final Result   Degenerative changes and other chronic appearing findings.   MRI would be   useful if symptoms persist.             Results for orders placed or performed during the hospital encounter of 06/14/21 (from the past 336 hour(s))   Basic Metabolic Panel    Collection Time: Result Value Ref Range    Meter Glucose 207 (H) 74 - 99 mg/dL   POCT Glucose    Collection Time: 06/17/21  6:02 AM   Result Value Ref Range    Meter Glucose 122 (H) 74 - 99 mg/dL   Basic Metabolic Panel    Collection Time: 06/17/21  9:12 AM   Result Value Ref Range    Sodium 141 132 - 146 mmol/L    Potassium 3.9 3.5 - 5.0 mmol/L    Chloride 103 98 - 107 mmol/L    CO2 27 22 - 29 mmol/L    Anion Gap 11 7 - 16 mmol/L    Glucose 252 (H) 74 - 99 mg/dL    BUN 30 (H) 6 - 23 mg/dL    CREATININE 1.8 (H) 0.5 - 1.0 mg/dL    GFR Non-African American 28 >=60 mL/min/1.73    GFR African American 33     Calcium 9.5 8.6 - 10.2 mg/dL   POCT Glucose    Collection Time: 06/17/21 11:15 AM   Result Value Ref Range    Meter Glucose 195 (H) 74 - 99 mg/dL   POCT Glucose    Collection Time: 06/17/21  4:16 PM   Result Value Ref Range    Meter Glucose 227 (H) 74 - 99 mg/dL   POCT Glucose    Collection Time: 06/17/21  9:36 PM   Result Value Ref Range    Meter Glucose 245 (H) 74 - 99 mg/dL   POCT Glucose    Collection Time: 06/18/21  6:37 AM   Result Value Ref Range    Meter Glucose 160 (H) 74 - 99 mg/dL   Basic Metabolic Panel    Collection Time: 06/18/21  8:08 AM   Result Value Ref Range    Sodium 142 132 - 146 mmol/L    Potassium 3.4 (L) 3.5 - 5.0 mmol/L    Chloride 105 98 - 107 mmol/L    CO2 27 22 - 29 mmol/L    Anion Gap 10 7 - 16 mmol/L    Glucose 199 (H) 74 - 99 mg/dL    BUN 24 (H) 6 - 23 mg/dL    CREATININE 1.2 (H) 0.5 - 1.0 mg/dL    GFR Non-African American 44 >=60 mL/min/1.73    GFR African American 53     Calcium 9.5 8.6 - 10.2 mg/dL   POCT Glucose    Collection Time: 06/18/21 11:20 AM   Result Value Ref Range    Meter Glucose 209 (H) 74 - 99 mg/dL   POCT Glucose    Collection Time: 06/18/21  4:09 PM   Result Value Ref Range    Meter Glucose 151 (H) 74 - 99 mg/dL         Discharge Exam:  See progress note from today    Disposition: Home with home care    Patient Instructions:   Discharge Medication List as of 6/18/2021  5:41 PM

## 2021-07-14 PROBLEM — N39.0 UTI (URINARY TRACT INFECTION): Status: RESOLVED | Noted: 2021-06-14 | Resolved: 2021-07-14

## 2021-08-10 ENCOUNTER — TELEPHONE (OUTPATIENT)
Dept: ADMINISTRATIVE | Age: 76
End: 2021-08-10

## 2021-08-10 ENCOUNTER — TELEPHONE (OUTPATIENT)
Dept: CARDIOLOGY CLINIC | Age: 76
End: 2021-08-10

## 2021-10-13 ENCOUNTER — OFFICE VISIT (OUTPATIENT)
Dept: CARDIOLOGY CLINIC | Age: 76
End: 2021-10-13
Payer: MEDICARE

## 2021-10-13 VITALS
DIASTOLIC BLOOD PRESSURE: 70 MMHG | RESPIRATION RATE: 16 BRPM | SYSTOLIC BLOOD PRESSURE: 118 MMHG | BODY MASS INDEX: 33.31 KG/M2 | WEIGHT: 169.7 LBS | HEIGHT: 60 IN | HEART RATE: 65 BPM

## 2021-10-13 DIAGNOSIS — I47.1 ATRIAL PAROXYSMAL TACHYCARDIA (HCC): ICD-10-CM

## 2021-10-13 DIAGNOSIS — Z95.1 HX OF CABG: ICD-10-CM

## 2021-10-13 DIAGNOSIS — R77.8 ELEVATED TROPONIN: ICD-10-CM

## 2021-10-13 DIAGNOSIS — I10 PRIMARY HYPERTENSION: ICD-10-CM

## 2021-10-13 DIAGNOSIS — I25.10 CORONARY ARTERY DISEASE INVOLVING NATIVE CORONARY ARTERY OF NATIVE HEART WITHOUT ANGINA PECTORIS: ICD-10-CM

## 2021-10-13 DIAGNOSIS — R55 SYNCOPE AND COLLAPSE: Primary | ICD-10-CM

## 2021-10-13 PROCEDURE — 4040F PNEUMOC VAC/ADMIN/RCVD: CPT | Performed by: INTERNAL MEDICINE

## 2021-10-13 PROCEDURE — 93000 ELECTROCARDIOGRAM COMPLETE: CPT | Performed by: INTERNAL MEDICINE

## 2021-10-13 PROCEDURE — G8400 PT W/DXA NO RESULTS DOC: HCPCS | Performed by: INTERNAL MEDICINE

## 2021-10-13 PROCEDURE — 4004F PT TOBACCO SCREEN RCVD TLK: CPT | Performed by: INTERNAL MEDICINE

## 2021-10-13 PROCEDURE — 1090F PRES/ABSN URINE INCON ASSESS: CPT | Performed by: INTERNAL MEDICINE

## 2021-10-13 PROCEDURE — 1123F ACP DISCUSS/DSCN MKR DOCD: CPT | Performed by: INTERNAL MEDICINE

## 2021-10-13 PROCEDURE — G8484 FLU IMMUNIZE NO ADMIN: HCPCS | Performed by: INTERNAL MEDICINE

## 2021-10-13 PROCEDURE — 3017F COLORECTAL CA SCREEN DOC REV: CPT | Performed by: INTERNAL MEDICINE

## 2021-10-13 PROCEDURE — 99213 OFFICE O/P EST LOW 20 MIN: CPT | Performed by: INTERNAL MEDICINE

## 2021-10-13 PROCEDURE — G8417 CALC BMI ABV UP PARAM F/U: HCPCS | Performed by: INTERNAL MEDICINE

## 2021-10-13 PROCEDURE — G8427 DOCREV CUR MEDS BY ELIG CLIN: HCPCS | Performed by: INTERNAL MEDICINE

## 2021-10-13 RX ORDER — DICYCLOMINE HYDROCHLORIDE 10 MG/5ML
SOLUTION ORAL
COMMUNITY
End: 2021-10-13

## 2021-10-13 RX ORDER — BUSPIRONE HYDROCHLORIDE 15 MG/1
TABLET ORAL
COMMUNITY
End: 2021-10-13

## 2021-10-13 RX ORDER — ATORVASTATIN CALCIUM 10 MG/1
TABLET, FILM COATED ORAL
COMMUNITY
Start: 2021-08-19 | End: 2021-10-13

## 2021-10-13 RX ORDER — METAXALONE 800 MG/1
TABLET ORAL
Status: ON HOLD | COMMUNITY
End: 2021-12-20 | Stop reason: HOSPADM

## 2021-10-13 NOTE — PROGRESS NOTES
OUTPATIENT CARDIOLOGY FOLLOW-UP    Name: Isacc Hatch    Age: 78 y.o. Date of Service: 10/13/2021    Chief Complaint: Follow-up for elevated troponin, syncope    Interim History:  No recent chest pain, respiratory distress, palpitations, orthopnea, or syncope. SR on EKG and telemetry. No new cardiac complaints. Review of Systems:   Cardiac: As per HPI  General: No fever, chills  Pulmonary: As per HPI  HEENT: No visual disturbances, difficult swallowing  GI: No nausea, vomiting  : No dysuria, hematuria  Endocrine: +hypothyroidism, +DM  Musculoskeletal: MAYO x 4, no focal motor deficits  Skin: Intact, no rashes  Neuro: No headache, seizures  Psych: Currently with no depression, anxiety    Problem List:  Patient Active Problem List   Diagnosis    Subcutaneous mass    Hypotension    Diabetes mellitus ([address]    Syncope and collapse    Severe protein-calorie malnutrition (HCC)       Allergies:  No Known Allergies    Current Medications:  Current Outpatient Medications   Medication Sig Dispense Refill    pregabalin (LYRICA) 100 MG capsule Take 100 mg by mouth 4 times daily.  vilazodone HCl (VILAZODONE HCL) 40 MG TABS Take 40 mg by mouth daily      atorvastatin (LIPITOR) 20 MG tablet Take 20 mg by mouth daily      levothyroxine (SYNTHROID) 75 MCG tablet Take 75 mcg by mouth Daily      LORazepam (ATIVAN) 0.5 MG tablet Take 0.5 mg by mouth 2 times daily.  Multiple Vitamins-Minerals (PRESERVISION AREDS 2) CAPS Take 1 capsule by mouth 2 times daily      raNITIdine (ZANTAC) 300 MG tablet Take 300 mg by mouth nightly      baclofen (LIORESAL) 20 MG tablet Take 20 mg by mouth nightly      loratadine (CLARITIN) 10 MG tablet Take 10 mg by mouth daily      HYDROcodone-acetaminophen (NORCO) 5-325 MG per tablet Take 1 tablet by mouth three times daily.        dicyclomine (BENTYL) 10 MG capsule Take 10 mg by mouth 4 times daily (before meals and nightly)      lipase-protease-amylase (CREON) 95932 units delayed release capsule Take 36,000 Units by mouth 3 times daily (with meals)       metoprolol succinate (TOPROL XL) 25 MG extended release tablet Take 25 mg by mouth daily      aspirin 81 MG tablet Take 81 mg by mouth daily      busPIRone (BUSPAR) 15 MG tablet Take 7.5 mg by mouth 3 times daily      ARIPiprazole (ABILIFY) 5 MG tablet Take 5 mg by mouth daily      alendronate (FOSAMAX) 70 MG tablet Take 70 mg by mouth every 7 days       lisinopril-hydrochlorothiazide (PRINZIDE;ZESTORETIC) 20-25 MG per tablet Take 1 tablet by mouth daily.  metaxalone (SKELAXIN) 800 MG tablet 1 tablet      alogliptin (NESINA) 6.25 MG TABS tablet Take 1 tablet by mouth daily (Patient not taking: Reported on 10/13/2021) 120 tablet 2    metFORMIN (GLUCOPHAGE) 500 MG tablet Take 1 tablet by mouth 2 times daily (with meals) (Patient not taking: Reported on 10/13/2021) 60 tablet 3    omeprazole (PRILOSEC) 20 MG capsule Take 40 mg by mouth Daily  (Patient not taking: Reported on 10/13/2021)       No current facility-administered medications for this visit. Physical Exam:  /70   Pulse 65   Resp 16   Ht 5' (1.524 m)   Wt 169 lb 11.2 oz (77 kg)   BMI 33.14 kg/m²   Wt Readings from Last 3 Encounters:   10/13/21 169 lb 11.2 oz (77 kg)   06/15/21 170 lb (77.1 kg)   06/14/21 170 lb (77.1 kg)     Appearance: Awake, alert, no acute respiratory distress  Skin: Intact, no rash  Head: Normocephalic, atraumatic  Eyes: EOMI, no conjunctival erythema  ENMT: No pharyngeal erythema, MMM, no rhinorrhea  Neck: Supple, no elevated JVP, no carotid bruits  Lungs: Clear to auscultation bilaterally. No wheezes, rales, or rhonchi.   Cardiac: Regular rate and rhythm, +S1S2, no murmurs apparent  Abdomen: Soft, nontender, +bowel sounds  Extremities: Moves all extremities x 4, no lower extremity edema  Neurologic: No focal motor deficits apparent, normal mood and affect    Intake/Output:  No intake or output data in the 24 hours ending 10/13/21 1344  No intake/output data recorded. Laboratory Tests:  Lab Results   Component Value Date    CREATININE 1.2 (H) 06/18/2021    BUN 24 (H) 06/18/2021     06/18/2021    K 3.4 (L) 06/18/2021     06/18/2021    CO2 27 06/18/2021     Lab Results   Component Value Date    MG 2.0 08/20/2015     Lab Results   Component Value Date    ALT 14 06/14/2021    AST 20 06/14/2021    ALKPHOS 80 06/14/2021    BILITOT 0.5 06/14/2021     Lab Results   Component Value Date    WBC 12.3 (H) 06/14/2021    HGB 14.7 06/14/2021    HCT 43.9 06/14/2021    MCV 91.1 06/14/2021     06/14/2021     Lab Results   Component Value Date    CKTOTAL 177 06/14/2021    CKMB 3.8 08/19/2015    TROPONINI <0.01 07/09/2019    TROPONINI <0.01 02/25/2017    TROPONINI 0.01 08/19/2015     Lab Results   Component Value Date    INR 1.0 06/14/2021    INR 1.2 07/09/2019    INR 1.0 08/19/2015    PROTIME 11.1 06/14/2021    PROTIME 14.1 (H) 07/09/2019    PROTIME 11.2 08/19/2015     Lab Results   Component Value Date    TSH 1.480 06/15/2021     Lab Results   Component Value Date    LABA1C 5.8 02/07/2018     No results found for: EAG  Lab Results   Component Value Date    CHOL 164 11/13/2015     Lab Results   Component Value Date    TRIG 52 11/13/2015     Lab Results   Component Value Date    HDL 77 11/13/2015     Lab Results   Component Value Date    LDLCALC 77 11/13/2015     Lab Results   Component Value Date    LABVLDL 10 11/13/2015     No results found for: CHOLHDLRATIO  No results for input(s): PROBNP in the last 72 hours. Cardiac Tests:  EKG reviewed (EKG date: 10/13/21): SR, rate 65, NSSTT changes    EKG reviewed (EKG date: 6/14/21): SR, rate 63, NSSTT changes    Echocardiogram: 6/15/21 (Dr. Divina Martínez)   Normal left ventricular systolic function. Ejection fraction is visually estimated at 60%. Normal right ventricular size and function. There is doppler evidence of stage I diastolic dysfunction. Mild tricuspid regurgitation. PASP is estimated at 42 mmHg. ASSESSMENT / PLAN:  78 y.o. female admitted with mechanical fall (6/14/2021) / history of recurrent falls. CT chest showing multiple chronic right sided rib fractures and chronic right clavicle fracture. CT head unremarkable. +Laceration (3 cm) to chin. Orthostatic BP negative. ? Elevated hs-cTnT: pattern not consistent with ACS (60, 54, 47) with no rise in total CK. No acute STT changes. ? Hx of CABG x 1 ([facility]AD) 10/2016 ([facility])  ? Brief run of PAT  ? Remote tobacco abuse: Quit in 1/2021 (0.5-1 PPD x 55 years)  ? Hypertension: Controlled. BP today 118/70.  ? Hyperlipidemia: On statin therapy  ? Hypothyroidism: On replacement therapy  ? T2DM  ? Chronic low back pain /with injection  ? Urinary hesitancy with history of recent bladder Botox  ?  Tremor on clinical exam     - Patient denies syncope / +history of falls  - Echocardiogram results reviewed with the patient today  - HCTZ stopped during 6/2021 admission  - Consider noncardiac medications contributing to symptoms (including Abilify, Buspar, Bentyl)  - Maintain adequate hydration   - Discussed option of cardiac monitoring pending clinical course  - Aggressive risk factor modification  - Prior cardiology records reviewed    Chencho Mendoza MD  [facility]) Cardiology

## 2021-12-13 ENCOUNTER — APPOINTMENT (OUTPATIENT)
Dept: CT IMAGING | Age: 76
DRG: 637 | End: 2021-12-13
Payer: MEDICARE

## 2021-12-13 ENCOUNTER — HOSPITAL ENCOUNTER (INPATIENT)
Age: 76
LOS: 7 days | Discharge: NURSING, REHAB OR LONG-TERM CARE | DRG: 637 | End: 2021-12-20
Attending: STUDENT IN AN ORGANIZED HEALTH CARE EDUCATION/TRAINING PROGRAM | Admitting: INTERNAL MEDICINE
Payer: MEDICARE

## 2021-12-13 ENCOUNTER — APPOINTMENT (OUTPATIENT)
Dept: GENERAL RADIOLOGY | Age: 76
DRG: 637 | End: 2021-12-13
Payer: MEDICARE

## 2021-12-13 DIAGNOSIS — T79.6XXA TRAUMATIC RHABDOMYOLYSIS, INITIAL ENCOUNTER (HCC): ICD-10-CM

## 2021-12-13 DIAGNOSIS — N18.9 CHRONIC KIDNEY DISEASE, UNSPECIFIED CKD STAGE: ICD-10-CM

## 2021-12-13 DIAGNOSIS — I21.4 NSTEMI (NON-ST ELEVATED MYOCARDIAL INFARCTION) (HCC): Primary | ICD-10-CM

## 2021-12-13 DIAGNOSIS — E87.20 LACTIC ACIDOSIS: ICD-10-CM

## 2021-12-13 DIAGNOSIS — J96.01 ACUTE RESPIRATORY FAILURE WITH HYPOXIA (HCC): ICD-10-CM

## 2021-12-13 DIAGNOSIS — E11.10 DIABETIC KETOACIDOSIS WITHOUT COMA ASSOCIATED WITH TYPE 2 DIABETES MELLITUS (HCC): ICD-10-CM

## 2021-12-13 DIAGNOSIS — I50.43 ACUTE ON CHRONIC COMBINED SYSTOLIC AND DIASTOLIC CHF (CONGESTIVE HEART FAILURE) (HCC): ICD-10-CM

## 2021-12-13 DIAGNOSIS — I45.10 NEW ONSET RIGHT BUNDLE BRANCH BLOCK (RBBB): ICD-10-CM

## 2021-12-13 LAB
ALBUMIN SERPL-MCNC: 3.9 G/DL (ref 3.5–5.2)
ALBUMIN SERPL-MCNC: 4.1 G/DL (ref 3.5–5.2)
ALP BLD-CCNC: 100 U/L (ref 35–104)
ALP BLD-CCNC: 113 U/L (ref 35–104)
ALT SERPL-CCNC: 45 U/L (ref 0–32)
ALT SERPL-CCNC: 52 U/L (ref 0–32)
ANION GAP SERPL CALCULATED.3IONS-SCNC: 20 MMOL/L (ref 7–16)
ANION GAP SERPL CALCULATED.3IONS-SCNC: 24 MMOL/L (ref 7–16)
APTT: 25.5 SEC (ref 24.5–35.1)
AST SERPL-CCNC: 84 U/L (ref 0–31)
AST SERPL-CCNC: 94 U/L (ref 0–31)
BACTERIA: ABNORMAL /HPF
BASOPHILS ABSOLUTE: 0.04 E9/L (ref 0–0.2)
BASOPHILS RELATIVE PERCENT: 0.3 % (ref 0–2)
BETA-HYDROXYBUTYRATE: 1.98 MMOL/L (ref 0.02–0.27)
BILIRUB SERPL-MCNC: 0.3 MG/DL (ref 0–1.2)
BILIRUB SERPL-MCNC: 0.4 MG/DL (ref 0–1.2)
BILIRUBIN URINE: ABNORMAL
BLOOD, URINE: ABNORMAL
BUN BLDV-MCNC: 48 MG/DL (ref 6–23)
BUN BLDV-MCNC: 49 MG/DL (ref 6–23)
CALCIUM SERPL-MCNC: 9 MG/DL (ref 8.6–10.2)
CALCIUM SERPL-MCNC: 9.7 MG/DL (ref 8.6–10.2)
CHLORIDE BLD-SCNC: 98 MMOL/L (ref 98–107)
CHLORIDE BLD-SCNC: 98 MMOL/L (ref 98–107)
CHP ED QC CHECK: NORMAL
CLARITY: ABNORMAL
CO2: 14 MMOL/L (ref 22–29)
CO2: 17 MMOL/L (ref 22–29)
COLOR: YELLOW
CREAT SERPL-MCNC: 1.8 MG/DL (ref 0.5–1)
CREAT SERPL-MCNC: 1.8 MG/DL (ref 0.5–1)
EOSINOPHILS ABSOLUTE: 0.02 E9/L (ref 0.05–0.5)
EOSINOPHILS RELATIVE PERCENT: 0.1 % (ref 0–6)
GFR AFRICAN AMERICAN: 33
GFR AFRICAN AMERICAN: 33
GFR NON-AFRICAN AMERICAN: 27 ML/MIN/1.73
GFR NON-AFRICAN AMERICAN: 27 ML/MIN/1.73
GLUCOSE BLD-MCNC: 204 MG/DL
GLUCOSE BLD-MCNC: 244 MG/DL
GLUCOSE BLD-MCNC: 250 MG/DL (ref 74–99)
GLUCOSE BLD-MCNC: 288 MG/DL
GLUCOSE BLD-MCNC: 326 MG/DL (ref 74–99)
GLUCOSE URINE: NEGATIVE MG/DL
HCT VFR BLD CALC: 36.8 % (ref 34–48)
HCT VFR BLD CALC: 39.2 % (ref 34–48)
HEMOGLOBIN: 11.9 G/DL (ref 11.5–15.5)
HEMOGLOBIN: 12.6 G/DL (ref 11.5–15.5)
IMMATURE GRANULOCYTES #: 0.42 E9/L
IMMATURE GRANULOCYTES %: 2.9 % (ref 0–5)
KETONES, URINE: ABNORMAL MG/DL
LACTIC ACID: 3.8 MMOL/L (ref 0.5–2.2)
LEUKOCYTE ESTERASE, URINE: ABNORMAL
LYMPHOCYTES ABSOLUTE: 1.66 E9/L (ref 1.5–4)
LYMPHOCYTES RELATIVE PERCENT: 11.6 % (ref 20–42)
MCH RBC QN AUTO: 31.2 PG (ref 26–35)
MCH RBC QN AUTO: 31.3 PG (ref 26–35)
MCHC RBC AUTO-ENTMCNC: 32.1 % (ref 32–34.5)
MCHC RBC AUTO-ENTMCNC: 32.3 % (ref 32–34.5)
MCV RBC AUTO: 96.3 FL (ref 80–99.9)
MCV RBC AUTO: 97.3 FL (ref 80–99.9)
METER GLUCOSE: 204 MG/DL (ref 74–99)
METER GLUCOSE: 244 MG/DL (ref 74–99)
METER GLUCOSE: 288 MG/DL (ref 74–99)
MONOCYTES ABSOLUTE: 0.78 E9/L (ref 0.1–0.95)
MONOCYTES RELATIVE PERCENT: 5.4 % (ref 2–12)
NEUTROPHILS ABSOLUTE: 11.45 E9/L (ref 1.8–7.3)
NEUTROPHILS RELATIVE PERCENT: 79.7 % (ref 43–80)
NITRITE, URINE: NEGATIVE
PDW BLD-RTO: 14.6 FL (ref 11.5–15)
PDW BLD-RTO: 14.6 FL (ref 11.5–15)
PH UA: 5 (ref 5–9)
PH VENOUS: 7.29 (ref 7.35–7.45)
PLATELET # BLD: 196 E9/L (ref 130–450)
PLATELET # BLD: 215 E9/L (ref 130–450)
PMV BLD AUTO: 10.1 FL (ref 7–12)
PMV BLD AUTO: 10.4 FL (ref 7–12)
POTASSIUM REFLEX MAGNESIUM: 4.3 MMOL/L (ref 3.5–5)
POTASSIUM REFLEX MAGNESIUM: 5 MMOL/L (ref 3.5–5)
PRO-BNP: ABNORMAL PG/ML (ref 0–450)
PROTEIN UA: 30 MG/DL
RBC # BLD: 3.82 E12/L (ref 3.5–5.5)
RBC # BLD: 4.03 E12/L (ref 3.5–5.5)
RBC UA: ABNORMAL /HPF (ref 0–2)
SARS-COV-2, NAAT: NOT DETECTED
SODIUM BLD-SCNC: 135 MMOL/L (ref 132–146)
SODIUM BLD-SCNC: 136 MMOL/L (ref 132–146)
SPECIFIC GRAVITY UA: 1.02 (ref 1–1.03)
TOTAL CK: 1547 U/L (ref 20–180)
TOTAL PROTEIN: 6.4 G/DL (ref 6.4–8.3)
TOTAL PROTEIN: 6.9 G/DL (ref 6.4–8.3)
TROPONIN, HIGH SENSITIVITY: 1215 NG/L (ref 0–9)
TROPONIN, HIGH SENSITIVITY: 964 NG/L (ref 0–9)
UROBILINOGEN, URINE: 0.2 E.U./DL
WBC # BLD: 11.5 E9/L (ref 4.5–11.5)
WBC # BLD: 14.4 E9/L (ref 4.5–11.5)
WBC UA: ABNORMAL /HPF (ref 0–5)

## 2021-12-13 PROCEDURE — 6360000002 HC RX W HCPCS: Performed by: STUDENT IN AN ORGANIZED HEALTH CARE EDUCATION/TRAINING PROGRAM

## 2021-12-13 PROCEDURE — 2580000003 HC RX 258: Performed by: STUDENT IN AN ORGANIZED HEALTH CARE EDUCATION/TRAINING PROGRAM

## 2021-12-13 PROCEDURE — 85730 THROMBOPLASTIN TIME PARTIAL: CPT

## 2021-12-13 PROCEDURE — 2000000000 HC ICU R&B

## 2021-12-13 PROCEDURE — 80053 COMPREHEN METABOLIC PANEL: CPT

## 2021-12-13 PROCEDURE — 87040 BLOOD CULTURE FOR BACTERIA: CPT

## 2021-12-13 PROCEDURE — 87088 URINE BACTERIA CULTURE: CPT

## 2021-12-13 PROCEDURE — 99285 EMERGENCY DEPT VISIT HI MDM: CPT

## 2021-12-13 PROCEDURE — 82800 BLOOD PH: CPT

## 2021-12-13 PROCEDURE — 70450 CT HEAD/BRAIN W/O DYE: CPT

## 2021-12-13 PROCEDURE — 93005 ELECTROCARDIOGRAM TRACING: CPT | Performed by: STUDENT IN AN ORGANIZED HEALTH CARE EDUCATION/TRAINING PROGRAM

## 2021-12-13 PROCEDURE — 71275 CT ANGIOGRAPHY CHEST: CPT

## 2021-12-13 PROCEDURE — 36415 COLL VENOUS BLD VENIPUNCTURE: CPT

## 2021-12-13 PROCEDURE — 71045 X-RAY EXAM CHEST 1 VIEW: CPT

## 2021-12-13 PROCEDURE — 74176 CT ABD & PELVIS W/O CONTRAST: CPT

## 2021-12-13 PROCEDURE — 87186 SC STD MICRODIL/AGAR DIL: CPT

## 2021-12-13 PROCEDURE — 81001 URINALYSIS AUTO W/SCOPE: CPT

## 2021-12-13 PROCEDURE — 6360000004 HC RX CONTRAST MEDICATION: Performed by: RADIOLOGY

## 2021-12-13 PROCEDURE — 2580000003 HC RX 258

## 2021-12-13 PROCEDURE — P9612 CATHETERIZE FOR URINE SPEC: HCPCS

## 2021-12-13 PROCEDURE — 72170 X-RAY EXAM OF PELVIS: CPT

## 2021-12-13 PROCEDURE — 72125 CT NECK SPINE W/O DYE: CPT

## 2021-12-13 PROCEDURE — 96361 HYDRATE IV INFUSION ADD-ON: CPT

## 2021-12-13 PROCEDURE — 82550 ASSAY OF CK (CPK): CPT

## 2021-12-13 PROCEDURE — 96360 HYDRATION IV INFUSION INIT: CPT

## 2021-12-13 PROCEDURE — 82962 GLUCOSE BLOOD TEST: CPT

## 2021-12-13 PROCEDURE — 82010 KETONE BODYS QUAN: CPT

## 2021-12-13 PROCEDURE — 83880 ASSAY OF NATRIURETIC PEPTIDE: CPT

## 2021-12-13 PROCEDURE — 6370000000 HC RX 637 (ALT 250 FOR IP): Performed by: STUDENT IN AN ORGANIZED HEALTH CARE EDUCATION/TRAINING PROGRAM

## 2021-12-13 PROCEDURE — 84484 ASSAY OF TROPONIN QUANT: CPT

## 2021-12-13 PROCEDURE — 2500000003 HC RX 250 WO HCPCS: Performed by: STUDENT IN AN ORGANIZED HEALTH CARE EDUCATION/TRAINING PROGRAM

## 2021-12-13 PROCEDURE — 85025 COMPLETE CBC W/AUTO DIFF WBC: CPT

## 2021-12-13 PROCEDURE — 85027 COMPLETE CBC AUTOMATED: CPT

## 2021-12-13 PROCEDURE — 83605 ASSAY OF LACTIC ACID: CPT

## 2021-12-13 PROCEDURE — 87635 SARS-COV-2 COVID-19 AMP PRB: CPT

## 2021-12-13 RX ORDER — MAGNESIUM SULFATE 1 G/100ML
1000 INJECTION INTRAVENOUS PRN
Status: DISCONTINUED | OUTPATIENT
Start: 2021-12-13 | End: 2021-12-14

## 2021-12-13 RX ORDER — HEPARIN SODIUM 1000 [USP'U]/ML
4000 INJECTION, SOLUTION INTRAVENOUS; SUBCUTANEOUS ONCE
Status: COMPLETED | OUTPATIENT
Start: 2021-12-13 | End: 2021-12-13

## 2021-12-13 RX ORDER — 0.9 % SODIUM CHLORIDE 0.9 %
1000 INTRAVENOUS SOLUTION INTRAVENOUS ONCE
Status: COMPLETED | OUTPATIENT
Start: 2021-12-13 | End: 2021-12-13

## 2021-12-13 RX ORDER — HEPARIN SODIUM 1000 [USP'U]/ML
4000 INJECTION, SOLUTION INTRAVENOUS; SUBCUTANEOUS PRN
Status: DISCONTINUED | OUTPATIENT
Start: 2021-12-13 | End: 2021-12-16

## 2021-12-13 RX ORDER — SODIUM CHLORIDE 450 MG/100ML
INJECTION, SOLUTION INTRAVENOUS CONTINUOUS
Status: DISCONTINUED | OUTPATIENT
Start: 2021-12-13 | End: 2021-12-16

## 2021-12-13 RX ORDER — DEXTROSE, SODIUM CHLORIDE, AND POTASSIUM CHLORIDE 5; .45; .15 G/100ML; G/100ML; G/100ML
INJECTION INTRAVENOUS CONTINUOUS PRN
Status: DISCONTINUED | OUTPATIENT
Start: 2021-12-13 | End: 2021-12-14

## 2021-12-13 RX ORDER — DEXTROSE MONOHYDRATE 25 G/50ML
12.5 INJECTION, SOLUTION INTRAVENOUS PRN
Status: DISCONTINUED | OUTPATIENT
Start: 2021-12-13 | End: 2021-12-20 | Stop reason: HOSPADM

## 2021-12-13 RX ORDER — HEPARIN SODIUM 1000 [USP'U]/ML
2000 INJECTION, SOLUTION INTRAVENOUS; SUBCUTANEOUS PRN
Status: DISCONTINUED | OUTPATIENT
Start: 2021-12-13 | End: 2021-12-16

## 2021-12-13 RX ORDER — CEFTRIAXONE 1 G/1
INJECTION, POWDER, FOR SOLUTION INTRAMUSCULAR; INTRAVENOUS
Status: DISPENSED
Start: 2021-12-13 | End: 2021-12-14

## 2021-12-13 RX ORDER — SODIUM CHLORIDE 0.9 % (FLUSH) 0.9 %
SYRINGE (ML) INJECTION
Status: COMPLETED
Start: 2021-12-13 | End: 2021-12-13

## 2021-12-13 RX ORDER — HEPARIN SODIUM 10000 [USP'U]/100ML
5-30 INJECTION, SOLUTION INTRAVENOUS CONTINUOUS
Status: DISCONTINUED | OUTPATIENT
Start: 2021-12-13 | End: 2021-12-16

## 2021-12-13 RX ORDER — POTASSIUM CHLORIDE 7.45 MG/ML
10 INJECTION INTRAVENOUS PRN
Status: DISCONTINUED | OUTPATIENT
Start: 2021-12-13 | End: 2021-12-14

## 2021-12-13 RX ADMIN — SODIUM CHLORIDE, PRESERVATIVE FREE: 5 INJECTION INTRAVENOUS at 22:37

## 2021-12-13 RX ADMIN — SODIUM CHLORIDE 5.76 UNITS/HR: 9 INJECTION, SOLUTION INTRAVENOUS at 23:34

## 2021-12-13 RX ADMIN — CEFTRIAXONE 1000 MG: 1 INJECTION, POWDER, FOR SOLUTION INTRAMUSCULAR; INTRAVENOUS at 21:17

## 2021-12-13 RX ADMIN — ASPIRIN 325 MG: 325 TABLET, COATED ORAL at 21:17

## 2021-12-13 RX ADMIN — HEPARIN SODIUM 4000 UNITS: 1000 INJECTION INTRAVENOUS; SUBCUTANEOUS at 21:23

## 2021-12-13 RX ADMIN — SODIUM CHLORIDE 1000 ML: 9 INJECTION, SOLUTION INTRAVENOUS at 16:36

## 2021-12-13 RX ADMIN — HEPARIN SODIUM AND DEXTROSE 12 UNITS/KG/HR: 10000; 5 INJECTION INTRAVENOUS at 21:21

## 2021-12-13 RX ADMIN — IOPAMIDOL 75 ML: 755 INJECTION, SOLUTION INTRAVENOUS at 18:21

## 2021-12-13 RX ADMIN — POTASSIUM CHLORIDE, DEXTROSE MONOHYDRATE AND SODIUM CHLORIDE: 150; 5; 450 INJECTION, SOLUTION INTRAVENOUS at 22:33

## 2021-12-13 RX ADMIN — SODIUM CHLORIDE 7.94 UNITS/HR: 9 INJECTION, SOLUTION INTRAVENOUS at 22:24

## 2021-12-13 NOTE — ED PROVIDER NOTES
Department of Emergency Medicine   ED  Provider Note  Admit Date/RoomTime: 12/13/2021  3:18 PM  ED Room: 17/17          History of Present Illness:  12/13/21, Time: 3:33 PM EST  Chief Complaint   Patient presents with    Shortness of Breath     hypoxic on room air, weakness, fatigue, pt states that she fell this afternoon, + head injury, - loc, -thinners, from home    Rafia Restrepo is a 78 y.o. female presenting to the ED for fall, weakness, and shortness of breath, beginning several days ago. The complaint has been persistent, moderate in severity, and worsened by nothing. The patient is a 82-year-old female with a history of type 2 diabetes who presents to the emergency department via EMS from home for shortness of breath, fatigue, and fall. Patient symptoms were sudden onset earlier today, has been persistent, moderate severity, nothing makes it better or worse. The patient states that she felt weak and fatigued earlier today. She states that she felt so weak that her leg just gave out and she fell earlier this afternoon hit her head. She is not on any anticoagulation besides ASA. She did not experience any loss of consciousness. She is now complaining of shortness of breath and chest pain. The patient received the Covid vaccine but not the booster. She denies any fever, chills, cough, congestion, numbness, tingling, unilateral weakness, nausea, vomiting, diarrhea, abdominal pain, recent hospitalization, recent illness, or other acute symptoms or concerns. Review of Systems:   A complete review of systems was performed and pertinent positives and negatives are stated within HPI, all other systems reviewed and are negative.        --------------------------------------------- PAST HISTORY ---------------------------------------------  Past Medical History:  has a past medical history of Arthritis, Depression, Diabetes mellitus ([facility] [address] and Hypertension.     Past Surgical History:  has a past surgical history that includes Cholecystectomy; Appendectomy; Leg Surgery; Toe Surgery; Foot surgery; Clavicle surgery; and Dental surgery. Social History:  reports that she has been smoking cigarettes. She has been smoking about 0.50 packs per day. She has never used smokeless tobacco. She reports that she does not drink alcohol and does not use drugs. Family History: family history is not on file. . Unless otherwise noted, family history is non contributory    The patients home medications have been reviewed. Allergies: Patient has no known allergies. I have reviewed the past medical history, past surgical history, social history, and family history    ---------------------------------------------------PHYSICAL EXAM--------------------------------------    Constitutional/General: Alert and oriented x3, chronically ill-appearing but no acute distress  Head: Normocephalic and atraumatic  Eyes:  EOMI, sclera non icteric  ENT: Oropharynx clear, handling secretions, no trismus, no asymmetry of the posterior oropharynx or uvular edema  Neck: Supple, full ROM, no stridor, no meningeal signs  Respiratory: Tachypneic, increased respiratory effort, no conversational dyspnea, lungs are diminished at the bilateral bases, no wheezing/rales/rhonchi  Cardiovascular:  Regular rate. Regular rhythm. No murmurs, no gallops, no rubs. 2+ distal pulses. Equal extremity pulses. Gastrointestinal:  Abdomen Soft, Non tender, Non distended. No rebound, guarding, or rigidity. No pulsatile masses. Musculoskeletal: Moves all extremities x 4. Warm and well perfused, no clubbing, no cyanosis, no edema. Capillary refill <3 seconds  Skin: skin warm and dry. No rashes.    Neurologic: GCS 15, no focal deficits, symmetric strength 5/5 in the upper and lower extremities bilaterally  Psychiatric: Normal Affect    -------------------------------------------------- RESULTS -------------------------------------------------  I have personally reviewed all laboratory and imaging results for this patient. Results are listed below.      LABS: (Lab results interpreted by me)  Results for orders placed or performed during the hospital encounter of 12/13/21   COVID-19, Rapid    Specimen: Nasopharyngeal Swab   Result Value Ref Range    SARS-CoV-2, NAAT Not Detected Not Detected   CBC auto differential   Result Value Ref Range    WBC 14.4 (H) 4.5 - 11.5 E9/L    RBC 4.03 3.50 - 5.50 E12/L    Hemoglobin 12.6 11.5 - 15.5 g/dL    Hematocrit 39.2 34.0 - 48.0 %    MCV 97.3 80.0 - 99.9 fL    MCH 31.3 26.0 - 35.0 pg    MCHC 32.1 32.0 - 34.5 %    RDW 14.6 11.5 - 15.0 fL    Platelets 570 231 - 336 E9/L    MPV 10.4 7.0 - 12.0 fL    Neutrophils % 79.7 43.0 - 80.0 %    Immature Granulocytes % 2.9 0.0 - 5.0 %    Lymphocytes % 11.6 (L) 20.0 - 42.0 %    Monocytes % 5.4 2.0 - 12.0 %    Eosinophils % 0.1 0.0 - 6.0 %    Basophils % 0.3 0.0 - 2.0 %    Neutrophils Absolute 11.45 (H) 1.80 - 7.30 E9/L    Immature Granulocytes # 0.42 E9/L    Lymphocytes Absolute 1.66 1.50 - 4.00 E9/L    Monocytes Absolute 0.78 0.10 - 0.95 E9/L    Eosinophils Absolute 0.02 (L) 0.05 - 0.50 E9/L    Basophils Absolute 0.04 0.00 - 0.20 E9/L   Comprehensive Metabolic Panel w/ Reflex to MG   Result Value Ref Range    Sodium 136 132 - 146 mmol/L    Potassium reflex Magnesium 5.0 3.5 - 5.0 mmol/L    Chloride 98 98 - 107 mmol/L    CO2 14 (L) 22 - 29 mmol/L    Anion Gap 24 (H) 7 - 16 mmol/L    Glucose 326 (H) 74 - 99 mg/dL    BUN 48 (H) 6 - 23 mg/dL    CREATININE 1.8 (H) 0.5 - 1.0 mg/dL    GFR Non-African American 27 >=60 mL/min/1.73    GFR African American 33     Calcium 9.7 8.6 - 10.2 mg/dL    Total Protein 6.9 6.4 - 8.3 g/dL    Albumin 4.1 3.5 - 5.2 g/dL    Total Bilirubin 0.4 0.0 - 1.2 mg/dL    Alkaline Phosphatase 113 (H) 35 - 104 U/L    ALT 52 (H) 0 - 32 U/L    AST 94 (H) 0 - 31 U/L   Troponin   Result Value Ref Range    Troponin, High Sensitivity 1,215 (H) 0 - 9 ng/L   Brain Natriuretic Peptide   Result Value Ref Range    Pro-BNP 50,183 (H) 0 - 450 pg/mL   CK   Result Value Ref Range    Total CK 1,547 (H) 20 - 180 U/L   Urinalysis with Microscopic   Result Value Ref Range    Color, UA Yellow Straw/Yellow    Clarity, UA CLOUDY (A) Clear    Glucose, Ur Negative Negative mg/dL    Bilirubin Urine SMALL (A) Negative    Ketones, Urine TRACE (A) Negative mg/dL    Specific Gravity, UA 1.025 1.005 - 1.030    Blood, Urine MODERATE (A) Negative    pH, UA 5.0 5.0 - 9.0    Protein, UA 30 (A) Negative mg/dL    Urobilinogen, Urine 0.2 <2.0 E.U./dL    Nitrite, Urine Negative Negative    Leukocyte Esterase, Urine SMALL (A) Negative    WBC, UA 10-20 (A) 0 - 5 /HPF    RBC, UA 10-20 (A) 0 - 2 /HPF    Bacteria, UA MODERATE (A) None Seen /HPF   Troponin   Result Value Ref Range    Troponin, High Sensitivity 964 (H) 0 - 9 ng/L   Comprehensive Metabolic Panel w/ Reflex to MG   Result Value Ref Range    Sodium 135 132 - 146 mmol/L    Potassium reflex Magnesium 4.3 3.5 - 5.0 mmol/L    Chloride 98 98 - 107 mmol/L    CO2 17 (L) 22 - 29 mmol/L    Anion Gap 20 (H) 7 - 16 mmol/L    Glucose 250 (H) 74 - 99 mg/dL    BUN 49 (H) 6 - 23 mg/dL    CREATININE 1.8 (H) 0.5 - 1.0 mg/dL    GFR Non-African American 27 >=60 mL/min/1.73    GFR African American 33     Calcium 9.0 8.6 - 10.2 mg/dL    Total Protein 6.4 6.4 - 8.3 g/dL    Albumin 3.9 3.5 - 5.2 g/dL    Total Bilirubin 0.3 0.0 - 1.2 mg/dL    Alkaline Phosphatase 100 35 - 104 U/L    ALT 45 (H) 0 - 32 U/L    AST 84 (H) 0 - 31 U/L   PH, VENOUS   Result Value Ref Range    pH, Nahid 7.29 (L) 7.35 - 7.45   Beta-Hydroxybutyrate   Result Value Ref Range    Beta-Hydroxybutyrate 1.98 (H) 0.02 - 0.27 mmol/L   LACTIC ACID, PLASMA   Result Value Ref Range    Lactic Acid 3.8 (HH) 0.5 - 2.2 mmol/L   CBC   Result Value Ref Range    WBC 11.5 4.5 - 11.5 E9/L    RBC 3.82 3.50 - 5.50 E12/L    Hemoglobin 11.9 11.5 - 15.5 g/dL    Hematocrit 36.8 34.0 - 48.0 %    MCV 96.3 80.0 - 99.9 fL    MCH 31.2 26.0 - 35.0 pg    MCHC 32.3 32.0 - 34.5 %    RDW 14.6 11.5 - 15.0 fL    Platelets 652 425 - 635 E9/L    MPV 10.1 7.0 - 12.0 fL   APTT   Result Value Ref Range    aPTT 25.5 24.5 - 35.1 sec   POCT glucose   Result Value Ref Range    Glucose 288 mg/dL    QC OK? ok    POCT Glucose   Result Value Ref Range    Meter Glucose 288 (H) 74 - 99 mg/dL   POCT Glucose - every hour   Result Value Ref Range    Glucose 244 mg/dL    QC OK? y    POCT Glucose   Result Value Ref Range    Meter Glucose 244 (H) 74 - 99 mg/dL   POCT Glucose   Result Value Ref Range    Meter Glucose 204 (H) 74 - 99 mg/dL   EKG 12 Lead   Result Value Ref Range    Ventricular Rate 72 BPM    Atrial Rate 72 BPM    P-R Interval 228 ms    QRS Duration 128 ms    Q-T Interval 492 ms    QTc Calculation (Bazett) 538 ms    P Axis 39 degrees    R Axis 52 degrees    T Axis 18 degrees   ,       RADIOLOGY:  Interpreted by Radiologist unless otherwise specified  CT ABDOMEN PELVIS WO CONTRAST Additional Contrast? None   Final Result   No acute process in the abdomen and pelvis. Probable cirrhotic liver with hemangioma in the left lobe. 2 cm left renal cyst.      Constipation. Fat containing ventral hernia, upper abdomen. Bibasilar atelectasis, lung bases. RECOMMENDATIONS:   Unavailable         CT HEAD WO CONTRAST   Final Result   No acute intracranial abnormality. RECOMMENDATIONS:   Unavailable         CT CERVICAL SPINE WO CONTRAST   Final Result   No acute abnormality of the cervical spine. Multilevel degenerative changes, as noted above. RECOMMENDATIONS:   Unavailable         CTA CHEST W CONTRAST   Final Result   No evidence of pulmonary embolism. Suspect cardiomegaly and pulmonary vascular congestion. RECOMMENDATIONS:   Unavailable         XR CHEST PORTABLE   Final Result   Pulmonary vascular congestion and suspect early CHF. Please correlate   clinically.          XR PELVIS (1-2 VIEWS)   Final Result   No acute bony abnormality. EKG Interpretation  Interpreted by emergency department physician, Dr. Kam Dunne    EKG: This EKG is signed and interpreted by me. Rate: 72  Rhythm: Sinus  Interpretation: Normal sinus rhythm with first-degree AV block, normal axis, right bundle branch block, nonspecific changes throughout, QTC is 538  Comparison: changes compared to previous EKG       ------------------------- NURSING NOTES AND VITALS REVIEWED ---------------------------   The nursing notes within the ED encounter and vital signs as below have been reviewed by myself  /66   Pulse 70   Temp 97.8 °F (36.6 °C)   Resp 18   Ht 5' 1\" (1.549 m)   Wt 175 lb (79.4 kg)   SpO2 95%   BMI 33.07 kg/m²     Oxygen Saturation Interpretation: Abnormal    The patients available past medical records and past encounters were reviewed.         ------------------------------ ED COURSE/MEDICAL DECISION MAKING----------------------  Medications   heparin (porcine) injection 4,000 Units (has no administration in time range)   heparin (porcine) injection 2,000 Units (has no administration in time range)   heparin 25,000 units in dextrose 5% 250 mL (premix) infusion (12 Units/kg/hr × 79.4 kg IntraVENous New Bag 12/13/21 2121)   dextrose 50 % IV solution (has no administration in time range)   potassium chloride 10 mEq/100 mL IVPB (Peripheral Line) (has no administration in time range)   magnesium sulfate 1000 mg in dextrose 5% 100 mL IVPB (has no administration in time range)   sodium phosphate 10 mmol in dextrose 5 % 250 mL IVPB (has no administration in time range)     Or   sodium phosphate 15 mmol in dextrose 5 % 250 mL IVPB (has no administration in time range)     Or   sodium phosphate 20 mmol in dextrose 5 % 500 mL IVPB (has no administration in time range)   0.45 % sodium chloride infusion ( IntraVENous Not Given 12/13/21 2234)   dextrose 5 % and 0.45 % NaCl with KCl 20 mEq infusion ( patient's congestive heart failure and hypoxia. Potassium was stable at 5.0. Rapid Covid was negative. Did obtain a CTA of her chest which was negative for PE. CTA did show pulmonary vascular congestion. CT head and cervical spine were negative for acute abnormalities. I did consult with cardiology who agreed with aspirin and heparin which patient was given in the emergency department. She was also started on DKA protocol. Consulted with ICU who accepted for admission. Confirmed with cardiology that they would like the patient to stay here at [facility]. Hospitalist accepted for admission. Patient verbalized understanding agreement to treatment plan and admission. Oxygen Saturation Interpretation: 87 % on room air. Re-Evaluations:  ED Course as of 12/14/21 0006   Mon Dec 13, 2021   2000 Consulted with Dr. Celia Bautista who agreed with heparin and plan. He would like the patient NPO after midnight. [KG]   2013 Called Dr. Kate Lake back, and he states patient can be admitted here and they don't emergently need the cath lab. [KG]   2029 Consulted with Dr. Gino Woods, ICU, who accepted for admission. [KG]   2100 Consulted with Dr. Sonai Triana, hospitalist, who accepted for admission. [KG]      ED Course User Index  [KG] Helen Schwartz DO       Reevaluated with the patient and she was resting comfortably on 2 L nasal cannula saturating at 95%. This patient's ED course included: a personal history and physicial examination, re-evaluation prior to disposition, multiple bedside re-evaluations, IV medications, cardiac monitoring, continuous pulse oximetry and complex medical decision making and emergency management    This patient has remained hemodynamically stable during their ED course. Consultations:    Spoke with Dr. Kate Lake (Cardiology). Discussed case. They will provide consultation. Spoke with Dr. Gino Woods (ICU). Discussed case. They will provide consultation.     Spoke with

## 2021-12-13 NOTE — PROGRESS NOTES
Radiology Procedure Waiver   Name: Meg Petty  : 1945  MRN: [id number]    Date:  21    Time: 6:02 PM EST    Benefits of immediately proceeding with radiology exam(s) without pre-testing outweigh the risks or are not indicated as specified below and therefore the following is/are being waived:    [x] Benefits of immediate radiology exam(s) outweigh any risk. OR    Pre-exam testing is not indicated for the following reason(s):  [] Pregnancy test   [] Patients LMP on-time and regular.   [] Patient had Tubal Ligation or has other Contraception Device. [] Patient  is Menopausal or Premenarcheal.    [] Patient had Full or Partial Hysterectomy. [] Protocol for CT contrast allegry   [] Patient has tolerated well previously   [] Patient does not have a true allergy    [] MRI Questionnaire     [] BUN/Creatinine   [] Patient age w/no hx of renal dysfunction. [] Patient on Dialysis. [] Recent Normal Labs.   Electronically signed by Eliu Manuel DO on 21 at 6:02 PM EST

## 2021-12-13 NOTE — ED NOTES
Bed: 17  Expected date:   Expected time:   Means of arrival:   Comments:  EMS     David Mor[facility]oLe[facility]  12/13/21 5578

## 2021-12-14 LAB
ANION GAP SERPL CALCULATED.3IONS-SCNC: 15 MMOL/L (ref 7–16)
ANION GAP SERPL CALCULATED.3IONS-SCNC: 16 MMOL/L (ref 7–16)
APTT: 75.8 SEC (ref 24.5–35.1)
BASOPHILS ABSOLUTE: 0.03 E9/L (ref 0–0.2)
BASOPHILS RELATIVE PERCENT: 0.3 % (ref 0–2)
BUN BLDV-MCNC: 43 MG/DL (ref 6–23)
BUN BLDV-MCNC: 47 MG/DL (ref 6–23)
CALCIUM SERPL-MCNC: 7.5 MG/DL (ref 8.6–10.2)
CALCIUM SERPL-MCNC: 9 MG/DL (ref 8.6–10.2)
CHLORIDE BLD-SCNC: 102 MMOL/L (ref 98–107)
CHLORIDE BLD-SCNC: 107 MMOL/L (ref 98–107)
CO2: 16 MMOL/L (ref 22–29)
CO2: 19 MMOL/L (ref 22–29)
CREAT SERPL-MCNC: 1.5 MG/DL (ref 0.5–1)
CREAT SERPL-MCNC: 1.8 MG/DL (ref 0.5–1)
EOSINOPHILS ABSOLUTE: 0.03 E9/L (ref 0.05–0.5)
EOSINOPHILS RELATIVE PERCENT: 0.3 % (ref 0–6)
GFR AFRICAN AMERICAN: 33
GFR AFRICAN AMERICAN: 41
GFR NON-AFRICAN AMERICAN: 27 ML/MIN/1.73
GFR NON-AFRICAN AMERICAN: 34 ML/MIN/1.73
GLUCOSE BLD-MCNC: 115 MG/DL (ref 74–99)
GLUCOSE BLD-MCNC: 184 MG/DL (ref 74–99)
HBA1C MFR BLD: 6.5 % (ref 4–5.6)
HCT VFR BLD CALC: 31.8 % (ref 34–48)
HEMOGLOBIN: 10.3 G/DL (ref 11.5–15.5)
IMMATURE GRANULOCYTES #: 0.06 E9/L
IMMATURE GRANULOCYTES %: 0.6 % (ref 0–5)
LACTIC ACID: 1.7 MMOL/L (ref 0.5–2.2)
LACTIC ACID: 2.8 MMOL/L (ref 0.5–2.2)
LACTIC ACID: 3.6 MMOL/L (ref 0.5–2.2)
LV EF: 43 %
LVEF MODALITY: NORMAL
LYMPHOCYTES ABSOLUTE: 2.41 E9/L (ref 1.5–4)
LYMPHOCYTES RELATIVE PERCENT: 23.1 % (ref 20–42)
MAGNESIUM: 1.8 MG/DL (ref 1.6–2.6)
MAGNESIUM: 2 MG/DL (ref 1.6–2.6)
MCH RBC QN AUTO: 31.4 PG (ref 26–35)
MCHC RBC AUTO-ENTMCNC: 32.4 % (ref 32–34.5)
MCV RBC AUTO: 97 FL (ref 80–99.9)
METER GLUCOSE: 102 MG/DL (ref 74–99)
METER GLUCOSE: 110 MG/DL (ref 74–99)
METER GLUCOSE: 113 MG/DL (ref 74–99)
METER GLUCOSE: 118 MG/DL (ref 74–99)
METER GLUCOSE: 133 MG/DL (ref 74–99)
METER GLUCOSE: 136 MG/DL (ref 74–99)
METER GLUCOSE: 138 MG/DL (ref 74–99)
METER GLUCOSE: 158 MG/DL (ref 74–99)
METER GLUCOSE: 180 MG/DL (ref 74–99)
METER GLUCOSE: 189 MG/DL (ref 74–99)
METER GLUCOSE: 192 MG/DL (ref 74–99)
METER GLUCOSE: 215 MG/DL (ref 74–99)
MONOCYTES ABSOLUTE: 0.97 E9/L (ref 0.1–0.95)
MONOCYTES RELATIVE PERCENT: 9.3 % (ref 2–12)
NEUTROPHILS ABSOLUTE: 6.94 E9/L (ref 1.8–7.3)
NEUTROPHILS RELATIVE PERCENT: 66.4 % (ref 43–80)
PDW BLD-RTO: 14.6 FL (ref 11.5–15)
PHOSPHORUS: 4.1 MG/DL (ref 2.5–4.5)
PHOSPHORUS: 4.6 MG/DL (ref 2.5–4.5)
PLATELET # BLD: 162 E9/L (ref 130–450)
PMV BLD AUTO: 10.7 FL (ref 7–12)
POTASSIUM SERPL-SCNC: 3.5 MMOL/L (ref 3.5–5)
POTASSIUM SERPL-SCNC: 3.5 MMOL/L (ref 3.5–5)
PRO-BNP: ABNORMAL PG/ML (ref 0–450)
RBC # BLD: 3.28 E12/L (ref 3.5–5.5)
SODIUM BLD-SCNC: 137 MMOL/L (ref 132–146)
SODIUM BLD-SCNC: 138 MMOL/L (ref 132–146)
WBC # BLD: 10.4 E9/L (ref 4.5–11.5)

## 2021-12-14 PROCEDURE — 85730 THROMBOPLASTIN TIME PARTIAL: CPT

## 2021-12-14 PROCEDURE — 93306 TTE W/DOPPLER COMPLETE: CPT

## 2021-12-14 PROCEDURE — 6360000002 HC RX W HCPCS: Performed by: STUDENT IN AN ORGANIZED HEALTH CARE EDUCATION/TRAINING PROGRAM

## 2021-12-14 PROCEDURE — 6370000000 HC RX 637 (ALT 250 FOR IP): Performed by: INTERNAL MEDICINE

## 2021-12-14 PROCEDURE — 83735 ASSAY OF MAGNESIUM: CPT

## 2021-12-14 PROCEDURE — 83605 ASSAY OF LACTIC ACID: CPT

## 2021-12-14 PROCEDURE — 6360000004 HC RX CONTRAST MEDICATION: Performed by: NURSE PRACTITIONER

## 2021-12-14 PROCEDURE — 2500000003 HC RX 250 WO HCPCS: Performed by: STUDENT IN AN ORGANIZED HEALTH CARE EDUCATION/TRAINING PROGRAM

## 2021-12-14 PROCEDURE — 2580000003 HC RX 258: Performed by: STUDENT IN AN ORGANIZED HEALTH CARE EDUCATION/TRAINING PROGRAM

## 2021-12-14 PROCEDURE — 80048 BASIC METABOLIC PNL TOTAL CA: CPT

## 2021-12-14 PROCEDURE — 93005 ELECTROCARDIOGRAM TRACING: CPT | Performed by: NURSE PRACTITIONER

## 2021-12-14 PROCEDURE — 36415 COLL VENOUS BLD VENIPUNCTURE: CPT

## 2021-12-14 PROCEDURE — 2580000003 HC RX 258: Performed by: INTERNAL MEDICINE

## 2021-12-14 PROCEDURE — 83880 ASSAY OF NATRIURETIC PEPTIDE: CPT

## 2021-12-14 PROCEDURE — 82962 GLUCOSE BLOOD TEST: CPT

## 2021-12-14 PROCEDURE — 6370000000 HC RX 637 (ALT 250 FOR IP): Performed by: NURSE PRACTITIONER

## 2021-12-14 PROCEDURE — 6370000000 HC RX 637 (ALT 250 FOR IP): Performed by: STUDENT IN AN ORGANIZED HEALTH CARE EDUCATION/TRAINING PROGRAM

## 2021-12-14 PROCEDURE — 85025 COMPLETE CBC W/AUTO DIFF WBC: CPT

## 2021-12-14 PROCEDURE — 84100 ASSAY OF PHOSPHORUS: CPT

## 2021-12-14 PROCEDURE — 2060000000 HC ICU INTERMEDIATE R&B

## 2021-12-14 PROCEDURE — 83036 HEMOGLOBIN GLYCOSYLATED A1C: CPT

## 2021-12-14 PROCEDURE — 87081 CULTURE SCREEN ONLY: CPT

## 2021-12-14 PROCEDURE — 99223 1ST HOSP IP/OBS HIGH 75: CPT | Performed by: INTERNAL MEDICINE

## 2021-12-14 RX ORDER — ATORVASTATIN CALCIUM 20 MG/1
20 TABLET, FILM COATED ORAL NIGHTLY
Status: DISCONTINUED | OUTPATIENT
Start: 2021-12-14 | End: 2021-12-15

## 2021-12-14 RX ORDER — HYDROXYZINE PAMOATE 25 MG/1
25 CAPSULE ORAL 3 TIMES DAILY PRN
Status: DISCONTINUED | OUTPATIENT
Start: 2021-12-14 | End: 2021-12-20 | Stop reason: HOSPADM

## 2021-12-14 RX ORDER — HYDROXYZINE PAMOATE 25 MG/1
25 CAPSULE ORAL 3 TIMES DAILY PRN
COMMUNITY

## 2021-12-14 RX ORDER — ONDANSETRON 4 MG/1
4 TABLET, ORALLY DISINTEGRATING ORAL EVERY 8 HOURS PRN
Status: ON HOLD | COMMUNITY
End: 2021-12-20 | Stop reason: HOSPADM

## 2021-12-14 RX ORDER — LEVOTHYROXINE SODIUM 0.07 MG/1
75 TABLET ORAL DAILY
Status: DISCONTINUED | OUTPATIENT
Start: 2021-12-14 | End: 2021-12-20 | Stop reason: HOSPADM

## 2021-12-14 RX ORDER — HYDROCODONE BITARTRATE AND ACETAMINOPHEN 5; 325 MG/1; MG/1
1 TABLET ORAL 3 TIMES DAILY PRN
Status: DISCONTINUED | OUTPATIENT
Start: 2021-12-14 | End: 2021-12-20 | Stop reason: HOSPADM

## 2021-12-14 RX ORDER — TIZANIDINE 4 MG/1
4 TABLET ORAL EVERY 6 HOURS PRN
Status: ON HOLD | COMMUNITY
End: 2021-12-20 | Stop reason: HOSPADM

## 2021-12-14 RX ORDER — ASPIRIN 81 MG/1
81 TABLET ORAL DAILY
Status: DISCONTINUED | OUTPATIENT
Start: 2021-12-14 | End: 2021-12-20 | Stop reason: HOSPADM

## 2021-12-14 RX ORDER — PANTOPRAZOLE SODIUM 40 MG/1
40 TABLET, DELAYED RELEASE ORAL
Status: DISCONTINUED | OUTPATIENT
Start: 2021-12-14 | End: 2021-12-20 | Stop reason: HOSPADM

## 2021-12-14 RX ORDER — TIZANIDINE 4 MG/1
4 TABLET ORAL EVERY 12 HOURS PRN
Status: DISCONTINUED | OUTPATIENT
Start: 2021-12-14 | End: 2021-12-20 | Stop reason: HOSPADM

## 2021-12-14 RX ORDER — DULOXETIN HYDROCHLORIDE 60 MG/1
60 CAPSULE, DELAYED RELEASE ORAL DAILY
Status: DISCONTINUED | OUTPATIENT
Start: 2021-12-14 | End: 2021-12-20 | Stop reason: HOSPADM

## 2021-12-14 RX ADMIN — PANTOPRAZOLE SODIUM 40 MG: 40 TABLET, DELAYED RELEASE ORAL at 09:58

## 2021-12-14 RX ADMIN — INSULIN HUMAN 10 UNITS: 100 INJECTION, SUSPENSION SUBCUTANEOUS at 16:52

## 2021-12-14 RX ADMIN — POTASSIUM CHLORIDE 10 MEQ: 10 INJECTION, SOLUTION INTRAVENOUS at 04:47

## 2021-12-14 RX ADMIN — HYDROCODONE BITARTRATE AND ACETAMINOPHEN 1 TABLET: 5; 325 TABLET ORAL at 09:01

## 2021-12-14 RX ADMIN — HYDROCODONE BITARTRATE AND ACETAMINOPHEN 1 TABLET: 5; 325 TABLET ORAL at 21:40

## 2021-12-14 RX ADMIN — HYDROCODONE BITARTRATE AND ACETAMINOPHEN 1 TABLET: 5; 325 TABLET ORAL at 15:40

## 2021-12-14 RX ADMIN — SODIUM CHLORIDE 1.59 UNITS/HR: 9 INJECTION, SOLUTION INTRAVENOUS at 00:44

## 2021-12-14 RX ADMIN — INSULIN LISPRO 1 UNITS: 100 INJECTION, SOLUTION INTRAVENOUS; SUBCUTANEOUS at 16:52

## 2021-12-14 RX ADMIN — LEVOTHYROXINE SODIUM 75 MCG: 75 TABLET ORAL at 09:58

## 2021-12-14 RX ADMIN — POTASSIUM CHLORIDE 10 MEQ: 10 INJECTION, SOLUTION INTRAVENOUS at 02:50

## 2021-12-14 RX ADMIN — POTASSIUM CHLORIDE 10 MEQ: 10 INJECTION, SOLUTION INTRAVENOUS at 03:43

## 2021-12-14 RX ADMIN — SODIUM CHLORIDE 0.84 UNITS/HR: 9 INJECTION, SOLUTION INTRAVENOUS at 02:06

## 2021-12-14 RX ADMIN — ATORVASTATIN CALCIUM 20 MG: 20 TABLET, FILM COATED ORAL at 21:34

## 2021-12-14 RX ADMIN — DULOXETINE HYDROCHLORIDE 60 MG: 60 CAPSULE, DELAYED RELEASE ORAL at 10:49

## 2021-12-14 RX ADMIN — PERFLUTREN 1.65 MG: 6.52 INJECTION, SUSPENSION INTRAVENOUS at 12:03

## 2021-12-14 RX ADMIN — POTASSIUM CHLORIDE, DEXTROSE MONOHYDRATE AND SODIUM CHLORIDE: 150; 5; 450 INJECTION, SOLUTION INTRAVENOUS at 05:44

## 2021-12-14 RX ADMIN — METOPROLOL TARTRATE 25 MG: 25 TABLET, FILM COATED ORAL at 09:58

## 2021-12-14 RX ADMIN — INSULIN HUMAN 10 UNITS: 100 INJECTION, SUSPENSION SUBCUTANEOUS at 11:18

## 2021-12-14 RX ADMIN — ASPIRIN 81 MG: 81 TABLET, COATED ORAL at 09:58

## 2021-12-14 RX ADMIN — HYDROXYZINE PAMOATE 25 MG: 25 CAPSULE ORAL at 13:36

## 2021-12-14 RX ADMIN — TIZANIDINE 4 MG: 4 TABLET ORAL at 15:41

## 2021-12-14 RX ADMIN — METOPROLOL TARTRATE 25 MG: 25 TABLET, FILM COATED ORAL at 21:34

## 2021-12-14 RX ADMIN — SODIUM CHLORIDE: 4.5 INJECTION, SOLUTION INTRAVENOUS at 11:26

## 2021-12-14 RX ADMIN — Medication 1000 MG: at 08:27

## 2021-12-14 ASSESSMENT — PAIN SCALES - GENERAL
PAINLEVEL_OUTOF10: 0
PAINLEVEL_OUTOF10: 5
PAINLEVEL_OUTOF10: 10
PAINLEVEL_OUTOF10: 0
PAINLEVEL_OUTOF10: 7
PAINLEVEL_OUTOF10: 0
PAINLEVEL_OUTOF10: 9
PAINLEVEL_OUTOF10: 4
PAINLEVEL_OUTOF10: 5
PAINLEVEL_OUTOF10: 5

## 2021-12-14 ASSESSMENT — PAIN DESCRIPTION - LOCATION
LOCATION: BACK
LOCATION: BACK;SHOULDER

## 2021-12-14 ASSESSMENT — PAIN DESCRIPTION - ORIENTATION
ORIENTATION: RIGHT;LEFT
ORIENTATION: LOWER

## 2021-12-14 ASSESSMENT — PAIN DESCRIPTION - PAIN TYPE
TYPE: CHRONIC PAIN

## 2021-12-14 NOTE — CONSULTS
Department of Internal Medicine  Division of Pulmonary, Critical Care and Sleep Medicine  ICU Attending Addendum    Attending Physician Statement  Max Torres was seen, examined and discussed with the multi-disciplinary ICU team during rounds. I have personally seen and examined the patient and the key elements of the encounter were performed by me. The medications & laboratory data was discussed and adjusted where necessary. The radiographic images were reviewed either as a group or with radiologist if felt dis-concordant with the exam or history. The above findings were corroborated, plans confirmed and changes made if needed. Family is updated at the bedside if available. Key issues of the case were discussed among consultants. Critical Care time is documented if appropriate. Changes to the notes are place in italicized print. Briefly,   DKA, improving  To start NPH, to d/c insulin gtt    AMI,  Heparin gtt, ASA and BB  ECHO to follow    ZAMZAM, improving  Cont gentle IVF    UTI? Rocephin #2, to d/c if UC (-)      All other information is noted in the above team note.

## 2021-12-14 NOTE — PROGRESS NOTES
Patient admitted from ER 17 to 213, with the following belongings socks, shirt, undergarments, placed on monitor, patient oriented to room and unit visiting hours. Patient guide at bedside, reviewed patient rights and responsibilities. MRSA nasal swab obtained. Bed alarm on. Call light within reach.

## 2021-12-14 NOTE — CONSULTS
I independently interviewed and examined the patient. I have reviewed the above documentation completed by the GIUSEPPE. Please see my additional contributions to the HPI, physical exam, and assessment / medical decision making. Reason for consult: NSTEMI    She was previously known to Dr. Rochelle Armendariz service at [facility]) cardiology. Mrs. Edith Ravi is a 57-year-old  female with history of CAD status post CABG x1 with a LIMA to LAD underwent in December 2016 [facility], brief run of PAD, remote tobacco abuse, hypertension, hyperlipidemia, hypothyroidism on HRT, type 2 diabetes currently off insulin, chronic lower back pain status post multiple epidural blocks, tremors, recurrent mechanical falls. She was recently hospitalized in June 2021 following a fall and sustained multiple rib fractures and orthostatic blood pressures were negative at that time. She also had a slight elevated troponins during that time no further ischemic work-up was done. She does not ambulate with a walker or a cane. She presented to the emergency room on 12/13/2021 following falls x2. She told me she fell forward she hit her chest.  She was dizzy and lightheaded and also noticed increased thirst.  She lives with her . She did not have any chest pain or trouble breathing prior to falling. After she is had a fall she noticed midsternal chest pain mainly on getting up and also on breathing and also tender on touching the anterior chest wall. Patient brought to the emergency room for evaluation. After she presented to the emergency room she complained midsternal tightness and also tightness of the abdomen. Since then she has been having chest pain mainly on deep breathing and touching her chest otherwise no pain. She was noted to have a blood pressure of 98/53 heart rate 78 echo with 19 was negative WBC 14,000. Urinalysis positive. BUNs/creatinine 45/1.8 with a baseline creatinine of 1.2.   Lactic acid 2.8 proBNP 50,000. She had a CTA chest that was negative and also CT of the abdomen pelvis which showed no acute findings. Her lab work showed evidence of diabetic ketoacidosis for which she was initiated on IV fluids and insulin. Her initial troponin was elevated at 1215 and repeat 1 was 964 patient was initiated on heparin for possible NSTEMI. Her proBNP was 1547. AST/ALT 84/45 hemoglobin M6.5 hemoglobin on presentation 11.5 and repeat was 1 was 10.3. CTA chest showed no PE and suspect cardiomegaly and pulmonary vascular congestion. CT the abdomen pelvis no acute findings probable cirrhotic liver with hemangioma left lobe. CT head and cervical spine no acute findings. Currently, she denies any pain in her chest without deep breathing or touching. Denies any shortness of breath. Review of Systems:  Cardiac: As per HPI  General: No fever, chills  Pulmonary: As per HPI  GI: No nausea, vomiting  Musculoskeletal: MAYO x 4, no focal motor deficits  Skin: Intact, no rashes  Neuro/Psych: No headache or seizures    Physical Exam:  /69   Pulse 69   Temp 98 °F (36.7 °C) (Oral)   Resp 21   Ht 5' 1\" (1.549 m)   Wt 175 lb 14.8 oz (79.8 kg)   SpO2 94%   BMI 33.24 kg/m²   Appearance: Awake, alert, no acute respiratory distress  Skin: Intact, no rash  Head: Normocephalic, atraumatic  ENMT: MMM, no rhinorrhea  Neck: Supple, no carotid bruits  Lungs: Clear to auscultation bilaterally. No wheezes, rales, or rhonchi. Has tenderness on palpation of the entire anterior chest  Cardiac: Regular rate and rhythm, +S1S2, no murmurs apparent  Abdomen: Soft, +bowel sounds  Extremities: Moves all extremities x 4, no lower extremity edema  Neurologic: No focal motor deficits apparent, normal mood and affect    Telemetry findings reviewed: SR at rate 60s, no new tachy/bradyarrhythmias overnight    EKG: Normal sinus rhythm, first-degree block, right bundle branch block, poor R wave progression, abnormal EKG.     Vitals and statin therapy  · Repeat another echocardiogram this a.m. · Management of acute renal failure and dehydration as per primary service  · Evaluation of mechanical falls as per primary service  · Further recommendations pending above. No immediate plans for cardiac cath at this time. Thank you for consulting us and will be following with you for any further cardiac recommendations    Thais Fragoso MD, Bill Valadez.   [facility]) Cardiology

## 2021-12-14 NOTE — CONSULTS
Jacoby Marroquin MD   Physician   Cardiology   Consults       Signed   Date of Service:  12/14/2021 11:04 AM                 Signed                Show:Clear all  [x]Manual[x]Template[x]Copied    Added by:  [x]Mukund Blanchard MD      []Odilon for details    I independently interviewed and examined the patient. I have reviewed the above documentation completed by the [facility]. Please see my additional contributions to the HPI, physical exam, and assessment / medical decision making.     Reason for consult: NSTEMI     She was previously known to Dr. Jill Jade service at [facility]) cardiology.     Mrs. Toña Johnston is a 73-year-old  female with history of CAD status post CABG x1 with a LIMA to LAD underwent in December 2016 [facility], brief run of PAD, remote tobacco abuse, hypertension, hyperlipidemia, hypothyroidism on HRT, type 2 diabetes currently off insulin, chronic lower back pain status post multiple epidural blocks, tremors, recurrent mechanical falls. She was recently hospitalized in June 2021 following a fall and sustained multiple rib fractures and orthostatic blood pressures were negative at that time. She also had a slight elevated troponins during that time no further ischemic work-up was done. She does not ambulate with a walker or a cane. She presented to the emergency room on 12/13/2021 following falls x2. She told me she fell forward she hit her chest.  She was dizzy and lightheaded and also noticed increased thirst.  She lives with her . She did not have any chest pain or trouble breathing prior to falling. After she is had a fall she noticed midsternal chest pain mainly on getting up and also on breathing and also tender on touching the anterior chest wall. Patient brought to the emergency room for evaluation. After she presented to the emergency room she complained midsternal tightness and also tightness of the abdomen.   Since then she has been having chest pain mainly on deep breathing and touching her chest otherwise no pain. She was noted to have a blood pressure of 98/53 heart rate 78 echo with 19 was negative WBC 14,000. Urinalysis positive. BUNs/creatinine 45/1.8 with a baseline creatinine of 1.2. Lactic acid 2.8 proBNP 50,000. She had a CTA chest that was negative and also CT of the abdomen pelvis which showed no acute findings. Her lab work showed evidence of diabetic ketoacidosis for which she was initiated on IV fluids and insulin. Her initial troponin was elevated at 1215 and repeat 1 was 964 patient was initiated on heparin for possible NSTEMI. Her proBNP was 1547. AST/ALT 84/45 hemoglobin M6.5 hemoglobin on presentation 11.5 and repeat was 1 was 10.3. CTA chest showed no PE and suspect cardiomegaly and pulmonary vascular congestion. CT the abdomen pelvis no acute findings probable cirrhotic liver with hemangioma left lobe. CT head and cervical spine no acute findings. Currently, she denies any pain in her chest without deep breathing or touching. Denies any shortness of breath.     Review of Systems:  Cardiac: As per HPI  General: No fever, chills  Pulmonary: As per HPI  GI: No nausea, vomiting  Musculoskeletal: MAYO x 4, no focal motor deficits  Skin: Intact, no rashes  Neuro/Psych: No headache or seizures     Physical Exam:  /69   Pulse 69   Temp 98 °F (36.7 °C) (Oral)   Resp 21   Ht 5' 1\" (1.549 m)   Wt 175 lb 14.8 oz (79.8 kg)   SpO2 94%   BMI 33.24 kg/m²   Appearance: Awake, alert, no acute respiratory distress  Skin: Intact, no rash  Head: Normocephalic, atraumatic  ENMT: MMM, no rhinorrhea  Neck: Supple, no carotid bruits  Lungs: Clear to auscultation bilaterally. No wheezes, rales, or rhonchi.   Has tenderness on palpation of the entire anterior chest  Cardiac: Regular rate and rhythm, +S1S2, no murmurs apparent  Abdomen: Soft, +bowel sounds  Extremities: Moves all extremities x 4, no lower extremity edema  Neurologic: No focal motor deficits apparent, normal mood and affect     Telemetry findings reviewed: SR at rate 60s, no new tachy/bradyarrhythmias overnight     EKG: Normal sinus rhythm, first-degree block, right bundle branch block, poor R wave progression, abnormal EKG.    Vitals and labs were reviewed: Blood pressure 101/69 heart rate 69 O2 sats 94% on 2 L.     LabsBUNs/creatinine 49/1.8 (baseline 1.2)>> 43/1.5 electrolytes normal.  Lactic acid 3.8>> 2.8, total CK 1547, proBNP 50,183, troponin, high-sensitivity 1215> 964, AST/ALT 84/45. Beta hydroxybutyrate 1.98, A1c 6.5 H&H 11.9/36.8>> 10.3/31.8. Urinalysis was positive for trace amount of ketones moderate blood small amount of leukocyte Estrace nitrates negative WBC 10-20 RBC 10-20 moderate bacteria.     12/13/2021 CXR:  Pulmonary vascular congestion suspect early CHF.  Please correlate clinically.     12/13/2021 Pelvic XRay:  No acute bony abnormality.     12/13/2021 CT Head:  No acute intercranial abnormality.     12/13/2021 CT of Cervical Spine:  No acute abnormality of the cervical spine. Multilevel degenerative changes, as noted above      12/13/2021 CTA Chest:  No evidence of pulmonary embolism. Suspect cardiomegaly and pulmonary vascular congestion.     12/13/2021 CT of Abdomen and Pelvis:  No acute process in the abdomen and pelvis  Probable cirrhotic liver with hemangioma in the left lobe. 2 cm left renal cyst.  Constipation  Fat-containing ventral hernia, upper abdomen  Bibasilar atelectasis, lung bases.           Assessment:  · Noncardiac chest pain which appears to be secondary to chest trauma. · History of CAD status post CABG x1 LIMA to LADOctober 2016  · Elevated troponin without chest pain or acute EKG changes in the setting of ZAMZAM and some rhabdo myelitis.   · Elevated proBNP level rule out cardiomyopathy  · Frequent mechanical falls  · Hypertension, now blood pressures are low mostly from dehydration  · CKD with ZAMZAM  · Type 2 diabetes with DKA  · Hyperlipidemia  · Tobacco use disorder  · Hypothyroidism on HRT  · Chronic lower back pain  · History of resting tremors        Plan:   · Continue IV heparin for now. · Recheck BMP  · We will obtain echocardiogram to assess LV function  · Start her on aspirin 81 mg p.o. daily and statin therapy  · Repeat another echocardiogram this a.m. · Management of acute renal failure and dehydration as per primary service  · Evaluation of mechanical falls as per primary service  · Further recommendations pending above. No immediate plans for cardiac cath at this time.        Thank you for consulting us and will be following with you for any further cardiac recommendations     Annemarie Sharma MD, [facility]) Cardiology                        Inpatient Cardiology Consultation      Reason for Consult:  NSTEMI     Consulting Physician: Dr. Bella Sims     Requesting Physician:  Dr. Riaz Canales    Date of Consultation: 12/14/2021    HISTORY OF PRESENT ILLNESS:   Ms. Tootie Baltazar is a 78year old  female who follows with Dr. Jones Daily. She was most recently seen in the office with Dr. Jones Daily on 10/13/2021. Her medical history includes CAD s/p CABG in 10/2016, brief run of PAT, remote tobacco abuse, HTN, HLD, hypothyroidism, T2DM, chronic low back pain s/p injections, tremor, recurrent falls with most recent admission for mechanical fall on 06/14/2021 with CT of the chest at that time showing multiple chronic right-sided rib fractures and chronic right clavicle fracture with CT of the head unremarkable laceration to the chin, orthostatic BP negative with elevated troponin levels not consistent with ACS (60, 54, 47, 39). Ms. Tootie Baltazar presented to [facility] ED on 1/13/2021 with complaints of falling. She states that prior to presentation she fell twice at her home due to her legs giving out due to excessive weakness. She denies accompanying LOC or hitting her head.   She states that prior to both falls she had \" tingling all over my body and dizziness and lightheadedness\". She states that she had \" just stood up both times and fell\". She states that she has had good oral intake. She denies accompanying chest pain, dyspnea, GRANADOS, orthopnea or PND prior to or after these falls. Upon arrival to the ED her VS were oral temperature 97.8-75-22-80 7% on RA-98/53. SaO2 increased to 95% on 5 L by nasal cannula. Rapid Covid test negative. Blood glucose 326. WBC 14.4. H&H 12.6/39.2. . K5.  BUN/SCR 48/1.8. CK 1547. UA small leukocyte esterase. Lactic acid 3.8. Troponin of 1215 and 964. proBNP 50,183. CT of the head and pelvic x-ray unremarkable. CTA was negative for pulmonary embolism with suspicion for cardiomegaly and pulmonary vascular congestion. EKG SR with first-degree AV block and right bundle branch block with low voltage in the limb leads and QTc of 538ms. she received a 1 L NS bolus,  mg, Heparin 4000 unit bolus and was placed on heparin infusion per low-dose protocol. She was admitted to the ICU. Cardiology was consulted for NSTEMI. Of note, Ms. Sergey Johnson states that upon arrival to the ED she noticed significant midsternal and diffuse upper abdominal pain reproducible with palpation and deep inspiration. She states that these pains have persisted since presentation. Please note: past medical records were reviewed per electronic medical record (EMR) - see detailed reports under Past Medical/ Surgical History. Past Medical and Surgical History:    14. CAD s/p CABG x1 (LIMA-LAD) in 10/2016 ([facility])  15. TTE 06/15/2021 (Dr. Mikel Norwood): Normal LV systolic function. EF is visually estimated at 60%. Normal RV size and function. There is Doppler evidence of stage I diastolic dysfunction. Mild TR. PASP is estimated at 42 mmHg. 16. Brief run of PAT  17. HTN  18. HLD  19. Continued tobacco abuse  20. Hypothyroidism on replacement therapy  21. T2DM, on Insulin   22.  Chronic low back pain s/p injections  23. Urinary hesitancy with history of bladder Botox injections  24. Tremor  25. Arthritis  26. Frequent falls  27. Admitted with mechanical fall 06/14/2021 with CT of the chest showing multiple chronic right-sided rib fractures and chronic right clavicle fracture. CT of head unremarkable. Laceration of 3 cm to the chin. Orthostatic BP negative. Elevation not consistent with ACS (60, 54, 47, 39)  28. Depression  29. S/p appendectomy, cholecystectomy, clavicle surgery, dental surgery, right lower extremity surgery (further details unknown)      Medications Prior to admit:  Prior to Admission medications    Medication Sig Start Date End Date Taking? Authorizing Provider   metFORMIN (GLUCOPHAGE) 500 MG tablet Take 1 tablet by mouth 2 times daily (with meals) 6/18/21  Yes Rachel Calabrese MD   alendronate (FOSAMAX) 70 MG tablet Take 70 mg by mouth every 7 days    Yes Historical Provider, MD   metaxalone (SKELAXIN) 800 MG tablet 1 tablet    Historical Provider, MD   alogliptin (NESINA) 6.25 MG TABS tablet Take 1 tablet by mouth daily  Patient not taking: Reported on 10/13/2021 6/19/21   Rachel Calabrese MD   pregabalin (LYRICA) 100 MG capsule Take 100 mg by mouth 4 times daily. Historical Provider, MD   vilazodone HCl (VILAZODONE HCL) 40 MG TABS Take 40 mg by mouth daily    Historical Provider, MD   atorvastatin (LIPITOR) 20 MG tablet Take 20 mg by mouth daily    Historical Provider, MD   levothyroxine (SYNTHROID) 75 MCG tablet Take 75 mcg by mouth Daily    Historical Provider, MD   LORazepam (ATIVAN) 0.5 MG tablet Take 0.5 mg by mouth 2 times daily.     Historical Provider, MD   Multiple Vitamins-Minerals (PRESERVISION AREDS 2) CAPS Take 1 capsule by mouth 2 times daily    Historical Provider, MD   raNITIdine (ZANTAC) 300 MG tablet Take 300 mg by mouth nightly    Historical Provider, MD   baclofen (LIORESAL) 20 MG tablet Take 20 mg by mouth nightly    Historical Provider, MD   loratadine (CLARITIN) 10 MG tablet Take 10 mg by mouth daily    Historical Provider, MD   HYDROcodone-acetaminophen (NORCO) 5-325 MG per tablet Take 1 tablet by mouth three times daily. Historical Provider, MD   dicyclomine (BENTYL) 10 MG capsule Take 10 mg by mouth 4 times daily (before meals and nightly)    Historical Provider, MD   lipase-protease-amylase (CREON) 37247 units delayed release capsule Take 36,000 Units by mouth 3 times daily (with meals)     Historical Provider, MD   metoprolol succinate (TOPROL XL) 25 MG extended release tablet Take 25 mg by mouth daily    Historical Provider, MD   aspirin 81 MG tablet Take 81 mg by mouth daily    Historical Provider, MD   busPIRone (BUSPAR) 15 MG tablet Take 7.5 mg by mouth 3 times daily    Historical Provider, MD   ARIPiprazole (ABILIFY) 5 MG tablet Take 5 mg by mouth daily    Historical Provider, MD   omeprazole (PRILOSEC) 20 MG capsule Take 40 mg by mouth Daily   Patient not taking: Reported on 10/13/2021    Historical Provider, MD   lisinopril-hydrochlorothiazide (PRINZIDE;ZESTORETIC) 20-25 MG per tablet Take 1 tablet by mouth daily.       Historical Provider, MD       Current Medications:    Current Facility-Administered Medications: cefTRIAXone (ROCEPHIN) 1,000 mg in sodium chloride flush 10 mL IV syringe, 1,000 mg, IntraVENous, Q24H  heparin (porcine) injection 4,000 Units, 4,000 Units, IntraVENous, PRN  heparin (porcine) injection 2,000 Units, 2,000 Units, IntraVENous, PRN  heparin 25,000 units in dextrose 5% 250 mL (premix) infusion, 5-30 Units/kg/hr, IntraVENous, Continuous  dextrose 50 % IV solution, 12.5 g, IntraVENous, PRN  potassium chloride 10 mEq/100 mL IVPB (Peripheral Line), 10 mEq, IntraVENous, PRN  magnesium sulfate 1000 mg in dextrose 5% 100 mL IVPB, 1,000 mg, IntraVENous, PRN  sodium phosphate 10 mmol in dextrose 5 % 250 mL IVPB, 10 mmol, IntraVENous, PRN **OR** sodium phosphate 15 mmol in dextrose 5 % 250 mL IVPB, 15 mmol, IntraVENous, PRN **OR** sodium phosphate 20 mmol in dextrose 5 % 500 mL IVPB, 20 mmol, IntraVENous, PRN  0.45 % sodium chloride infusion, , IntraVENous, Continuous  dextrose 5 % and 0.45 % NaCl with KCl 20 mEq infusion, , IntraVENous, Continuous PRN  insulin regular (HUMULIN R;NOVOLIN R) 100 Units in sodium chloride 0.9 % 100 mL infusion, 0.1 Units/kg/hr, IntraVENous, Continuous  cefTRIAXone (ROCEPHIN) 1 g injection, , ,     Allergies:  Patient has no known allergies. Social History:    Quit smoking cigarettes several years ago prior to that smoked socially for less than a year. Family History:   Please note this information was unable to be obtained at this time, as it is limited in nature due to the patient's advanced age. REVIEW OF SYSTEMS:     · Constitutional: Denies fevers, chills, night sweats, and fatigue  · HEENT: Denies headaches, nose bleeds, and blurred vision,oral pain, abscess or lesion. · Musculoskeletal: Denies pain to BLE with ambulation and edema to BLE. Complains of recent falling-see HPI  · Neurological: Complains of dizziness and lightheadedness-see HPI. Denies numbness and tingling  · Cardiovascular: Complains of chest pain-see HPI. Denies palpitations, and feelings of heart racing. · Respiratory: Denies orthopnea and PND  · Gastrointestinal: Denies heartburn, nausea/vomiting, diarrhea and constipation, black/bloody, and tarry stools. · Genitourinary: Denies dysuria and hematuria  · Hematologic: Denies excessive bruising or bleeding  · Lymphatic: Denies lumps and bumps to neck, axilla, breast, and groin  · Endocrine: Denies excessive thirst. Denies intolerance to hot and cold  · GYN: Postmenopausal state; Denies vaginal bleeding. · Psychiatric: Denies anxiety and depression.     PHYSICAL EXAM:   BP 97/63   Pulse 70   Temp 97.8 °F (36.6 °C) (Axillary)   Resp 23   Ht 5' 1\" (1.549 m)   Wt 175 lb 14.8 oz (79.8 kg)   SpO2 98%   BMI 33.24 kg/m²   CONST:  Well developed, obese, elderly  CARDIAC ENZYMES:  Recent Labs     21  1631   CKTOTAL 1,547*     FASTING LIPID PANEL:  Lab Results   Component Value Date    CHOL 164 2015    HDL 77 2015    LDLCALC 77 2015    TRIG 52 2015     LIVER PROFILE:  Recent Labs     21  1631 21   AST 94* 84*   ALT 52* 45*   LABALBU 4.1 3.9     Results for Frank Reyes (MRN [id number]) as of 2021 08:10   Ref. Range 2021 16:31 2021 18:44   Troponin, High Sensitivity Latest Ref Range: 0 - 9 ng/L 1,215 (H) 964 (H)     2021 CXR:  Pulmonary vascular congestion suspect early CHF. Please correlate clinically. 2021 Pelvic XRay:  No acute bony abnormality. 2021 CT Head:  No acute intercranial abnormality. 2021 CT of Cervical Spine:  No acute abnormality of the cervical spine. Multilevel degenerative changes, as noted above     2021 CTA Chest:  No evidence of pulmonary embolism. Suspect cardiomegaly and pulmonary vascular congestion. 2021 CT of Abdomen and Pelvis:  No acute process in the abdomen and pelvis  Probable cirrhotic liver with hemangioma in the left lobe. 2 cm left renal cyst.  Constipation  Fat-containing ventral hernia, upper abdomen  Bibasilar atelectasis, lung bases.     IMPRESSION and PLAN to follow per Dr. Lacey Man     Electronically signed by YVONNE Fabian CNP on 2021 at 8:05 AM

## 2021-12-14 NOTE — H&P
[facility]  History and Physical      CHIEF COMPLAINT: Weakness, shortness of breath    Reason for Admission: Diabetic ketoacidosis, non-ST elevation MI    History Obtained From: Patient, electronic medical record    PCP :  Edd Servin MD    [address]  [address] / [facility] [address]      HISTORY OF PRESENT ILLNESS:      The patient is a 78 y.o. female presents to the emergency because of weakness, fall, shortness of breath. Patient apparently has been sick for a while. Patient reports that she may not have been compliant with her diabetic medications. In the emergency room patient was noted to have a leukocytosis. Her SARS-CoV-2 testing was negative. She was noted to be in diabetic ketoacidosis of mild degree. She was noted to have renal insufficiency. Her LFTs were elevated. Troponin was significantly high at 1215 ng/L. Troponin BNP was elevated 15,183 pg/mL. Patient was felt to be having non-ST elevation MI. Cardiology recommended admission at local hospital for now. Patient was then admitted to the ICU at [facility].  Patient at the time of my visit feels okay however is unable to give accurate history. CT of the head was negative in the ER as well as series of the cervical spine showing arthritic changes only. CT of the chest was negative for PE.     Past Medical History:        Diagnosis Date    Arthritis     Depression     Diabetes mellitus ([address]     Hypertension      Past Surgical History:        Procedure Laterality Date    APPENDECTOMY      CHOLECYSTECTOMY      CLAVICLE SURGERY      DENTAL SURGERY      FOOT SURGERY      LEG SURGERY      right    TOE SURGERY           Medications Prior to Admission:    Medications Prior to Admission: metFORMIN (GLUCOPHAGE) 500 MG tablet, Take 1 tablet by mouth 2 times daily (with meals)  alendronate (FOSAMAX) 70 MG tablet, Take 70 mg by mouth every 7 days   metaxalone (SKELAXIN) 800 MG tablet, 1 tablet  alogliptin (NESINA) 6.25 MG TABS tablet, Take 1 tablet by mouth daily (Patient not taking: Reported on 10/13/2021)  pregabalin (LYRICA) 100 MG capsule, Take 100 mg by mouth 4 times daily. vilazodone HCl (VILAZODONE HCL) 40 MG TABS, Take 40 mg by mouth daily  atorvastatin (LIPITOR) 20 MG tablet, Take 20 mg by mouth daily  levothyroxine (SYNTHROID) 75 MCG tablet, Take 75 mcg by mouth Daily  LORazepam (ATIVAN) 0.5 MG tablet, Take 0.5 mg by mouth 2 times daily. Multiple Vitamins-Minerals (PRESERVISION AREDS 2) CAPS, Take 1 capsule by mouth 2 times daily  raNITIdine (ZANTAC) 300 MG tablet, Take 300 mg by mouth nightly  baclofen (LIORESAL) 20 MG tablet, Take 20 mg by mouth nightly  loratadine (CLARITIN) 10 MG tablet, Take 10 mg by mouth daily  HYDROcodone-acetaminophen (NORCO) 5-325 MG per tablet, Take 1 tablet by mouth three times daily. dicyclomine (BENTYL) 10 MG capsule, Take 10 mg by mouth 4 times daily (before meals and nightly)  lipase-protease-amylase (CREON) 60799 units delayed release capsule, Take 36,000 Units by mouth 3 times daily (with meals)   metoprolol succinate (TOPROL XL) 25 MG extended release tablet, Take 25 mg by mouth daily  aspirin 81 MG tablet, Take 81 mg by mouth daily  busPIRone (BUSPAR) 15 MG tablet, Take 7.5 mg by mouth 3 times daily  ARIPiprazole (ABILIFY) 5 MG tablet, Take 5 mg by mouth daily  omeprazole (PRILOSEC) 20 MG capsule, Take 40 mg by mouth Daily  (Patient not taking: Reported on 10/13/2021)  lisinopril-hydrochlorothiazide (PRINZIDE;ZESTORETIC) 20-25 MG per tablet, Take 1 tablet by mouth daily. Allergies:  Patient has no known allergies.     Social History:   Social History     Socioeconomic History    Marital status:      Spouse name: Not on file    Number of children: Not on file    Years of education: Not on file    Highest education level: Not on file   Occupational History    Not on file   Tobacco Use    Smoking status: Current Some Day Smoker Packs/day: 0.50     Types: Cigarettes    Smokeless tobacco: Never Used   Substance and Sexual Activity    Alcohol use: No     Alcohol/week: 2.0 standard drinks     Types: 2 Cans of beer per week    Drug use: No    Sexual activity: Not on file   Other Topics Concern    Not on file   Social History Narrative    Not on file     Social Determinants of Health     Financial Resource Strain:     Difficulty of Paying Living Expenses: Not on file   Food Insecurity:     Worried About Running Out of Food in the Last Year: Not on file    Alejandra of Food in the Last Year: Not on file   Transportation Needs:     Lack of Transportation (Medical): Not on file    Lack of Transportation (Non-Medical): Not on file   Physical Activity:     Days of Exercise per Week: Not on file    Minutes of Exercise per Session: Not on file   Stress:     Feeling of Stress : Not on file   Social Connections:     Frequency of Communication with Friends and Family: Not on file    Frequency of Social Gatherings with Friends and Family: Not on file    Attends Sikh Services: Not on file    Active Member of [address] [place] [address] or Organizations: Not on file    Attends Club or Organization Meetings: Not on file    Marital Status: Not on file   Intimate Partner Violence:     Fear of Current or Ex-Partner: Not on file    Emotionally Abused: Not on file    Physically Abused: Not on file    Sexually Abused: Not on file   Housing Stability:     Unable to Pay for Housing in the Last Year: Not on file    Number of [place] in the Last Year: Not on file    Unstable Housing in the Last Year: Not on file         Family History:   History reviewed. No pertinent family history.     REVIEW OF SYSTEMS:    General ROS: Positive generalized weakness, falls  Hematological and Lymphatic ROS: negative  Endocrine ROS: negative  Respiratory ROS: Positive for shortness of breath  Cardiovascular ROS: no chest pain or dyspnea on exertion  Gastrointestinal ROS: no abdominal pain, change in bowel habits, or black or bloody stools  Genito-Urinary ROS: no dysuria, trouble voiding, or hematuria  Neurological ROS: no TIA or stroke symptoms  negative    Vitals:  /69   Pulse 69   Temp 98 °F (36.7 °C) (Oral)   Resp 21   Ht 5' 1\" (1.549 m)   Wt 175 lb 14.8 oz (79.8 kg)   SpO2 94%   BMI 33.24 kg/m²     PHYSICAL EXAM:  General:  Awake, alert, oriented X 3. Well developed, well nourished, well groomed. No apparent distress. HEENT:  Normocephalic, atraumatic. Pupils equal, round, reactive to light. No scleral icterus. No conjunctival injection. Neck:  Supple, no carotid bruits  Heart:  RRR,   Lungs:  CTA bilaterally, bilat symmetrical expansion, no wheeze, rales, or rhonchi  Abdomen:   Bowel sounds present, soft, nontender, no masses, no organomegaly, no peritoneal signs  Extremities:  No clubbing, cyanosis, or edema  Skin:  Warm and dry, no open lesions or rash  Neuro:  Cranial nerves 2-12 intact, no focal deficits      DATA:     Recent Results (from the past 24 hour(s))   EKG 12 Lead    Collection Time: 12/13/21  4:13 PM   Result Value Ref Range    Ventricular Rate 72 BPM    Atrial Rate 72 BPM    P-R Interval 228 ms    QRS Duration 128 ms    Q-T Interval 492 ms    QTc Calculation (Bazett) 538 ms    P Axis 39 degrees    R Axis 52 degrees    T Axis 18 degrees   POCT Glucose    Collection Time: 12/13/21  4:27 PM   Result Value Ref Range    Meter Glucose 288 (H) 74 - 99 mg/dL   COVID-19, Rapid    Collection Time: 12/13/21  4:31 PM    Specimen: Nasopharyngeal Swab   Result Value Ref Range    SARS-CoV-2, NAAT Not Detected Not Detected   CBC auto differential    Collection Time: 12/13/21  4:31 PM   Result Value Ref Range    WBC 14.4 (H) 4.5 - 11.5 E9/L    RBC 4.03 3.50 - 5.50 E12/L    Hemoglobin 12.6 11.5 - 15.5 g/dL    Hematocrit 39.2 34.0 - 48.0 %    MCV 97.3 80.0 - 99.9 fL    MCH 31.3 26.0 - 35.0 pg    MCHC 32.1 32.0 - 34.5 %    RDW 14.6 11.5 - 15.0 fL    Platelets 287 130 - 450 E9/L    MPV 10.4 7.0 - 12.0 fL    Neutrophils % 79.7 43.0 - 80.0 %    Immature Granulocytes % 2.9 0.0 - 5.0 %    Lymphocytes % 11.6 (L) 20.0 - 42.0 %    Monocytes % 5.4 2.0 - 12.0 %    Eosinophils % 0.1 0.0 - 6.0 %    Basophils % 0.3 0.0 - 2.0 %    Neutrophils Absolute 11.45 (H) 1.80 - 7.30 E9/L    Immature Granulocytes # 0.42 E9/L    Lymphocytes Absolute 1.66 1.50 - 4.00 E9/L    Monocytes Absolute 0.78 0.10 - 0.95 E9/L    Eosinophils Absolute 0.02 (L) 0.05 - 0.50 E9/L    Basophils Absolute 0.04 0.00 - 0.20 E9/L   Comprehensive Metabolic Panel w/ Reflex to MG    Collection Time: 12/13/21  4:31 PM   Result Value Ref Range    Sodium 136 132 - 146 mmol/L    Potassium reflex Magnesium 5.0 3.5 - 5.0 mmol/L    Chloride 98 98 - 107 mmol/L    CO2 14 (L) 22 - 29 mmol/L    Anion Gap 24 (H) 7 - 16 mmol/L    Glucose 326 (H) 74 - 99 mg/dL    BUN 48 (H) 6 - 23 mg/dL    CREATININE 1.8 (H) 0.5 - 1.0 mg/dL    GFR Non-African American 27 >=60 mL/min/1.73    GFR African American 33     Calcium 9.7 8.6 - 10.2 mg/dL    Total Protein 6.9 6.4 - 8.3 g/dL    Albumin 4.1 3.5 - 5.2 g/dL    Total Bilirubin 0.4 0.0 - 1.2 mg/dL    Alkaline Phosphatase 113 (H) 35 - 104 U/L    ALT 52 (H) 0 - 32 U/L    AST 94 (H) 0 - 31 U/L   Troponin    Collection Time: 12/13/21  4:31 PM   Result Value Ref Range    Troponin, High Sensitivity 1,215 (H) 0 - 9 ng/L   Brain Natriuretic Peptide    Collection Time: 12/13/21  4:31 PM   Result Value Ref Range    Pro-BNP 50,183 (H) 0 - 450 pg/mL   CK    Collection Time: 12/13/21  4:31 PM   Result Value Ref Range    Total CK 1,547 (H) 20 - 180 U/L   POCT glucose    Collection Time: 12/13/21  4:35 PM   Result Value Ref Range    Glucose 288 mg/dL    QC OK? ok    Troponin    Collection Time: 12/13/21  6:44 PM   Result Value Ref Range    Troponin, High Sensitivity 964 (H) 0 - 9 ng/L   Comprehensive Metabolic Panel w/ Reflex to MG    Collection Time: 12/13/21  8:33 PM   Result Value Ref Range    Sodium 135 132 - 146 mmol/L    Potassium reflex Magnesium 4.3 3.5 - 5.0 mmol/L    Chloride 98 98 - 107 mmol/L    CO2 17 (L) 22 - 29 mmol/L    Anion Gap 20 (H) 7 - 16 mmol/L    Glucose 250 (H) 74 - 99 mg/dL    BUN 49 (H) 6 - 23 mg/dL    CREATININE 1.8 (H) 0.5 - 1.0 mg/dL    GFR Non-African American 27 >=60 mL/min/1.73    GFR African American 33     Calcium 9.0 8.6 - 10.2 mg/dL    Total Protein 6.4 6.4 - 8.3 g/dL    Albumin 3.9 3.5 - 5.2 g/dL    Total Bilirubin 0.3 0.0 - 1.2 mg/dL    Alkaline Phosphatase 100 35 - 104 U/L    ALT 45 (H) 0 - 32 U/L    AST 84 (H) 0 - 31 U/L   PH, VENOUS    Collection Time: 12/13/21  8:33 PM   Result Value Ref Range    pH, Nahid 7.29 (L) 7.35 - 7.45   Beta-Hydroxybutyrate    Collection Time: 12/13/21  8:33 PM   Result Value Ref Range    Beta-Hydroxybutyrate 1.98 (H) 0.02 - 0.27 mmol/L   LACTIC ACID, PLASMA    Collection Time: 12/13/21  8:33 PM   Result Value Ref Range    Lactic Acid 3.8 (HH) 0.5 - 2.2 mmol/L   CBC    Collection Time: 12/13/21  8:33 PM   Result Value Ref Range    WBC 11.5 4.5 - 11.5 E9/L    RBC 3.82 3.50 - 5.50 E12/L    Hemoglobin 11.9 11.5 - 15.5 g/dL    Hematocrit 36.8 34.0 - 48.0 %    MCV 96.3 80.0 - 99.9 fL    MCH 31.2 26.0 - 35.0 pg    MCHC 32.3 32.0 - 34.5 %    RDW 14.6 11.5 - 15.0 fL    Platelets 980 139 - 175 E9/L    MPV 10.1 7.0 - 12.0 fL   APTT    Collection Time: 12/13/21  8:33 PM   Result Value Ref Range    aPTT 25.5 24.5 - 35.1 sec   POCT Glucose    Collection Time: 12/13/21  8:51 PM   Result Value Ref Range    Meter Glucose 244 (H) 74 - 99 mg/dL   POCT Glucose - every hour    Collection Time: 12/13/21  8:53 PM   Result Value Ref Range    Glucose 244 mg/dL    QC OK? y    Urinalysis with Microscopic    Collection Time: 12/13/21  9:01 PM   Result Value Ref Range    Color, UA Yellow Straw/Yellow    Clarity, UA CLOUDY (A) Clear    Glucose, Ur Negative Negative mg/dL    Bilirubin Urine SMALL (A) Negative    Ketones, Urine TRACE (A) Negative mg/dL    Specific Gravity, UA 1.025 1.005 - 1. 030    Blood, Urine MODERATE (A) Negative    pH, UA 5.0 5.0 - 9.0    Protein, UA 30 (A) Negative mg/dL    Urobilinogen, Urine 0.2 <2.0 E.U./dL    Nitrite, Urine Negative Negative    Leukocyte Esterase, Urine SMALL (A) Negative    WBC, UA 10-20 (A) 0 - 5 /HPF    RBC, UA 10-20 (A) 0 - 2 /HPF    Bacteria, UA MODERATE (A) None Seen /HPF   POCT Glucose    Collection Time: 12/13/21 11:34 PM   Result Value Ref Range    Meter Glucose 204 (H) 74 - 99 mg/dL   POCT Glucose - every hour    Collection Time: 12/13/21 11:35 PM   Result Value Ref Range    Glucose 204 mg/dL    QC OK? y    POCT Glucose    Collection Time: 12/14/21 12:43 AM   Result Value Ref Range    Meter Glucose 113 (H) 74 - 99 mg/dL   Basic Metabolic Panel    Collection Time: 12/14/21  1:46 AM   Result Value Ref Range    Sodium 137 132 - 146 mmol/L    Potassium 3.5 3.5 - 5.0 mmol/L    Chloride 102 98 - 107 mmol/L    CO2 19 (L) 22 - 29 mmol/L    Anion Gap 16 7 - 16 mmol/L    Glucose 115 (H) 74 - 99 mg/dL    BUN 47 (H) 6 - 23 mg/dL    CREATININE 1.8 (H) 0.5 - 1.0 mg/dL    GFR Non-African American 27 >=60 mL/min/1.73    GFR African American 33     Calcium 9.0 8.6 - 10.2 mg/dL   Magnesium    Collection Time: 12/14/21  1:46 AM   Result Value Ref Range    Magnesium 2.0 1.6 - 2.6 mg/dL   Phosphorus    Collection Time: 12/14/21  1:46 AM   Result Value Ref Range    Phosphorus 4.6 (H) 2.5 - 4.5 mg/dL   LACTIC ACID, PLASMA    Collection Time: 12/14/21  1:46 AM   Result Value Ref Range    Lactic Acid 3.6 (HH) 0.5 - 2.2 mmol/L   POCT Glucose    Collection Time: 12/14/21  2:06 AM   Result Value Ref Range    Meter Glucose 102 (H) 74 - 99 mg/dL   POCT Glucose    Collection Time: 12/14/21  2:56 AM   Result Value Ref Range    Meter Glucose 110 (H) 74 - 99 mg/dL   APTT    Collection Time: 12/14/21  3:00 AM   Result Value Ref Range    aPTT 75.8 (H) 24.5 - 35.1 sec   POCT Glucose    Collection Time: 12/14/21  3:58 AM   Result Value Ref Range    Meter Glucose 136 (H) 74 - 99 mg/dL POCT Glucose    Collection Time: 12/14/21  5:03 AM   Result Value Ref Range    Meter Glucose 133 (H) 74 - 99 mg/dL   POCT Glucose    Collection Time: 12/14/21  5:58 AM   Result Value Ref Range    Meter Glucose 192 (H) 74 - 99 mg/dL   Phosphorus    Collection Time: 12/14/21  6:00 AM   Result Value Ref Range    Phosphorus 4.1 2.5 - 4.5 mg/dL   Magnesium    Collection Time: 12/14/21  6:00 AM   Result Value Ref Range    Magnesium 1.8 1.6 - 2.6 mg/dL   Basic Metabolic Panel    Collection Time: 12/14/21  6:00 AM   Result Value Ref Range    Sodium 138 132 - 146 mmol/L    Potassium 3.5 3.5 - 5.0 mmol/L    Chloride 107 98 - 107 mmol/L    CO2 16 (L) 22 - 29 mmol/L    Anion Gap 15 7 - 16 mmol/L    Glucose 184 (H) 74 - 99 mg/dL    BUN 43 (H) 6 - 23 mg/dL    CREATININE 1.5 (H) 0.5 - 1.0 mg/dL    GFR Non-African American 34 >=60 mL/min/1.73    GFR African American 41     Calcium 7.5 (L) 8.6 - 10.2 mg/dL   Lactic acid, plasma    Collection Time: 12/14/21  6:00 AM   Result Value Ref Range    Lactic Acid 2.8 (H) 0.5 - 2.2 mmol/L   CBC WITH AUTO DIFFERENTIAL    Collection Time: 12/14/21  6:00 AM   Result Value Ref Range    WBC 10.4 4.5 - 11.5 E9/L    RBC 3.28 (L) 3.50 - 5.50 E12/L    Hemoglobin 10.3 (L) 11.5 - 15.5 g/dL    Hematocrit 31.8 (L) 34.0 - 48.0 %    MCV 97.0 80.0 - 99.9 fL    MCH 31.4 26.0 - 35.0 pg    MCHC 32.4 32.0 - 34.5 %    RDW 14.6 11.5 - 15.0 fL    Platelets 839 260 - 241 E9/L    MPV 10.7 7.0 - 12.0 fL    Neutrophils % 66.4 43.0 - 80.0 %    Immature Granulocytes % 0.6 0.0 - 5.0 %    Lymphocytes % 23.1 20.0 - 42.0 %    Monocytes % 9.3 2.0 - 12.0 %    Eosinophils % 0.3 0.0 - 6.0 %    Basophils % 0.3 0.0 - 2.0 %    Neutrophils Absolute 6.94 1.80 - 7.30 E9/L    Immature Granulocytes # 0.06 E9/L    Lymphocytes Absolute 2.41 1.50 - 4.00 E9/L    Monocytes Absolute 0.97 (H) 0.10 - 0.95 E9/L    Eosinophils Absolute 0.03 (L) 0.05 - 0.50 E9/L    Basophils Absolute 0.03 0.00 - 0.20 E9/L   Hemoglobin A1c    Collection Time: (non-ST elevated myocardial infarction) ([facility] [address]  Resolved Problems:    * No resolved hospital problems. *  Diabetic ketoacidosis  Medication noncompliance by history  Transaminitis  Acute kidney injury  Possible UTI  Metabolic encephalopathy  Underlying history of depression/anxiety issues      Plan :    Empiric antibiotics  Insulin drip being transitioned to NPH insulin  Monitor renal and hepatic functions  Echocardiogram being ordered by cardiology  May have to simplify her psychotropic medications patient is on BuSpar, Abilify, hydrocodone, baclofen, lorazepam, will acetone, Lyrica, Skelaxin, Vistaril per home med list  Cultures so far negative    Electronically signed by Emeterio Frederick MD on 12/14/2021 at 12:04 PM    NOTE: This report was transcribed using voice recognition software.  Every effort was made to ensure accuracy; however, inadvertent transcription errors may be present

## 2021-12-14 NOTE — FLOWSHEET NOTE
C/o pain in back-states this is same pain as she has been dealing with at home due to recent falls.  Medicated with Norco and Zanaflex as ordered

## 2021-12-14 NOTE — CONSULTS
Critical Care Admit/Consult Note         Patient Raza Martinez   MRN -  [id number]   Lilian # - [de-identified]   - 1945      Date of Admission -  2021  3:18 PM  Date of evaluation -  2021/0213-A   Hospital Day - 1            ADMIT/CONSULT DETAILS     Reason for Admit/Consult   DKA and NSTEMI    Sandhya Parsons MD  Primary Care Physician - Harvin Rubinstein, MD         HPI   The patient is a 78 y.o. female with significant past medical history of depression, diabetes, and hypertension. Patient presented to the ED with shortness of breath, fatigue and a fall. Patient had a CTA of the chest that was negatvie for pulmonary embolism  but showed signs of early CHF. CT head was negative. Patient was 87% on room air and was placed on nasal cannula. Troponin was 1215. EKG was a new right bundle and elevated BNP was also noted. Cr was 1.8. Anion gap of 24, Ph 7.29, given one liter of fluids. Patient was started on heparin. Patient was transferred to the ICU on Insulin drip.      Patient has no complaints at this time      Past Medical History         Diagnosis Date    Arthritis     Depression     Diabetes mellitus ([address]     Hypertension         Past Surgical History           Procedure Laterality Date    APPENDECTOMY      CHOLECYSTECTOMY      CLAVICLE SURGERY      DENTAL SURGERY      FOOT SURGERY      LEG SURGERY      right    TOE SURGERY         Current Medications   Current Medications    cefTRIAXone         heparin (porcine), heparin (porcine), dextrose, potassium chloride, magnesium sulfate, sodium phosphate IVPB **OR** sodium phosphate IVPB **OR** sodium phosphate IVPB, dextrose 5% and 0.45% NaCl with KCl 20 mEq  IV Drips/Infusions   heparin (PORCINE) Infusion 12 Units/kg/hr (21 0330)    sodium chloride      dextrose 5% and 0.45% NaCl with KCl 20 mEq 150 mL/hr at 21 0544    insulin 1.2 Units/hr (21 0700)     Home Medications  Medications Prior to Admission: metFORMIN (GLUCOPHAGE) 500 MG tablet, Take 1 tablet by mouth 2 times daily (with meals)  alendronate (FOSAMAX) 70 MG tablet, Take 70 mg by mouth every 7 days   metaxalone (SKELAXIN) 800 MG tablet, 1 tablet  alogliptin (NESINA) 6.25 MG TABS tablet, Take 1 tablet by mouth daily (Patient not taking: Reported on 10/13/2021)  pregabalin (LYRICA) 100 MG capsule, Take 100 mg by mouth 4 times daily. vilazodone HCl (VILAZODONE HCL) 40 MG TABS, Take 40 mg by mouth daily  atorvastatin (LIPITOR) 20 MG tablet, Take 20 mg by mouth daily  levothyroxine (SYNTHROID) 75 MCG tablet, Take 75 mcg by mouth Daily  LORazepam (ATIVAN) 0.5 MG tablet, Take 0.5 mg by mouth 2 times daily. Multiple Vitamins-Minerals (PRESERVISION AREDS 2) CAPS, Take 1 capsule by mouth 2 times daily  raNITIdine (ZANTAC) 300 MG tablet, Take 300 mg by mouth nightly  baclofen (LIORESAL) 20 MG tablet, Take 20 mg by mouth nightly  loratadine (CLARITIN) 10 MG tablet, Take 10 mg by mouth daily  HYDROcodone-acetaminophen (NORCO) 5-325 MG per tablet, Take 1 tablet by mouth three times daily. dicyclomine (BENTYL) 10 MG capsule, Take 10 mg by mouth 4 times daily (before meals and nightly)  lipase-protease-amylase (CREON) 74879 units delayed release capsule, Take 36,000 Units by mouth 3 times daily (with meals)   metoprolol succinate (TOPROL XL) 25 MG extended release tablet, Take 25 mg by mouth daily  aspirin 81 MG tablet, Take 81 mg by mouth daily  busPIRone (BUSPAR) 15 MG tablet, Take 7.5 mg by mouth 3 times daily  ARIPiprazole (ABILIFY) 5 MG tablet, Take 5 mg by mouth daily  omeprazole (PRILOSEC) 20 MG capsule, Take 40 mg by mouth Daily  (Patient not taking: Reported on 10/13/2021)  lisinopril-hydrochlorothiazide (PRINZIDE;ZESTORETIC) 20-25 MG per tablet, Take 1 tablet by mouth daily. Diet/Nutrition   Diet NPO    Allergies   Patient has no known allergies.     Social History   Tobacco   reports that she has been smoking cigarettes. She has been smoking about 0.50 packs per day. She has never used smokeless tobacco.    Alcohol     reports no history of alcohol use. Occupational history :    Family History   History reviewed. No pertinent family history. ROS     REVIEW OF SYSTEMS:  CONSTITUTIONAL:  negative  EYES:  negative  HEENT:  negative  RESPIRATORY:  negative  CARDIOVASCULAR: Mild chest pain that she states is to the fall. HEMATOLOGIC/LYMPHATIC:  negative  ENDOCRINE:  negative  MUSCULOSKELETAL:  negative  NEUROLOGICAL:  negative        Mechanical Ventilation Data   VENT SETTINGS (Comprehensive)  Vent Information  SpO2: 98 %  Additional Respiratory  Assessments  Pulse: 70  Resp: 23  SpO2: 98 %  Oral Care: Mouth moisturizer    ABG  No results found for: PH, PCO2, PO2, HCO3, O2SAT  No results found for: IFIO2, MODE, SETTIDVOL, SETPEEP        Vitals    height is 5' 1\" (1.549 m) and weight is 175 lb 14.8 oz (79.8 kg). Her axillary temperature is 97.8 °F (36.6 °C). Her blood pressure is 97/63 and her pulse is 70. Her respiration is 23 and oxygen saturation is 98%.        Temperature Range: Temp: 97.8 °F (36.6 °C) Temp  Av.8 °F (36.6 °C)  Min: 97.8 °F (36.6 °C)  Max: 97.9 °F (36.6 °C)  BP Range:  Systolic (47NZE), LSZ:75 , Min:84 , UBN:212     Diastolic (11PDU), PEREZ:79, Min:53, Max:78    Pulse Range: Pulse  Av.4  Min: 67  Max: 79  Respiration Range: Resp  Av.5  Min: 11  Max: 24  Current Pulse Ox[de-identified]  SpO2: 98 %  24HR Pulse Ox Range:  SpO2  Av.1 %  Min: 87 %  Max: 99 %  Oxygen Amount and Delivery: O2 Flow Rate (L/min): 0 L/min      I/O (24 Hours)    Patient Vitals for the past 8 hrs:   BP Temp Temp src Pulse Resp SpO2 Height Weight   21 0700 97/63   70 23 98 %     21 0600 103/78   67 23 94 %     21 0530      97 %     21 0500 98/62   68 11 97 %     21 0400 (!) 84/57 97.8 °F (36.6 °C) Axillary 71 16 98 %     21 0315      98 %     21 0300 98/60   74 21 98 %     12/14/21 0230 98/60 97.9 °F (36.6 °C) Axillary 75 19 99 % 5' 1\" (1.549 m) 175 lb 14.8 oz (79.8 kg)   12/14/21 0046 104/64   79 23 96 %         Intake/Output Summary (Last 24 hours) at 12/14/2021 0720  Last data filed at 12/14/2021 9707  Gross per 24 hour   Intake 1513.44 ml   Output 200 ml   Net 1313.44 ml     I/O last 3 completed shifts: In: 1513.4 [I.V.:1215.4; IV Piggyback:298]  Out: 200 [Urine:200]   Date 12/14/21 0000 - 12/14/21 2359   Shift 0220-8089 7936-8400 1866-4600 24 Hour Total   INTAKE   P.O.(mL/kg/hr) 0   0   I. V.(mL/kg) 1215. 4(15.2)   1215. 4(15.2)   IV Piggyback(mL/kg) 298(3.7)   298(3.7)   Shift Total(mL/kg) 1513.4(19)   1513.4(19)   OUTPUT   Urine(mL/kg/hr) 200   200   Shift Total(mL/kg) 200(2.5)   200(2.5)   Weight (kg) 79.8 79.8 79.8 79.8     Patient Vitals for the past 96 hrs (Last 3 readings):   Weight   12/14/21 0230 175 lb 14.8 oz (79.8 kg)   12/13/21 1607 175 lb (79.4 kg)         Exam     PHYSICAL EXAM:  General appearance -alert and well-appearing  Mental status - alert, oriented to person, place, and time  Eyes - pupils equal and reactive, extraocular eye movements intact  Ears - bilateral TM's and external ear canals normal  Nose - normal and patent, no erythema, discharge or polyps  Mouth - mucous membranes moist, pharynx normal without lesions  Neck - supple, no significant adenopathy  Chest - clear to auscultation, no wheezes, rales or rhonchi, symmetric air entry.   Reproducible chest pain  Heart - normal rate, regular rhythm, normal S1, S2, no murmurs, rubs, clicks or gallops  Abdomen - soft, nontender, nondistended, no masses or organomegaly  Neurological - alert, oriented, normal speech, no focal findings or movement disorder noted}  Extremities - peripheral pulses normal, no pedal edema, no clubbing or cyanosis  Skin - normal coloration and turgor, no rashes, no suspicious skin lesions noted     Data   Old records and images have been reviewed    Lab Results   CBC     Lab Results   Component Value Date    WBC 11.5 12/13/2021    RBC 3.82 12/13/2021    HGB 11.9 12/13/2021    HCT 36.8 12/13/2021     12/13/2021    MCV 96.3 12/13/2021    MCH 31.2 12/13/2021    MCHC 32.3 12/13/2021    RDW 14.6 12/13/2021    LYMPHOPCT 11.6 12/13/2021    MONOPCT 5.4 12/13/2021    BASOPCT 0.3 12/13/2021    MONOSABS 0.78 12/13/2021    LYMPHSABS 1.66 12/13/2021    EOSABS 0.02 12/13/2021    BASOSABS 0.04 12/13/2021       BMP   Lab Results   Component Value Date     12/14/2021    K 3.5 12/14/2021    K 4.3 12/13/2021     12/14/2021    CO2 19 12/14/2021    BUN 47 12/14/2021    CREATININE 1.8 12/14/2021    GLUCOSE 115 12/14/2021    CALCIUM 9.0 12/14/2021       LFTS  Lab Results   Component Value Date    ALKPHOS 100 12/13/2021    ALT 45 12/13/2021    AST 84 12/13/2021    PROT 6.4 12/13/2021    BILITOT 0.3 12/13/2021    BILIDIR <0.2 03/07/2018    IBILI see below 03/07/2018    LABALBU 3.9 12/13/2021       INR  No results for input(s): PROTIME, INR in the last 72 hours. APTT  Recent Labs     12/13/21 2033 12/14/21  0300   APTT 25.5 75.8*       Lactic Acid  Lab Results   Component Value Date    LACTA 3.6 12/14/2021    LACTA 3.8 12/13/2021    LACTA 1.1 10/09/2017        BNP   No results for input(s): BNP in the last 72 hours. Cultures     No results for input(s): BC in the last 72 hours. No results for input(s): Geovanni Puff in the last 72 hours. No results for input(s): LABURIN in the last 72 hours. Radiology   CXR    Impression   Pulmonary vascular congestion and suspect early CHF.  Please correlate   clinically.          CT Scans  Impression   No evidence of pulmonary embolism.       Suspect cardiomegaly and pulmonary vascular congestion.       RECOMMENDATIONS:   Unavailable         SYSTEMS ASSESSMENT    Neuro   Home Cymbalta  No acute issues at this time  Norco 4 times daily as needed    Respiratory   Breathing comfortably on Room air  Wean oxygen as tolerated. Keep O2 sat 90-92%    Cardiovascular   NSTEMI  New RBBB  Troponin 1215 initially  Heparin drip  Echo today  Home lopressor 25 mg qd  Home Lipitor  Monitor CK BMP  Cardiology consulted appreciate recs    Gastrointestinal   Advance diet as able  Protonix 40mg qd    Renal   ZAMZAM  Creatinin 1.8, 1.0 6 months ago   Improved to 1.5     Infectious Disease   Acute cyctitis  Given rocephin in ED, cont  Follow cultures    Hematology/Oncology   Monitor while on Heparin drip  Hemoglobin 10.3  Endocrine   DKA    DKA protocol  Initial anion gap 24, ph 7.29, K 5.0, biacrab 14.   Anion gap closed time 2   Wean to NPH and sliding scale    A1c 6.5  Home synthroid    Patient states she takes nothing at home    Social/Spiritual/DNR/Other   Full code  Patient stable to be discharged to the floor    Dallin Guerra MD  PGY -2    12/14/2021  7:20 AM

## 2021-12-14 NOTE — CARE COORDINATION
States lives independently  With spouse who drives; uses no assistive devices at home. Currently on 02 NC (none at home) reports falls x2 at home d/t weakness; request PT/OT evals when able. Iv heparin gtt. was in DKA on admission;states not on insulin, has all needed supplies at home. ivfSteele Lenora educator to follow. Pt would like to return home at d/c. Cardio/pulm following. cardio indicates no cath needed. Ck 1547. Will follow. Needs at discharge TBD. Ewelina Powell.

## 2021-12-15 LAB
ALBUMIN SERPL-MCNC: 3.2 G/DL (ref 3.5–5.2)
ALP BLD-CCNC: 88 U/L (ref 35–104)
ALT SERPL-CCNC: 40 U/L (ref 0–32)
ANION GAP SERPL CALCULATED.3IONS-SCNC: 14 MMOL/L (ref 7–16)
APTT: 34.4 SEC (ref 24.5–35.1)
APTT: 46 SEC (ref 24.5–35.1)
APTT: 91.9 SEC (ref 24.5–35.1)
AST SERPL-CCNC: 45 U/L (ref 0–31)
BASOPHILS ABSOLUTE: 0.04 E9/L (ref 0–0.2)
BASOPHILS RELATIVE PERCENT: 0.5 % (ref 0–2)
BILIRUB SERPL-MCNC: 0.3 MG/DL (ref 0–1.2)
BUN BLDV-MCNC: 30 MG/DL (ref 6–23)
CALCIUM SERPL-MCNC: 7.8 MG/DL (ref 8.6–10.2)
CHLORIDE BLD-SCNC: 103 MMOL/L (ref 98–107)
CO2: 16 MMOL/L (ref 22–29)
CREAT SERPL-MCNC: 1.1 MG/DL (ref 0.5–1)
EKG ATRIAL RATE: 68 BPM
EKG ATRIAL RATE: 72 BPM
EKG P AXIS: 39 DEGREES
EKG P AXIS: 55 DEGREES
EKG P-R INTERVAL: 190 MS
EKG P-R INTERVAL: 228 MS
EKG Q-T INTERVAL: 490 MS
EKG Q-T INTERVAL: 492 MS
EKG QRS DURATION: 128 MS
EKG QRS DURATION: 134 MS
EKG QTC CALCULATION (BAZETT): 521 MS
EKG QTC CALCULATION (BAZETT): 538 MS
EKG R AXIS: 52 DEGREES
EKG R AXIS: 76 DEGREES
EKG T AXIS: 18 DEGREES
EKG T AXIS: 51 DEGREES
EKG VENTRICULAR RATE: 68 BPM
EKG VENTRICULAR RATE: 72 BPM
EOSINOPHILS ABSOLUTE: 0.23 E9/L (ref 0.05–0.5)
EOSINOPHILS RELATIVE PERCENT: 2.6 % (ref 0–6)
GFR AFRICAN AMERICAN: 59
GFR NON-AFRICAN AMERICAN: 48 ML/MIN/1.73
GLUCOSE BLD-MCNC: 95 MG/DL (ref 74–99)
HCT VFR BLD CALC: 34.1 % (ref 34–48)
HEMOGLOBIN: 11.1 G/DL (ref 11.5–15.5)
IMMATURE GRANULOCYTES #: 0.04 E9/L
IMMATURE GRANULOCYTES %: 0.5 % (ref 0–5)
LYMPHOCYTES ABSOLUTE: 2.42 E9/L (ref 1.5–4)
LYMPHOCYTES RELATIVE PERCENT: 27.6 % (ref 20–42)
MAGNESIUM: 1.9 MG/DL (ref 1.6–2.6)
MCH RBC QN AUTO: 30.7 PG (ref 26–35)
MCHC RBC AUTO-ENTMCNC: 32.6 % (ref 32–34.5)
MCV RBC AUTO: 94.5 FL (ref 80–99.9)
METER GLUCOSE: 124 MG/DL (ref 74–99)
METER GLUCOSE: 158 MG/DL (ref 74–99)
METER GLUCOSE: 202 MG/DL (ref 74–99)
METER GLUCOSE: 98 MG/DL (ref 74–99)
MONOCYTES ABSOLUTE: 0.66 E9/L (ref 0.1–0.95)
MONOCYTES RELATIVE PERCENT: 7.5 % (ref 2–12)
MRSA CULTURE ONLY: NORMAL
NEUTROPHILS ABSOLUTE: 5.38 E9/L (ref 1.8–7.3)
NEUTROPHILS RELATIVE PERCENT: 61.3 % (ref 43–80)
PDW BLD-RTO: 14.5 FL (ref 11.5–15)
PHOSPHORUS: 2.5 MG/DL (ref 2.5–4.5)
PLATELET # BLD: 192 E9/L (ref 130–450)
PMV BLD AUTO: 10.5 FL (ref 7–12)
POTASSIUM SERPL-SCNC: 3.1 MMOL/L (ref 3.5–5)
PROCALCITONIN: 0.89 NG/ML (ref 0–0.08)
RBC # BLD: 3.61 E12/L (ref 3.5–5.5)
REASON FOR REJECTION: NORMAL
REJECTED TEST: NORMAL
SODIUM BLD-SCNC: 133 MMOL/L (ref 132–146)
TOTAL PROTEIN: 5.7 G/DL (ref 6.4–8.3)
TROPONIN, HIGH SENSITIVITY: 204 NG/L (ref 0–9)
WBC # BLD: 8.8 E9/L (ref 4.5–11.5)

## 2021-12-15 PROCEDURE — 6360000002 HC RX W HCPCS: Performed by: INTERNAL MEDICINE

## 2021-12-15 PROCEDURE — 84145 PROCALCITONIN (PCT): CPT

## 2021-12-15 PROCEDURE — 2700000000 HC OXYGEN THERAPY PER DAY

## 2021-12-15 PROCEDURE — 6370000000 HC RX 637 (ALT 250 FOR IP): Performed by: INTERNAL MEDICINE

## 2021-12-15 PROCEDURE — 80053 COMPREHEN METABOLIC PANEL: CPT

## 2021-12-15 PROCEDURE — 97165 OT EVAL LOW COMPLEX 30 MIN: CPT

## 2021-12-15 PROCEDURE — 99233 SBSQ HOSP IP/OBS HIGH 50: CPT | Performed by: INTERNAL MEDICINE

## 2021-12-15 PROCEDURE — 84484 ASSAY OF TROPONIN QUANT: CPT

## 2021-12-15 PROCEDURE — 93010 ELECTROCARDIOGRAM REPORT: CPT | Performed by: INTERNAL MEDICINE

## 2021-12-15 PROCEDURE — 84100 ASSAY OF PHOSPHORUS: CPT

## 2021-12-15 PROCEDURE — 83735 ASSAY OF MAGNESIUM: CPT

## 2021-12-15 PROCEDURE — 2580000003 HC RX 258: Performed by: INTERNAL MEDICINE

## 2021-12-15 PROCEDURE — 36415 COLL VENOUS BLD VENIPUNCTURE: CPT

## 2021-12-15 PROCEDURE — 85730 THROMBOPLASTIN TIME PARTIAL: CPT

## 2021-12-15 PROCEDURE — 82962 GLUCOSE BLOOD TEST: CPT

## 2021-12-15 PROCEDURE — 2060000000 HC ICU INTERMEDIATE R&B

## 2021-12-15 PROCEDURE — 85025 COMPLETE CBC W/AUTO DIFF WBC: CPT

## 2021-12-15 RX ORDER — DEXTROSE MONOHYDRATE 25 G/50ML
12.5 INJECTION, SOLUTION INTRAVENOUS PRN
Status: DISCONTINUED | OUTPATIENT
Start: 2021-12-15 | End: 2021-12-20 | Stop reason: HOSPADM

## 2021-12-15 RX ORDER — NICOTINE POLACRILEX 4 MG
15 LOZENGE BUCCAL PRN
Status: DISCONTINUED | OUTPATIENT
Start: 2021-12-15 | End: 2021-12-20 | Stop reason: HOSPADM

## 2021-12-15 RX ORDER — ATORVASTATIN CALCIUM 40 MG/1
40 TABLET, FILM COATED ORAL NIGHTLY
Status: DISCONTINUED | OUTPATIENT
Start: 2021-12-15 | End: 2021-12-20 | Stop reason: HOSPADM

## 2021-12-15 RX ORDER — DEXTROSE MONOHYDRATE 50 MG/ML
100 INJECTION, SOLUTION INTRAVENOUS PRN
Status: DISCONTINUED | OUTPATIENT
Start: 2021-12-15 | End: 2021-12-20 | Stop reason: HOSPADM

## 2021-12-15 RX ORDER — TRAMADOL HYDROCHLORIDE 50 MG/1
50 TABLET ORAL EVERY 6 HOURS PRN
Status: DISCONTINUED | OUTPATIENT
Start: 2021-12-15 | End: 2021-12-20 | Stop reason: HOSPADM

## 2021-12-15 RX ADMIN — ASPIRIN 81 MG: 81 TABLET, COATED ORAL at 09:33

## 2021-12-15 RX ADMIN — INSULIN LISPRO 1 UNITS: 100 INJECTION, SOLUTION INTRAVENOUS; SUBCUTANEOUS at 21:25

## 2021-12-15 RX ADMIN — HYDROCODONE BITARTRATE AND ACETAMINOPHEN 1 TABLET: 5; 325 TABLET ORAL at 18:53

## 2021-12-15 RX ADMIN — HYDROCODONE BITARTRATE AND ACETAMINOPHEN 1 TABLET: 5; 325 TABLET ORAL at 05:21

## 2021-12-15 RX ADMIN — LEVOTHYROXINE SODIUM 75 MCG: 75 TABLET ORAL at 05:08

## 2021-12-15 RX ADMIN — ATORVASTATIN CALCIUM 40 MG: 40 TABLET, FILM COATED ORAL at 21:25

## 2021-12-15 RX ADMIN — PANCRELIPASE 36000 UNITS: 60000; 12000; 38000 CAPSULE, DELAYED RELEASE PELLETS ORAL at 12:14

## 2021-12-15 RX ADMIN — Medication 1000 MG: at 09:28

## 2021-12-15 RX ADMIN — HYDROCODONE BITARTRATE AND ACETAMINOPHEN 1 TABLET: 5; 325 TABLET ORAL at 12:14

## 2021-12-15 RX ADMIN — HEPARIN SODIUM AND DEXTROSE 16 UNITS/KG/HR: 10000; 5 INJECTION INTRAVENOUS at 07:00

## 2021-12-15 RX ADMIN — METOPROLOL TARTRATE 25 MG: 25 TABLET, FILM COATED ORAL at 09:27

## 2021-12-15 RX ADMIN — HEPARIN SODIUM 4000 UNITS: 1000 INJECTION INTRAVENOUS; SUBCUTANEOUS at 06:59

## 2021-12-15 RX ADMIN — DULOXETINE HYDROCHLORIDE 60 MG: 60 CAPSULE, DELAYED RELEASE ORAL at 09:27

## 2021-12-15 RX ADMIN — INSULIN HUMAN 10 UNITS: 100 INJECTION, SUSPENSION SUBCUTANEOUS at 09:34

## 2021-12-15 RX ADMIN — PANCRELIPASE 36000 UNITS: 60000; 12000; 38000 CAPSULE, DELAYED RELEASE PELLETS ORAL at 16:48

## 2021-12-15 RX ADMIN — INSULIN HUMAN 10 UNITS: 100 INJECTION, SUSPENSION SUBCUTANEOUS at 16:45

## 2021-12-15 RX ADMIN — HEPARIN SODIUM 2000 UNITS: 1000 INJECTION INTRAVENOUS; SUBCUTANEOUS at 22:44

## 2021-12-15 RX ADMIN — INSULIN LISPRO 2 UNITS: 100 INJECTION, SOLUTION INTRAVENOUS; SUBCUTANEOUS at 12:20

## 2021-12-15 RX ADMIN — PANTOPRAZOLE SODIUM 40 MG: 40 TABLET, DELAYED RELEASE ORAL at 05:08

## 2021-12-15 RX ADMIN — POTASSIUM BICARBONATE 40 MEQ: 782 TABLET, EFFERVESCENT ORAL at 09:27

## 2021-12-15 ASSESSMENT — PAIN SCALES - GENERAL
PAINLEVEL_OUTOF10: 8
PAINLEVEL_OUTOF10: 5
PAINLEVEL_OUTOF10: 7
PAINLEVEL_OUTOF10: 8
PAINLEVEL_OUTOF10: 5
PAINLEVEL_OUTOF10: 5

## 2021-12-15 NOTE — PLAN OF CARE
Patient's chart updated to reflect:      . - HF care plan, HF education points and HF discharge instructions.  -Orders: 2 gram sodium diet, daily weights, I/O.  -PCP and cardiology follow up appointments to be scheduled within 7 days of hospital discharge. -CHF education session will be provided to the patient prior to hospital discharge.     Santino Cummins RN BSN  Heart Failure Navigator

## 2021-12-15 NOTE — PROGRESS NOTES
Subjective:    Chief complaint:    Ore alert  Complaining of chest and neck pain  She does have a history of chronic neck pain      Objective:    /72   Pulse 76   Temp 97.6 °F (36.4 °C) (Oral)   Resp 18   Ht 5' 1\" (1.549 m)   Wt 187 lb 4.8 oz (85 kg)   SpO2 98%   BMI 35.39 kg/m²   General : Awake ,alert,no distress. Heart:  RRR, no murmurs, gallops, or rubs. Lungs:  CTA bilaterally, no wheeze, rales or rhonchi  Abd: bowel sounds present, nontender, nondistended, no masses  Extrem:  No clubbing, cyanosis, or edema    CBC:   Lab Results   Component Value Date    WBC 8.8 12/15/2021    RBC 3.61 12/15/2021    HGB 11.1 12/15/2021    HCT 34.1 12/15/2021    MCV 94.5 12/15/2021    MCH 30.7 12/15/2021    MCHC 32.6 12/15/2021    RDW 14.5 12/15/2021     12/15/2021    MPV 10.5 12/15/2021     BMP:    Lab Results   Component Value Date     12/15/2021    K 3.1 12/15/2021    K 4.3 12/13/2021     12/15/2021    CO2 16 12/15/2021    BUN 30 12/15/2021    LABALBU 3.2 12/15/2021    CREATININE 1.1 12/15/2021    CALCIUM 7.8 12/15/2021    GFRAA 59 12/15/2021    LABGLOM 48 12/15/2021    GLUCOSE 95 12/15/2021     PT/INR:    Lab Results   Component Value Date    PROTIME 11.1 06/14/2021    INR 1.0 06/14/2021     Troponin:    Lab Results   Component Value Date    TROPONINI <0.01 07/09/2019       No results for input(s): LABURIN in the last 72 hours.   Recent Labs     12/13/21 2102   BC 24 Hours no growth     Recent Labs     12/13/21 2102   BLOODCULT2 24 Hours no growth         Current Facility-Administered Medications:     atorvastatin (LIPITOR) tablet 40 mg, 40 mg, Oral, Nightly, Jennifer Yu MD    cefTRIAXone (ROCEPHIN) 1,000 mg in sodium chloride flush 10 mL IV syringe, 1,000 mg, IntraVENous, Q24H, Martin Arredondo MD, 1,000 mg at 12/15/21 0928    HYDROcodone-acetaminophen (NORCO) 5-325 MG per tablet 1 tablet, 1 tablet, Oral, TID PRN, Martin Arredondo MD, 1 tablet at 12/15/21 0521    DULoxetine (CYMBALTA) extended release capsule 60 mg, 60 mg, Oral, Daily, Alena Fisher MD, 60 mg at 12/15/21 0263    metoprolol tartrate (LOPRESSOR) tablet 25 mg, 25 mg, Oral, BID, Alena Fisher MD, 25 mg at 12/15/21 7206    levothyroxine (SYNTHROID) tablet 75 mcg, 75 mcg, Oral, Daily, Alena Fisher MD, 75 mcg at 12/15/21 0508    pantoprazole (PROTONIX) tablet 40 mg, 40 mg, Oral, QAM AC, Alena Fisher MD, 40 mg at 12/15/21 2657    hydrOXYzine (VISTARIL) capsule 25 mg, 25 mg, Oral, TID PRN, Alena Fisher MD, 25 mg at 12/14/21 1336    tiZANidine (ZANAFLEX) tablet 4 mg, 4 mg, Oral, Q12H PRN, Alena Fisher MD, 4 mg at 12/14/21 1541    aspirin EC tablet 81 mg, 81 mg, Oral, Daily, Alena Fisher MD, 81 mg at 12/15/21 0933    insulin NPH (HUMULIN N;NOVOLIN N) injection vial 10 Units, 10 Units, SubCUTAneous, BID AC, Alena Fisher MD, 10 Units at 12/14/21 1652    insulin lispro (HUMALOG) injection vial 0-6 Units, 0-6 Units, SubCUTAneous, TID WC, Alena Fisher MD, 1 Units at 12/14/21 1652    insulin lispro (HUMALOG) injection vial 0-3 Units, 0-3 Units, SubCUTAneous, Nightly, Alena Fisher MD    heparin (porcine) injection 4,000 Units, 4,000 Units, IntraVENous, PRN, Alena Fisher MD, 4,000 Units at 12/15/21 0659    heparin (porcine) injection 2,000 Units, 2,000 Units, IntraVENous, PRN, Alena Fisher MD    heparin 25,000 units in dextrose 5% 250 mL (premix) infusion, 5-30 Units/kg/hr, IntraVENous, Continuous, Alena Fisher MD, Last Rate: 12.7 mL/hr at 12/15/21 0700, 16 Units/kg/hr at 12/15/21 0700    dextrose 50 % IV solution, 12.5 g, IntraVENous, PRN, Alena Fisher MD    0.45 % sodium chloride infusion, , IntraVENous, Continuous, Joy Ramos MD, Last Rate: 75 mL/hr at 12/15/21 0929, Rate Change at 12/15/21 6903    ADULT DIET; Regular; 4 carb choices (60 gm/meal);  Low Sodium (2 gm)  Diet NPO    CT ABDOMEN PELVIS WO CONTRAST Additional Contrast? None   Final Result   No acute process in the abdomen and pelvis. Probable cirrhotic liver with hemangioma in the left lobe. 2 cm left renal cyst.      Constipation. Fat containing ventral hernia, upper abdomen. Bibasilar atelectasis, lung bases. RECOMMENDATIONS:   Unavailable         CT HEAD WO CONTRAST   Final Result   No acute intracranial abnormality. RECOMMENDATIONS:   Unavailable         CT CERVICAL SPINE WO CONTRAST   Final Result   No acute abnormality of the cervical spine. Multilevel degenerative changes, as noted above. RECOMMENDATIONS:   Unavailable         CTA CHEST W CONTRAST   Final Result   No evidence of pulmonary embolism. Suspect cardiomegaly and pulmonary vascular congestion. RECOMMENDATIONS:   Unavailable         XR CHEST PORTABLE   Final Result   Pulmonary vascular congestion and suspect early CHF. Please correlate   clinically. XR PELVIS (1-2 VIEWS)   Final Result   No acute bony abnormality. Assessment:    Active Problems:    NSTEMI (non-ST elevated myocardial infarction) ([address]  Resolved Problems:    * No resolved hospital problems.  *  LV systolic dysfunction  Reviewed creatinine improved  Hypokalemia  Chronic  Pain syndrome  Underlying history of anxiety/depression  DKA resolved  Diabetes mellitus type 2  Transaminitis -LFTs improving  metabolic encephalopathy  Possible UTI    Plan:    Echocardiogram discussed with Dr. Leonard Taylor  EF 40 to 45% with inferior wall severely hypokinetic  She will need heart catheterization  We will ask renal to be involved given need for more contrast and recent acute kidney injury  Potassium has been replaced by cardiology  Await troponin levels  Heart catheter sooner if the troponins are trending up  Await urine culture check procalcitonin  Continue Rocephin  Patient looks like she could benefit from diuretics however given upcoming heart catheterization and possible dye load will hold off  Continue gentle hydration for now  Avoid hydrocodone given her confusion and issues  Try tramadol for pain issues      Norma Barajas MD  11:24 AM  12/15/2021    NOTE: This report was transcribed using voice recognition software.  Every effort was made to ensure accuracy; however, inadvertent transcription errors may be present
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
## 2021-12-15 NOTE — CONSULTS
Patient currently admitted with diagnosis of Diastolic heart failure. Patient was awake and alert, laying in bed during the consultation. She was engaged and asked appropriate questions throughout the education session. She is agreeable to heart failure education and follow up in the heart failure clinic after discharge. Future Appointments   Date Time Provider Rocio Pacheco   12/16/2021  9:00 AM [facility] CVL 01 [facility]            We reviewed the introduction to Heart Failure, the HF zones, signs and symptoms to report on day 1 of onset, medications, medication compliance, the importance of obtaining daily weights, following a low sodium diet, reading food labels for the sodium content, keeping physician appointments, and smoking cessation. We discussed writing / tracking daily weights on a calendar / log. You can also take your charted weights to your doctor appointments to be reviewed. Contributing risk factors for Heart Failure are identified as fall, weakness, and shortness of breath. I advised patient they can reduce the risk for Heart Failure exacerbations by modifying / controlling the risk factors. We discussed self-managed care which includes the following:  to take medications as prescribed, report any intolerable side effects of medications to the cardiologist / doctor, do not just stop taking the medication; follow a cardiac heart healthy / low sodium diet; weigh yourself daily, exercise regularly- per doctor recommendation and not to smoke or use an excess amount of alcohol. We discussed calling the cardiologist / doctor with a weight gain of 3 pounds in one day or a total of 5 pounds or more in one week. Also, if you should have a significant weight loss of 3# or more in one day to call the doctor, they may need to decrease or hold the diuretic dose.  On days you feels nauseated and not eating / drinking, having emesis or diarrhea,  informed to call the cardiologist  / patient education addressing:  · What is Heart Failure? · Things You Can Do to Live Well with HF  · How to Take Your Medications  · How to Eat Less Salt  · Exercising Well with Heart Failure  · Signs and symptoms of HF to report  · Weight Yourself Each Day  · Home Self Management- activity, weight tracking, taking medications as prescribed, meals /diet planning (sodium and fluid restriction), how to read food labels, keeping physician follow ups, smoking cessation, follow the Heart Failure Zones  · The Heart Failure zones  · Sodium content pamphlet  · What foods I should avoid tip sheet    Instructed  to call 911 if you have any of the following symptoms:    ·    Struggling to breathe unrelieved with rest,  ·    Having chest pain, confusion or can't think clearly  ·    Have confusion or cant think clearly    Readmission Risk Score: 16.7 ( )    Discharge Plan:  I placed the Heart Failure Home Instructions in patient's discharge instructions. Per Heart Failure GWTG, the patient should have a follow-up appointment made within 7 days of discharge. Alex Vega RN BSN  Heart Failure Navigator    CONGESTIVE HEART FAILURE (CHF) GUIDELINES  (Must be completed for Primary Diagnosis CHF or History of CHF)    Type of CHF:    [x] Acute   [] Chronic     [] Acute on Chronic     Ventricular Function Assessment (check):        [] HFpEF  [] HFpEF, borderline  [] HFpEF, improved  [x] HFrEF    Echocardiogram: 12/14/2021   Ejection Fraction (%):  EF 40-45 %                                                         Angiotensin-Converting-Enzyme (ACE) inhibitor ordered:  [x] Yes  [] No (specify contraindication):  [] Renal Insufficiency  [] Cough  [] Hypotension  [] Allergy/angioedema  [] No left ventricular systolic dysfunction (LVSD)  [] Hyperkalemia  [] Moderate to severe aortic stenosis  [] Other (Specify):     Angiotensin II receptor blockers (ARB) ordered:  [] Yes  [x] No (specify contraindication):  [x] Renal Insufficiency  [] Hypotension  [] Allergy/angioedema  [] No LVSD  [] Hyperkalemia  [] Moderate to severe aortic stenosis  [] Other (Specify):    ARNI - Angiotensin Receptor Neprilysin Inhibitor ordered:  [] Yes  [x] No    ACC/AHA Guidelines Beta Blocker (Carvedilol, Metoprolol Succinate, or Bisoprolol) ordered:    [x] Yes- Toprol XL   [] No (specify contraindication):  [] Bradycardia  [] Hypotension  [] LVD  [] 2nd or 3rd degree heart block  [] Bronchospastic airway disease  [] Decompensated CHF  [] Other (Specify):

## 2021-12-15 NOTE — PROGRESS NOTES
Occupational Therapy  OCCUPATIONAL THERAPY INITIAL EVALUATION      Date:12/15/2021  Patient Name: Carl Dunbar  MRN: [id number]  : 1945  Room: [address] [place], [place] [address]    OCCUPATIONAL THERAPY INITIAL EVALUATION    [address] [place], [place]         HWSJ:                                                  Patient Name: Carl Dunbar    MRN: [id number]    : 1945    [facility]      Evaluating OT: Carisa Vizcaino OTR/L   [id number]      Referring Cailin Duffy MD    Specific Provider Orders/Date:OT eval and treat       Diagnosis:  Lactic acidosis [E87.2]  NSTEMI (non-ST elevated myocardial infarction) ([facility] [I21.4]  Acute respiratory failure with hypoxia ([address] [J96.01]  Acute on chronic combined systolic and diastolic CHF (congestive heart failure) ([address] [I50.43]  Traumatic rhabdomyolysis, initial encounter ([facility] [facility] Murleen Nicola. 6XXA]  Diabetic ketoacidosis without coma associated with type 2 diabetes mellitus ([address] [E11.10]  New onset right bundle branch block (RBBB) [I45.10]  Chronic kidney disease, unspecified CKD stage [N18.9]     Pertinent Medical History: DM, HTN    Precautions:  Fall Risk, alarm      Assessment of current deficits    [x] Functional mobility  [x]ADLs  [x] Strength               []Cognition    [x] Functional transfers   [x] IADLs         [x] Safety Awareness   [x]Endurance    [] Fine Coordination              [x] Balance      [] Vision/perception   [x]Sensation     []Gross Motor Coordination  [] ROM  [] Delirium                   [] Motor Control     OT PLAN OF CARE   OT POC based on physician orders, patient diagnosis and results of clinical assessment    Frequency/Duration  2-4 days/wk for 2 weeks PRN   Specific OT Treatment Interventions to include:   ADL retraining/adapted techniques and AE recommendations to increase functional independence within precautions                    Energy conservation techniques to improve tolerance for selfcare routine   Functional transfer/mobility training/DME recommendations for increased independence, safety and fall prevention         Patient/family education to increase safety and functional independence             Environmental modifications for safe mobility and completion of ADLs                             Therapeutic activity to improve functional performance during ADLs. Therapeutic exercise to improve tolerance and functional strength for ADLs    Balance retraining/tolerance tasks for facilitation of postural control with dynamic challenges during ADLs .       Positioning to improve functional independence  []  Recommended Adaptive Equipment: TBD     Home Living: Pt lives with , 1 story with 4 steps/rail     Prior Level of Function: Independent  with ADLs , Independent  with IADLs; ambulated with no device   Driving: yes     Pain Level: suffocating chest pain ; nurse made aware   Cognition: A&O: 4/4;    Memory:  Good    Sequencing:  Good    Problem solving:  Fair+    Judgement/safety:  Fair +      Functional Assessment:  AM-PAC Daily Activity Raw Score: 15/24   Initial Eval Status  Date: 12/15/21 Treatment Status  Date: STGs = LTGs  Time frame: 10-14 days   Feeding set-up   Independent    Grooming SBA/set-up   Mod I    UB Dressing Min A   Supervision    LB Dressing Mod A   Supervision    Bathing Mod A   Supervision    Toileting Assist with thorough hygiene   Supervision    Bed Mobility  Mod A   Supine <> sit   supevision    Functional Transfers Min A  Sit-stand from bed   Supervision    Functional Mobility Min A,w/walker  Steps to/from bed only   - patient returned to bed - d/t chest pain (nurse made aware)  O2 sats 95%  HR 70   On 2 L o2  Supervision  with good tolerance    Balance Sitting:     Static:  SBA     Dynamic:Mod a   Standing: Sary   Supervision    Activity Tolerance Fair - with light activity   Good  with ADL activity    Visual/  Perceptual Glasses: none by bedside                 Hand Dominance right    AROM (PROM) Strength Additional Info:    RUE  WFL    Hand tremors noted  WFL good  and wfl FMC/dexterity noted during ADL tasks       LUE WFL WFL good  and wfl FMC/dexterity noted during ADL tasks       Hearing: WFL   Sensation:  No c/o numbness or tingling   Tone: WFL   Edema: none observed     Comments: Upon arrival patient lying in bed . At end of session, patient returned to bed  with call light and phone within reach, all lines and tubes intact. *ALARM ON      Overall patient demonstrated  decreased independence and safety during completion of ADL/functional transfer/mobility tasks. Pt would benefit from continued skilled OT to increase safety and independence with completion of ADL/IADL tasks for functional independence and quality of life. Rehab Potential: good for established goals     Patient / Family Goal: return home       Patient and/or family were instructed on functional diagnosis, prognosis/goals and OT plan of care. Demonstrated good  understanding. Eval Complexity: Low    Time In: 1000  Time Out: 1017      Min Units   OT Eval Low 97165 x  1   OT Eval Medium 92905      OT Eval High 33517      OT Re-Eval [id number]       Therapeutic Ex 77573       Therapeutic Activities 04196       ADL/Self Care 56417       Orthotic Management 76332       Manual [id number]     Neuro Re-Ed 20584       Non-Billable Time          Evaluation Time additionally includes thorough review of current medical information, gathering information on past medical history/social history and prior level of function, interpretation of standardized testing/informal observation of tasks, assessment of data and development of plan of care and goals.             Pippa Lagunas  OTR/L  OT [id number]

## 2021-12-15 NOTE — PROGRESS NOTES
INPATIENT CARDIOLOGY FOLLOW-UP    Name: Jose Sheehan    Age: 78 y.o. Date of Admission: 12/13/2021  3:18 PM    Date of Service: 12/15/2021     Follow-up:  NSTEMI  Interim History:  No new overnight cardiac complaints. Currently with no complaints of CP, SOB, palpitations, dizziness, or lightheadedness. SR on telemetry.     Review of Systems:   Cardiac: As per HPI  General: No fever, chills  Pulmonary: As per HPI  HEENT: No visual disturbances, difficult swallowing  GI: No nausea, vomiting  Endocrine: No thyroid disease or DM  Musculoskeletal: MAYO x 4, no focal motor deficits  Skin: Intact, no rashes  Neuro/Psych: No headache or seizures    Problem List:  Patient Active Problem List   Diagnosis    Subcutaneous mass    Diabetes mellitus ([facility]    Syncope and collapse    Severe protein-calorie malnutrition ([facility]    NSTEMI (non-ST elevated myocardial infarction) ([facility]       Allergies:  No Known Allergies    Current Medications:  Current Facility-Administered Medications   Medication Dose Route Frequency Provider Last Rate Last Admin    cefTRIAXone (ROCEPHIN) 1,000 mg in sodium chloride flush 10 mL IV syringe  1,000 mg IntraVENous Q24H Philip Rivas MD   1,000 mg at 12/14/21 0827    HYDROcodone-acetaminophen ([address]) 5-325 MG per tablet 1 tablet  1 tablet Oral TID PRN Philip Rivas MD   1 tablet at 12/15/21 0521    DULoxetine (CYMBALTA) extended release capsule 60 mg  60 mg Oral Daily Philip Rivas MD   60 mg at 12/14/21 1049    metoprolol tartrate (LOPRESSOR) tablet 25 mg  25 mg Oral BID Philip Rivas MD   25 mg at 12/14/21 2134    levothyroxine (SYNTHROID) tablet 75 mcg  75 mcg Oral Daily Philip Rivas MD   75 mcg at 12/15/21 0508    pantoprazole (PROTONIX) tablet 40 mg  40 mg Oral QAM AC Philip Rivas MD   40 mg at 12/15/21 9826    hydrOXYzine (VISTARIL) capsule 25 mg  25 mg Oral TID PRN Philip Rivas MD   25 mg at 12/14/21 1336    tiZANidine (ZANAFLEX) tablet 4 mg  4 mg Oral Q12H PRN Tootie Wheatley MD   4 mg at 12/14/21 1541    aspirin EC tablet 81 mg  81 mg Oral Daily Tootie Wheatley MD   81 mg at 12/14/21 0958    atorvastatin (LIPITOR) tablet 20 mg  20 mg Oral Nightly Tootie Wheatley MD   20 mg at 12/14/21 2134    insulin NPH (HUMULIN N;NOVOLIN N) injection vial 10 Units  10 Units SubCUTAneous BID AC Tootie Wheatley MD   10 Units at 12/14/21 1652    insulin lispro (HUMALOG) injection vial 0-6 Units  0-6 Units SubCUTAneous TID WC Tootie Wheatley MD   1 Units at 12/14/21 1652    insulin lispro (HUMALOG) injection vial 0-3 Units  0-3 Units SubCUTAneous Nightly Tootie Wheatley MD        heparin (porcine) injection 4,000 Units  4,000 Units IntraVENous PRN Tootie Wheatley MD   4,000 Units at 12/15/21 0659    heparin (porcine) injection 2,000 Units  2,000 Units IntraVENous PRN Tootie Wheatley MD        heparin 25,000 units in dextrose 5% 250 mL (premix) infusion  5-30 Units/kg/hr IntraVENous Continuous Tootie Wheatley MD 12.7 mL/hr at 12/15/21 0700 16 Units/kg/hr at 12/15/21 0700    dextrose 50 % IV solution  12.5 g IntraVENous PRN Tootie Wheatley MD        0.45 % sodium chloride infusion   IntraVENous Continuous Tootie Wheatley  mL/hr at 12/14/21 1126 New Bag at 12/14/21 1126      heparin (PORCINE) Infusion 16 Units/kg/hr (12/15/21 0700)    sodium chloride 100 mL/hr at 12/14/21 1126       Physical Exam:  /67   Pulse 63   Temp 97.7 °F (36.5 °C) (Oral)   Resp 18   Ht 5' 1\" (1.549 m)   Wt 187 lb 4.8 oz (85 kg)   SpO2 97%   BMI 35.39 kg/m²   Wt Readings from Last 3 Encounters:   12/15/21 187 lb 4.8 oz (85 kg)   10/13/21 169 lb 11.2 oz (77 kg)   06/15/21 170 lb (77.1 kg)     Appearance: Awake, alert, no acute respiratory distress  Skin: Intact, no rash  Head: Normocephalic, atraumatic  Eyes: EOMI, no conjunctival erythema  ENMT: No pharyngeal erythema, MMM, no rhinorrhea  Neck: Supple, no elevated JVP, no carotid bruits  Lungs: Clear to auscultation bilaterally. No wheezes, rales, or rhonchi. Cardiac: Regular rate and rhythm, +S1S2, no murmurs apparent  Abdomen: Soft, nontender, +bowel sounds  Extremities: Moves all extremities x 4, no lower extremity edema  Neurologic: No focal motor deficits apparent, normal mood and affect  Peripheral Pulses: Intact posterior tibial pulses bilaterally    Intake/Output:    Intake/Output Summary (Last 24 hours) at 12/15/2021 0730  Last data filed at 12/14/2021 1442  Gross per 24 hour   Intake 1529.56 ml   Output 300 ml   Net 1229.56 ml     No intake/output data recorded.     Laboratory Tests:  Recent Labs     12/14/21  0146 12/14/21  0600 12/15/21  0530    138 133   K 3.5 3.5 3.1*    107 103   CO2 19* 16* 16*   BUN 47* 43* 30*   CREATININE 1.8* 1.5* 1.1*   GLUCOSE 115* 184* 95   CALCIUM 9.0 7.5* 7.8*     Lab Results   Component Value Date    MG 1.9 12/15/2021     Recent Labs     12/13/21  1631 12/13/21  2033 12/15/21  0530   ALKPHOS 113* 100 88   ALT 52* 45* 40*   AST 94* 84* 45*   PROT 6.9 6.4 5.7*   BILITOT 0.4 0.3 0.3   LABALBU 4.1 3.9 3.2*     Recent Labs     12/13/21  2033 12/14/21  0600 12/15/21  0530   WBC 11.5 10.4 8.8   RBC 3.82 3.28* 3.61   HGB 11.9 10.3* 11.1*   HCT 36.8 31.8* 34.1   MCV 96.3 97.0 94.5   MCH 31.2 31.4 30.7   MCHC 32.3 32.4 32.6   RDW 14.6 14.6 14.5    162 192   MPV 10.1 10.7 10.5     Lab Results   Component Value Date    CKTOTAL 1,547 (H) 12/13/2021    CKMB 3.8 08/19/2015    TROPONINI <0.01 07/09/2019    TROPONINI <0.01 02/25/2017    TROPONINI 0.01 08/19/2015     Lab Results   Component Value Date    INR 1.0 06/14/2021    INR 1.2 07/09/2019    INR 1.0 08/19/2015    PROTIME 11.1 06/14/2021    PROTIME 14.1 (H) 07/09/2019    PROTIME 11.2 08/19/2015     Lab Results   Component Value Date    TSH 1.480 06/15/2021     Lab Results   Component Value Date    LABA1C 6.5 (H) 12/14/2021     No results found for: EAG  Lab Results   Component Value Date    CHOL 164 11/13/2015     Lab Results Component Value Date    TRIG 52 11/13/2015     Lab Results   Component Value Date    HDL 77 11/13/2015     Lab Results   Component Value Date    LDLCALC 77 11/13/2015     Lab Results   Component Value Date    LABVLDL 10 11/13/2015     No results found for: CHOLHDLRATIO    Cardiac Tests:  Telemetry findings reviewed: SR at rate 80s, no new tachy/bradyarrhythmias overnight     EKG: Normal sinus rhythm, first-degree block, right bundle branch block, poor R wave progression, abnormal EKG.    Vitals and labs were reviewed: Blood pressure 112/72 heart rate 76 O2 sats 98% on 2 L.     LabsBUNs/creatinine 49/1.8 (baseline 1.2)>> 43/1.5>>30/1.1, K 3.1 electrolytes normal.  Lactic acid 3.8>> 2.8, total CK 1547, proBNP 50,183>>09444, troponin, high-sensitivity 1215> 964, AST/ALT 84/45. Beta hydroxybutyrate 1.98, A1c 6.5 H&H 11.9/36.8>> 10.3/31.8>>11.1/34.1. Urinalysis was positive for trace amount of ketones moderate blood small amount of leukocyte Estrace nitrates negative WBC 10-20 RBC 10-20 moderate bacteria.     12/13/2021 CXR:  Pulmonary vascular congestion suspect early CHF.  Please correlate clinically.     12/13/2021 Pelvic XRay:  No acute bony abnormality.     12/13/2021 CT Head:  No acute intercranial abnormality.     12/13/2021 CT of Cervical Spine:  No acute abnormality of the cervical spine. Multilevel degenerative changes, as noted above      12/13/2021 CTA Chest:  No evidence of pulmonary embolism. Suspect cardiomegaly and pulmonary vascular congestion.     12/13/2021 CT of Abdomen and Pelvis:  No acute process in the abdomen and pelvis  Probable cirrhotic liver with hemangioma in the left lobe. 2 cm left renal cyst.  Constipation  Fat-containing ventral hernia, upper abdomen  Bibasilar atelectasis, lung bases.       TTE- 12/14/2021:   Left ventricle size is normal.   Ejection fraction is visually estimated at 40-45%. Overall ejection fraction mild-to-moderately decreased .    Mid to basal inferolateral and inferior walls are severely hypokinetic. Normal left ventricle wall thickness. Stage I diastolic dysfunction. Mildly dilated right ventricle. Right ventricle global systolic function is mildly reduced . TAPSE 14 mm. No hemodynamically significant aortic stenosis is present. Mild to moderate tricuspid regurgitation. RVSP is 38 mmHg. Normal estimated PA systolic pressure. No evidence for hemodynamically significant pericardial effusion. Technically difficult examination. Definity echo contrast was used to   delineate endocardial borders.       Assessment:  · New onset LV dysfunction EF 40-45 with regional wall motion abnormalities and elevated troponin secondary to NSTEMI. AUC score 8/ AUC score 9 and indication 3  · Noncardiac chest pain which appears to be secondary to chest trauma. · History of CAD status post CABG x1 LIMA to LADOctober 2016  · Elevated troponin without chest pain or acute EKG changes in the setting of ZAMZAM and some rhabdo myelitis. · Persistent chest pain, increases on palpation and breathing appears as musculoskeletal but on going ischemia can not be ruled out. · Elevated proBNP secondary to cardiomyopathy  · Frequent mechanical falls  · Hypertension, now blood pressures are low mostly from dehydration  · CKD with ZAMZAM, improving. · Type 2 diabetes with DKA, improved. · Hyperlipidemia  · Tobacco use disorder  · Hypothyroidism on HRT  · Chronic lower back pain  · History of resting tremors  · Chronic pain syndrome        Plan:   · Continue IV heparin, aspirin and statin. Increase Atorvastatin to 40 mg   · Keep her NPO and she is scheduled for cath in AM. D/w with her  and he is agreeable. Will obtain cath and Bypass report from [facility].   · Echocardiogram to assess LV function showed EF 35-40% with regional wall motion changes.    · Management of acute renal failure and dehydration as per primary service, imporoved  · Evaluation of mechanical falls as per primary service. · D/w Dr. Prem Carreno from the primary service about the plan. Geraldo Salas MD., [facility]   [facility]) Cardiology

## 2021-12-15 NOTE — PROGRESS NOTES
Physical Therapy    Facility/Department: [address] INTERMEDIATE  Initial Assessment    NAME: Max Torres  : 1945  MRN: [id number]    Date of Service: 12/15/2021       REQUIRES PT FOLLOW UP: Yes       Patient Diagnosis(es): The primary encounter diagnosis was NSTEMI (non-ST elevated myocardial infarction) ([facility] [address] Diagnoses of Diabetic ketoacidosis without coma associated with type 2 diabetes mellitus ([facility] [address] New onset right bundle branch block (RBBB), Traumatic rhabdomyolysis, initial encounter ([facility] [address] Chronic kidney disease, unspecified CKD stage, Lactic acidosis, Acute respiratory failure with hypoxia ([facility] [address] and Acute on chronic combined systolic and diastolic CHF (congestive heart failure) ([facility] [address] were also pertinent to this visit. has a past medical history of Arthritis, Depression, Diabetes mellitus ([facility] [address] and Hypertension. has a past surgical history that includes Cholecystectomy; Appendectomy; Leg Surgery; Toe Surgery; Foot surgery; Clavicle surgery; and Dental surgery. Evaluating Therapist: Param Richter PT     Referring Provider:  Morgan Rao MD    PT order : Pt eval and treat     Room #:  636  DIAGNOSIS: lactic acidosis   Falls, weakness   PRECAUTIONS:  Falls     Social:  Pt lives with  Spouse  in a  1  floor plan  4  steps and  1  rails to enter. Prior to admission pt walked with  No AD, independent. Per pt      Initial Evaluation  Date:  12/15/2021  Treatment      Short Term/ Long Term   Goals   Was pt agreeable to Eval/treatment? yes      Does pt have pain?  chest pain, see comments      Bed Mobility  Rolling:  NT   Supine to sit: mod assist   Sit to supine:   Mod assist   Scooting:  Max assist in supine    SBA    Transfers Sit to stand:  Min assist   Stand to sit: min assist   Stand pivot: min assist    SBA    Ambulation     10  feet with ww  with  Min assist    50  feet with  ww or AAD  with  SBA        Stair negotiation: ascended and descended NT    TBA     LE ROM  STEFAN WFL      LE strength  3+ to 4-/ 5   4/5    AM- PAC RAW score  14/ 24            Pt is alert and Oriented x  3     Balance:  Min assist , fall risk due to weakness, pain, and poor activity tolerance   Endurance: decreased   Bed/Chair alarm: Yes      ASSESSMENT  Pt displays functional ability as noted in the objective portion of this evaluation. Conditions Requiring Skilled Therapeutic Intervention:    [x]Decreased strength     []Decreased ROM  [x]Decreased functional mobility  [x]Decreased balance   [x]Decreased endurance   [x]Decreased posture  []Decreased sensation  []Decreased coordination   []Decreased vision  [x]Decreased safety awareness   [x]Increased pain         Treatment/Education:    Pt in bed upon arrival ; agreeable to PT. Pt on O2@ 2 LNC and pulse ox 98%. Removed O2 and pulse ox 97%. Mobility as above with pt requiring instruction for proper technique with all mobility. After minimal mobility, pt reports \" suffocating\"  chest pain. Pulse ox on RA 94%, HR 68. Pt RTB; O2 donned @ 2 LNC, pulse oc 97% and HR 70 bpm. RN informed. Pt educated on fall risk,  Safety withmobility        Patient response to education:   Pt verbalized understanding Pt demonstrated skill Pt requires further education in this area   x  with cues/assist   x       Comments:  Pt left  In bed after session, with call light in reach. Rehab potential is Good for reaching above PT goals. Pts/ family goals   1. None stated     Patient and or family understand(s) diagnosis, prognosis, and plan of care. -  Yes     PLAN  PT care will be provided in accordance with the objectives noted above. Whenever appropriate, clear delegation orders will be provided for nursing staff. Exercises and functional mobility practice will be used as well as appropriate assistive devices or modalities to obtain goals. Patient and family education will also be administered as needed.         PLAN OF CARE:    Current Treatment Recommendations     [x] Strengthening to improve independence with functional mobility   [] ROM to improve independence with functional mobility   [x] Balance Training to improve static/dynamic balance and to reduce fall risk  [x] Endurance Training to improve activity tolerance during functional mobility   [x] Transfer Training to improve safety and independence with all functional transfers   [x] Gait Training to improve gait mechanics, endurance and assess need for appropriate assistive device  [] Stair Training in preparation for safe discharge home and/or into the community   [x] Positioning to prevent skin breakdown and contractures  [x] Safety and Education Training   [x] Patient/Caregiver Education   [] HEP  [] Other     Frequency of treatments will be 2-5x/week x  7 -10 days. Time in: 1004   Time out: 1021       Evaluation Time includes thorough review of current medical information, gathering information on past medical history/social history and prior level of function, completion of standardized testing/informal observation of tasks, assessment of data and education on plan of care and goals.     CPT codes:  [x] Low Complexity PT evaluation 48763  [] Moderate Complexity PT evaluation 62452  [] High Complexity PT evaluation 51272  [] PT Re-evaluation 77313  [] Gait training 30389  minutes  [] Therapeutic activities 99821  minutes  [] Therapeutic exercises 69504  minutes  [] Neuromuscular reeducation 22517  minutes       Jesús 18 number:  PT 6490

## 2021-12-15 NOTE — PROGRESS NOTES
Call placed in [facility] for records request, Jacqueline Michelle. All relevant info faxed to them.  Awaiting records / Vanessa Oconnor 12/15/21 8:45 AM EST

## 2021-12-15 NOTE — PROGRESS NOTES
[facility] Quality Flow/Interdisciplinary Rounds Progress Note        Quality Flow Rounds held on December 15, 2021    Disciplines Attending:  Bedside Nurse,  and     Samantha Walker was admitted on 12/13/2021  3:18 PM    Anticipated Discharge Date:  Expected Discharge Date: 12/17/21    Disposition:    Konstantin Score:  Konstantin Scale Score: 19    Readmission Risk              Risk of Unplanned Readmission:  26           Discussed patient goal for the day, patient clinical progression, and barriers to discharge. The following Goal(s) of the Day/Commitment(s) have been identified:  monitor aPTT, IV heparin.       Yoanna Torres RN  December 15, 2021

## 2021-12-16 ENCOUNTER — HOSPITAL ENCOUNTER (OUTPATIENT)
Dept: CARDIAC CATH/INVASIVE PROCEDURES | Age: 76
Discharge: HOME | End: 2021-12-16
Payer: MEDICARE

## 2021-12-16 VITALS
HEART RATE: 68 BPM | TEMPERATURE: 96.8 F | DIASTOLIC BLOOD PRESSURE: 72 MMHG | BODY MASS INDEX: 37 KG/M2 | WEIGHT: 196 LBS | SYSTOLIC BLOOD PRESSURE: 114 MMHG | HEIGHT: 61 IN | OXYGEN SATURATION: 95 % | RESPIRATION RATE: 18 BRPM

## 2021-12-16 LAB
ALBUMIN SERPL-MCNC: 3.4 G/DL (ref 3.5–5.2)
ALP BLD-CCNC: 96 U/L (ref 35–104)
ALT SERPL-CCNC: 49 U/L (ref 0–32)
ANION GAP SERPL CALCULATED.3IONS-SCNC: 12 MMOL/L (ref 7–16)
APTT: 48.2 SEC (ref 24.5–35.1)
AST SERPL-CCNC: 36 U/L (ref 0–31)
BASOPHILS ABSOLUTE: 0.05 E9/L (ref 0–0.2)
BASOPHILS RELATIVE PERCENT: 0.8 % (ref 0–2)
BILIRUB SERPL-MCNC: 0.3 MG/DL (ref 0–1.2)
BUN BLDV-MCNC: 26 MG/DL (ref 6–23)
CALCIUM SERPL-MCNC: 8.4 MG/DL (ref 8.6–10.2)
CHLORIDE BLD-SCNC: 106 MMOL/L (ref 98–107)
CO2: 20 MMOL/L (ref 22–29)
CREAT SERPL-MCNC: 1 MG/DL (ref 0.5–1)
EOSINOPHILS ABSOLUTE: 0.22 E9/L (ref 0.05–0.5)
EOSINOPHILS RELATIVE PERCENT: 3.5 % (ref 0–6)
GFR AFRICAN AMERICAN: >60
GFR NON-AFRICAN AMERICAN: 54 ML/MIN/1.73
GLUCOSE BLD-MCNC: 131 MG/DL (ref 74–99)
HCT VFR BLD CALC: 34.5 % (ref 34–48)
HEMOGLOBIN: 11.5 G/DL (ref 11.5–15.5)
IMMATURE GRANULOCYTES #: 0.03 E9/L
IMMATURE GRANULOCYTES %: 0.5 % (ref 0–5)
LYMPHOCYTES ABSOLUTE: 2.04 E9/L (ref 1.5–4)
LYMPHOCYTES RELATIVE PERCENT: 32.6 % (ref 20–42)
MAGNESIUM: 1.8 MG/DL (ref 1.6–2.6)
MCH RBC QN AUTO: 31.9 PG (ref 26–35)
MCHC RBC AUTO-ENTMCNC: 33.3 % (ref 32–34.5)
MCV RBC AUTO: 95.8 FL (ref 80–99.9)
METER GLUCOSE: 122 MG/DL (ref 74–99)
METER GLUCOSE: 137 MG/DL (ref 74–99)
METER GLUCOSE: 246 MG/DL (ref 74–99)
MONOCYTES ABSOLUTE: 0.51 E9/L (ref 0.1–0.95)
MONOCYTES RELATIVE PERCENT: 8.2 % (ref 2–12)
NEUTROPHILS ABSOLUTE: 3.4 E9/L (ref 1.8–7.3)
NEUTROPHILS RELATIVE PERCENT: 54.4 % (ref 43–80)
ORGANISM: ABNORMAL
PDW BLD-RTO: 14.6 FL (ref 11.5–15)
PHOSPHORUS: 2.6 MG/DL (ref 2.5–4.5)
PLATELET # BLD: 202 E9/L (ref 130–450)
PMV BLD AUTO: 10.9 FL (ref 7–12)
POTASSIUM SERPL-SCNC: 4 MMOL/L (ref 3.5–5)
RBC # BLD: 3.6 E12/L (ref 3.5–5.5)
SODIUM BLD-SCNC: 138 MMOL/L (ref 132–146)
TOTAL PROTEIN: 6 G/DL (ref 6.4–8.3)
URINE CULTURE, ROUTINE: ABNORMAL
WBC # BLD: 6.3 E9/L (ref 4.5–11.5)

## 2021-12-16 PROCEDURE — 85025 COMPLETE CBC W/AUTO DIFF WBC: CPT

## 2021-12-16 PROCEDURE — 6360000002 HC RX W HCPCS

## 2021-12-16 PROCEDURE — 2709999900 HC NON-CHARGEABLE SUPPLY

## 2021-12-16 PROCEDURE — 6370000000 HC RX 637 (ALT 250 FOR IP): Performed by: INTERNAL MEDICINE

## 2021-12-16 PROCEDURE — 2500000003 HC RX 250 WO HCPCS

## 2021-12-16 PROCEDURE — 2060000000 HC ICU INTERMEDIATE R&B

## 2021-12-16 PROCEDURE — C1760 CLOSURE DEV, VASC: HCPCS

## 2021-12-16 PROCEDURE — 80053 COMPREHEN METABOLIC PANEL: CPT

## 2021-12-16 PROCEDURE — 85730 THROMBOPLASTIN TIME PARTIAL: CPT

## 2021-12-16 PROCEDURE — 93459 L HRT ART/GRFT ANGIO: CPT | Performed by: INTERNAL MEDICINE

## 2021-12-16 PROCEDURE — 6360000002 HC RX W HCPCS: Performed by: INTERNAL MEDICINE

## 2021-12-16 PROCEDURE — 99223 1ST HOSP IP/OBS HIGH 75: CPT | Performed by: INTERNAL MEDICINE

## 2021-12-16 PROCEDURE — 99152 MOD SED SAME PHYS/QHP 5/>YRS: CPT

## 2021-12-16 PROCEDURE — 99153 MOD SED SAME PHYS/QHP EA: CPT

## 2021-12-16 PROCEDURE — 93459 L HRT ART/GRFT ANGIO: CPT

## 2021-12-16 PROCEDURE — 82962 GLUCOSE BLOOD TEST: CPT

## 2021-12-16 PROCEDURE — C1769 GUIDE WIRE: HCPCS

## 2021-12-16 PROCEDURE — 84100 ASSAY OF PHOSPHORUS: CPT

## 2021-12-16 PROCEDURE — 83735 ASSAY OF MAGNESIUM: CPT

## 2021-12-16 PROCEDURE — 36415 COLL VENOUS BLD VENIPUNCTURE: CPT

## 2021-12-16 PROCEDURE — 2700000000 HC OXYGEN THERAPY PER DAY

## 2021-12-16 PROCEDURE — C1894 INTRO/SHEATH, NON-LASER: HCPCS

## 2021-12-16 RX ORDER — SODIUM CHLORIDE 0.9 % (FLUSH) 0.9 %
5-40 SYRINGE (ML) INJECTION PRN
OUTPATIENT
Start: 2021-12-16

## 2021-12-16 RX ORDER — FUROSEMIDE 10 MG/ML
40 INJECTION INTRAMUSCULAR; INTRAVENOUS ONCE
Status: COMPLETED | OUTPATIENT
Start: 2021-12-16 | End: 2021-12-16

## 2021-12-16 RX ORDER — SODIUM CHLORIDE 0.9 % (FLUSH) 0.9 %
5-40 SYRINGE (ML) INJECTION EVERY 12 HOURS SCHEDULED
OUTPATIENT
Start: 2021-12-16

## 2021-12-16 RX ORDER — SODIUM CHLORIDE 9 MG/ML
INJECTION, SOLUTION INTRAVENOUS CONTINUOUS
OUTPATIENT
Start: 2021-12-16

## 2021-12-16 RX ORDER — SODIUM CHLORIDE 9 MG/ML
25 INJECTION, SOLUTION INTRAVENOUS PRN
OUTPATIENT
Start: 2021-12-16

## 2021-12-16 RX ADMIN — HYDROCODONE BITARTRATE AND ACETAMINOPHEN 1 TABLET: 5; 325 TABLET ORAL at 14:00

## 2021-12-16 RX ADMIN — PANCRELIPASE 36000 UNITS: 60000; 12000; 38000 CAPSULE, DELAYED RELEASE PELLETS ORAL at 16:33

## 2021-12-16 RX ADMIN — INSULIN HUMAN 10 UNITS: 100 INJECTION, SUSPENSION SUBCUTANEOUS at 16:38

## 2021-12-16 RX ADMIN — HYDROCODONE BITARTRATE AND ACETAMINOPHEN 1 TABLET: 5; 325 TABLET ORAL at 01:55

## 2021-12-16 RX ADMIN — HYDROCODONE BITARTRATE AND ACETAMINOPHEN 1 TABLET: 5; 325 TABLET ORAL at 21:00

## 2021-12-16 RX ADMIN — FUROSEMIDE 40 MG: 10 INJECTION, SOLUTION INTRAVENOUS at 16:33

## 2021-12-16 RX ADMIN — ATORVASTATIN CALCIUM 40 MG: 40 TABLET, FILM COATED ORAL at 21:01

## 2021-12-16 RX ADMIN — INSULIN LISPRO 1 UNITS: 100 INJECTION, SOLUTION INTRAVENOUS; SUBCUTANEOUS at 21:04

## 2021-12-16 RX ADMIN — METOPROLOL TARTRATE 25 MG: 25 TABLET, FILM COATED ORAL at 21:01

## 2021-12-16 RX ADMIN — HYDROXYZINE PAMOATE 25 MG: 25 CAPSULE ORAL at 17:56

## 2021-12-16 ASSESSMENT — PAIN SCALES - GENERAL
PAINLEVEL_OUTOF10: 7
PAINLEVEL_OUTOF10: 8
PAINLEVEL_OUTOF10: 7
PAINLEVEL_OUTOF10: 9

## 2021-12-16 NOTE — OP NOTE
Operative Note      Patient: Young Deras  YOB: 1945  MRN: [id number]    Date of Procedure: 12/16/21            Indication:  1. NSTEMI  2. AUC score 8  3. AUC indication :  4    Procedure: Left heart catheterization, coronary angiography, graft angiography ( LIMA to LAD ), left ventriculography, left femoral artery angiography and closure of the access site with angio seal    Anesthesia: Versed, Fentanyl  Time sedation was administered: 11:34. I was present in the room when sedation was administered. Procedure end time: 11:57  Time spent with face to face monitoring of moderate sedation: 36 minutes    Cath performed by right femoral approach using 6F sheath. Findings:  Left main: no significant disease  LAD: 100 % stenosis. Patent LIMA  Circumflex: no significant disease  RCA: Dominant. No significant disease. LV angio: 45-50%  ejection fraction    Hemodynamics:  LV: 111 mmHg. LVEDP 13  No gradient across AV. Ao: 102/44 ( 64 ).        Complication: None   Blood loss:10 cc  Contrast used: 85 cc    Post Op diagnosis:  Occluded LAD with patent LIMA to LAD with no significant disease in LM, LCx or RCA  Mild LV systolic dysfunction    PLAN:  Medical therapy with continued risk factor modification  TRF back to the [address] [place] [address]        Electronically signed by Good Collier MD on 12/16/2021 at 12:07 PM

## 2021-12-16 NOTE — DISCHARGE INSTR - COC
Continuity of Care Form    Patient Name: Dillon Ritter   :  1945  MRN:  [id number]    Admit date:  2021  Discharge date:  ***    Code Status Order: Prior   Advance Directives:      Admitting Physician:  Yung Busby MD  PCP: Estrada Sherwood MD    Discharging Nurse: [facility] Unit/Room#: 3937/8330-O  Discharging Unit Phone Number: ***    Emergency Contact:   Extended Emergency Contact Information  Primary Emergency Contact: Sam Tejada  Address: [address] [place] [address], [address] Phone: [phone]  Mobile Phone: [phone]  Relation: Spouse   needed? No  Secondary Emergency Contact: Mary Sahu, Eugene Haji [address] Phone: [phone]  Relation: Brother/Sister   needed?  No    Past Surgical History:  Past Surgical History:   Procedure Laterality Date    APPENDECTOMY      CHOLECYSTECTOMY      CLAVICLE SURGERY      DENTAL SURGERY      FOOT SURGERY      LEG SURGERY      right    TOE SURGERY         Immunization History:   Immunization History   Administered Date(s) Administered    COVID-19, J&J, PF, 0.5 mL 04/10/2021    Td, unspecified formulation 2012    Tdap (Boostrix, Adacel) 2021       Active Problems:  Patient Active Problem List   Diagnosis Code    Subcutaneous mass R22.9    Diabetes mellitus ([facility] [address] E11.9    Syncope and collapse R55    Severe protein-calorie malnutrition ([facility] [address] E43    NSTEMI (non-ST elevated myocardial infarction) ([facility] [address] I21.4       Isolation/Infection:   Isolation            No Isolation          Patient Infection Status       Infection Onset Added Last Indicated Last Indicated By Review Planned Expiration Resolved Resolved By    None active    Resolved    COVID-19 (Rule Out) 21 COVID-19, Rapid (Ordered)   21 Rule-Out Test Resulted            Nurse Assessment:  Last Vital Signs: /64   Pulse 66   Temp 97.7 °F (36.5 °C) (Oral) Resp 18   Ht 5' 1\" (1.549 m)   Wt 196 lb 11.2 oz (89.2 kg)   SpO2 93%   BMI 37.17 kg/m²     Last documented pain score (0-10 scale): Pain Level: 7  Last Weight:   Wt Readings from Last 1 Encounters:   12/16/21 196 lb 11.2 oz (89.2 kg)     Mental Status:  oriented and alert    IV Access:  - None    Nursing Mobility/ADLs:  Walking   Assisted  Transfer  Assisted  Bathing  Assisted  Dressing  Assisted  Toileting  Assisted  Feeding  Independent  Med Admin  Independent  Med Delivery   whole    Wound Care Documentation and Therapy:        Elimination:  Continence: Bowel: ***  Bladder: ***  Urinary Catheter: Removal Date 12/20/2021    Colostomy/Ileostomy/Ileal Conduit: No       Date of Last BM: 4 days ago per patient    Intake/Output Summary (Last 24 hours) at 12/16/2021 1606  Last data filed at 12/16/2021 8401  Gross per 24 hour   Intake --   Output 1250 ml   Net -1250 ml     I/O last 3 completed shifts:  In: -   Out: 1250 [Urine:1250]    Safety Concerns:     History of Falls (last 30 days) and At Risk for Falls    Impairments/Disabilities:      None    Nutrition Therapy:  Current Nutrition Therapy:   - Oral Diet:  General, Carb Control 4 carbs/meal (1800kcals/day), and Low Sodium (2gm)    Routes of Feeding: Oral  Liquids: Thin Liquids  Daily Fluid Restriction: no  Last Modified Barium Swallow with Video (Video Swallowing Test): not done    Treatments at the Time of Hospital Discharge:   Respiratory Treatments: ***  Oxygen Therapy:  is not on home oxygen therapy.   Ventilator:    - No ventilator support    Rehab Therapies: PT and OT Eval and treat   Weight Bearing Status/Restrictions: No weight bearing restirctions  Other Medical Equipment (for information only, NOT a DME order):  ***  Other Treatments: ***    Patient's personal belongings (please select all that are sent with patient):  {[facility] DME Belongings:[id number]}    RN SIGNATURE:  Electronically signed by Veto Persaud RN on 12/20/21 at 3:55 PM EST    CASE MANAGEMENT/SOCIAL WORK SECTION    Inpatient Status Date: ***    Readmission Risk Assessment Score:  Readmission Risk              Risk of Unplanned Readmission:  27           Discharging to Facility/ Agency   Name: Trung Egan [facility], [address] [place] [address]  Phone:[phone]  Fax:[phone]    Dialysis Facility (if applicable)   Name:  Address:  Dialysis Schedule:  Phone:  Fax:    / signature: Electronically signed by MELLO Morales on 12/16/21 at 4:08 PM EST     PHYSICIAN SECTION    Prognosis: {Prognosis:[id number]}    Condition at Discharge: Stable    Rehab Potential (if transferring to Rehab): {Prognosis:8262719216}    Recommended Labs or Other Treatments After Discharge: ***    Physician Certification: I certify the above information and transfer of Jose Sheehan  is necessary for the continuing treatment of the diagnosis listed and that she requires [place] for Less than 30 days.      Update Admission H&P: No change in H&P    PHYSICIAN SIGNATURE:  Electronically signed by Leta Jordan MD on 12/20/21 at 12:37 PM EST

## 2021-12-16 NOTE — CARE COORDINATION
Met with patient and her - discussed plan of care and discharge planning. [facility] 14/24. Pt stated that she does not feel safe discharging home and is interested in [facility]. Choice list presented to pt and her . They would like referral to [facility]. Pt and her  will continue to review list for additional [facility] choice is needed. Referral made to Curry Cano at [facility]- await response.

## 2021-12-16 NOTE — PROGRESS NOTES
Interventional Cardiology initial encounter Note     Name: Rony Mesa    Age: 78 y.o. Date of Admission: 12/16/2021  7:53 AM    Date of Service: 12/16/2021     Follow-up:  NSTEMI    Interim History:  No new overnight cardiac complaints. Currently with no complaints of CP, SOB, palpitations, dizziness, or lightheadedness. SR on telemetry. Review of Systems:   Cardiac: As per HPI  General: No fever, chills  Pulmonary: As per HPI  HEENT: No visual disturbances, difficult swallowing  GI: No nausea, vomiting  Endocrine: No thyroid disease or DM  Musculoskeletal: MAYO x 4, no focal motor deficits  Skin: Intact, no rashes  Neuro/Psych: No headache or seizures    Problem List:  Patient Active Problem List   Diagnosis    Subcutaneous mass    Diabetes mellitus ([facility] [address]    Syncope and collapse    Severe protein-calorie malnutrition ([facility] [address]    NSTEMI (non-ST elevated myocardial infarction) ([facility] [address]       Allergies:  No Known Allergies    Current Medications:  No current facility-administered medications for this encounter. No current outpatient medications on file.      Facility-Administered Medications Ordered in Other Encounters   Medication Dose Route Frequency Provider Last Rate Last Admin    atorvastatin (LIPITOR) tablet 40 mg  40 mg Oral Nightly Angelo Magallanes MD   40 mg at 12/15/21 2125    traMADol (ULTRAM) tablet 50 mg  50 mg Oral Q6H PRN Ladi Terry MD        lipase-protease-amylase (CREON) delayed release capsule 36,000 Units  36,000 Units Oral TID WC Ladi Terry MD   36,000 Units at 12/15/21 1648    glucose (GLUTOSE) 40 % oral gel 15 g  15 g Oral PRN Ladi Terry MD        dextrose 50 % IV solution  12.5 g IntraVENous PRN Ladi Terry MD        glucagon (rDNA) injection 1 mg  1 mg IntraMUSCular PRN Ladi Terry MD        dextrose 5 % solution  100 mL/hr IntraVENous PRN Ladi Terry MD        cefTRIAXone (ROCEPHIN) 1,000 mg in sodium chloride flush 10 mL IV syringe  1,000 mg IntraVENous Q24H Bibi Beth MD   1,000 mg at 12/15/21 0928    HYDROcodone-acetaminophen ([facility]) 5-325 MG per tablet 1 tablet  1 tablet Oral TID PRN Bibi Beth MD   1 tablet at 12/16/21 0155    DULoxetine (CYMBALTA) extended release capsule 60 mg  60 mg Oral Daily Bibi Beth MD   60 mg at 12/15/21 0923    metoprolol tartrate (LOPRESSOR) tablet 25 mg  25 mg Oral BID Bibi Beth MD   25 mg at 12/15/21 3400    levothyroxine (SYNTHROID) tablet 75 mcg  75 mcg Oral Daily Bibi Beth MD   75 mcg at 12/15/21 0508    pantoprazole (PROTONIX) tablet 40 mg  40 mg Oral QAM AC Bibi Beth MD   40 mg at 12/15/21 3100    hydrOXYzine (VISTARIL) capsule 25 mg  25 mg Oral TID PRN Bibi Beth MD   25 mg at 12/14/21 1336    tiZANidine (ZANAFLEX) tablet 4 mg  4 mg Oral Q12H PRN Bibi Beth MD   4 mg at 12/14/21 1541    aspirin EC tablet 81 mg  81 mg Oral Daily Bibi Beth MD   81 mg at 12/15/21 0933    insulin NPH (HUMULIN N;NOVOLIN N) injection vial 10 Units  10 Units SubCUTAneous BID AC Bibi Beth MD   10 Units at 12/15/21 1645    insulin lispro (HUMALOG) injection vial 0-6 Units  0-6 Units SubCUTAneous TID WC Bibi Beth MD   2 Units at 12/15/21 1220    insulin lispro (HUMALOG) injection vial 0-3 Units  0-3 Units SubCUTAneous Nightly Bibi Beth MD   1 Units at 12/15/21 2125    heparin (porcine) injection 4,000 Units  4,000 Units IntraVENous PRN Bibi Beth MD   4,000 Units at 12/15/21 0659    heparin (porcine) injection 2,000 Units  2,000 Units IntraVENous PRN Bibi Beth MD   2,000 Units at 12/15/21 2244    heparin 25,000 units in dextrose 5% 250 mL (premix) infusion  5-30 Units/kg/hr IntraVENous Continuous Bibi Beth MD 12.7 mL/hr at 12/15/21 2243 16 Units/kg/hr at 12/15/21 2243    dextrose 50 % IV solution  12.5 g IntraVENous PRN Bibi Beth MD        0.45 % sodium chloride infusion IntraVENous Continuous Lizet Morocho MD 75 mL/hr at 12/15/21 2425 Rate Change at 12/15/21 3110         Physical Exam:  There were no vitals taken for this visit. Wt Readings from Last 3 Encounters:   12/16/21 196 lb 11.2 oz (89.2 kg)   10/13/21 169 lb 11.2 oz (77 kg)   06/15/21 170 lb (77.1 kg)     Appearance: Awake, alert, no acute respiratory distress  Skin: Intact, no rash  Head: Normocephalic, atraumatic  Eyes: EOMI, no conjunctival erythema  ENMT: No pharyngeal erythema, MMM, no rhinorrhea  Neck: Supple, no elevated JVP, no carotid bruits  Lungs: Clear to auscultation bilaterally. No wheezes, rales, or rhonchi. Cardiac: Regular rate and rhythm, +S1S2, no murmurs apparent  Abdomen: Soft, nontender, +bowel sounds  Extremities: Moves all extremities x 4, no lower extremity edema  Neurologic: No focal motor deficits apparent, normal mood and affect  Peripheral Pulses: Intact posterior tibial pulses bilaterally    Intake/Output:    Intake/Output Summary (Last 24 hours) at 12/16/2021 0942  Last data filed at 12/16/2021 8578  Gross per 24 hour   Intake    Output 2250 ml   Net -2250 ml     No intake/output data recorded.     Laboratory Tests:  Recent Labs     12/14/21  0600 12/15/21  0530 12/16/21  0339    133 138   K 3.5 3.1* 4.0    103 106   CO2 16* 16* 20*   BUN 43* 30* 26*   CREATININE 1.5* 1.1* 1.0   GLUCOSE 184* 95 131*   CALCIUM 7.5* 7.8* 8.4*     Lab Results   Component Value Date    MG 1.8 12/16/2021     Recent Labs     12/13/21  2033 12/15/21  0530 12/16/21  0339   ALKPHOS 100 88 96   ALT 45* 40* 49*   AST 84* 45* 36*   PROT 6.4 5.7* 6.0*   BILITOT 0.3 0.3 0.3   LABALBU 3.9 3.2* 3.4*     Recent Labs     12/14/21  0600 12/15/21  0530 12/16/21  0339   WBC 10.4 8.8 6.3   RBC 3.28* 3.61 3.60   HGB 10.3* 11.1* 11.5   HCT 31.8* 34.1 34.5   MCV 97.0 94.5 95.8   MCH 31.4 30.7 31.9   MCHC 32.4 32.6 33.3   RDW 14.6 14.5 14.6    192 202   MPV 10.7 10.5 10.9     Lab Results   Component Value Date CKTOTAL 1,547 (H) 12/13/2021    CKMB 3.8 08/19/2015    TROPONINI <0.01 07/09/2019    TROPONINI <0.01 02/25/2017    TROPONINI 0.01 08/19/2015     Lab Results   Component Value Date    INR 1.0 06/14/2021    INR 1.2 07/09/2019    INR 1.0 08/19/2015    PROTIME 11.1 06/14/2021    PROTIME 14.1 (H) 07/09/2019    PROTIME 11.2 08/19/2015     Lab Results   Component Value Date    TSH 1.480 06/15/2021     Lab Results   Component Value Date    LABA1C 6.5 (H) 12/14/2021     No results found for: EAG  Lab Results   Component Value Date    CHOL 164 11/13/2015     Lab Results   Component Value Date    TRIG 52 11/13/2015     Lab Results   Component Value Date    HDL 77 11/13/2015     Lab Results   Component Value Date    LDLCALC 77 11/13/2015     Lab Results   Component Value Date    LABVLDL 10 11/13/2015     No results found for: CHOLHDLRATIO    Cardiac Tests:  Telemetry findings reviewed: SR at rate 80s, no new tachy/bradyarrhythmias overnight     EKG: Normal sinus rhythm, first-degree block, right bundle branch block, poor R wave progression, abnormal EKG.    Vitals and labs were reviewed: Blood pressure 112/72 heart rate 76 O2 sats 98% on 2 L.     LabsBUNs/creatinine 49/1.8 (baseline 1.2)>> 43/1.5>>30/1.1, K 3.1 electrolytes normal.  Lactic acid 3.8>> 2.8, total CK 1547, proBNP 50,183>>32960, troponin, high-sensitivity 1215> 964, AST/ALT 84/45. Beta hydroxybutyrate 1.98, A1c 6.5 H&H 11.9/36.8>> 10.3/31.8>>11.1/34.1. Urinalysis was positive for trace amount of ketones moderate blood small amount of leukocyte Estrace nitrates negative WBC 10-20 RBC 10-20 moderate bacteria.     12/13/2021 CXR:  Pulmonary vascular congestion suspect early CHF.  Please correlate clinically.     12/13/2021 Pelvic XRay:  No acute bony abnormality.     12/13/2021 CT Head:  No acute intercranial abnormality.     12/13/2021 CT of Cervical Spine:  No acute abnormality of the cervical spine.   Multilevel degenerative changes, as noted above    12/13/2021 CTA Chest:  No evidence of pulmonary embolism. Suspect cardiomegaly and pulmonary vascular congestion.     12/13/2021 CT of Abdomen and Pelvis:  No acute process in the abdomen and pelvis  Probable cirrhotic liver with hemangioma in the left lobe. 2 cm left renal cyst.  Constipation  Fat-containing ventral hernia, upper abdomen  Bibasilar atelectasis, lung bases.       TTE- 12/14/2021:   Left ventricle size is normal.   Ejection fraction is visually estimated at 40-45%. Overall ejection fraction mild-to-moderately decreased . Mid to basal inferolateral and inferior walls are severely hypokinetic. Normal left ventricle wall thickness. Stage I diastolic dysfunction. Mildly dilated right ventricle. Right ventricle global systolic function is mildly reduced . TAPSE 14 mm. No hemodynamically significant aortic stenosis is present. Mild to moderate tricuspid regurgitation. RVSP is 38 mmHg. Normal estimated PA systolic pressure. No evidence for hemodynamically significant pericardial effusion. Technically difficult examination. Definity echo contrast was used to   delineate endocardial borders.       Assessment:  · New onset LV dysfunction EF 40-45 with regional wall motion abnormalities and elevated troponin secondary to NSTEMI. AUC score 8/ AUC score 9 and indication 3  · Noncardiac chest pain which appears to be secondary to chest trauma. · History of CAD status post CABG x1 LIMA to LADOctober 2016  · Elevated troponin without chest pain or acute EKG changes in the setting of ZAMZAM and some rhabdo myelitis. · Persistent chest pain, increases on palpation and breathing appears as musculoskeletal but on going ischemia can not be ruled out. · Elevated proBNP secondary to cardiomyopathy  · Frequent mechanical falls  · Hypertension, now blood pressures are low mostly from dehydration  · CKD with ZAMZAM, improving. · Type 2 diabetes with DKA, improved.    · Hyperlipidemia  · Tobacco use disorder  · Hypothyroidism on HRT  · Chronic lower back pain  · History of resting tremors  · Chronic pain syndrome        Plan:   · Continue current cardiac medications   · For cath + /- PCI today. Risks,benefits and alternative therapies to the procedure explained. hse understood and consented to proceed.    · Further recommendations to follow       Electronically signed by Nadya Banuelos MD on 12/16/2021 at 9:44 AM

## 2021-12-16 NOTE — CARE COORDINATION
Social Work / Discharge Planning : SW spoke to Marlena Davis from [facility]. They accepted patient and submitted pre-cert. [place] is NOT waived. Await pre-cert. [address] generated and transport forms completed. Will need negative COVID. SW to follow.  Electronically signed by MELLO Shell on 12/16/21 at 4:15 PM EST

## 2021-12-16 NOTE — PROCEDURES
[address]                  [address] [facility],  [facility]                            CARDIAC CATHETERIZATION    PATIENT NAME: Juan Duffy                      :        1945  MED REC NO:   [id number]                            ROOM:  ACCOUNT NO:   [de-identified]                           ADMIT DATE: 2021  PROVIDER:     Guille East MD    DATE OF PROCEDURE:  2021    PROCEDURES PERFORMED:  Left heart catheterization, selective coronary  angiography, graft angiography x1 (LIMA to LAD), left ventriculography,  right femoral artery angiography and closure of the access site with  Angio-Seal device. SEDATION:  The patient received intravenous Versed and intravenous  fentanyl for sedation. INDICATION:  Non-ST-elevation myocardial infarction. PRESSURES:  Aorta 102/44 with a mean of 64. Left ventricular systolic pressure 530, left ventricular end-diastolic  pressure 13. There was no significant gradient across the aortic valve. Access was obtained in the right femoral artery using ultrasound  guidance. CORONARY ANGIOGRAPHY:  Left main. The left main artery has around 10% to 20% disease after its  origin. LAD. The left anterior descending artery is occluded shortly after its  origin. LCX. The left circumflex is a nondominant vessel. The first marginal  branch did not appear to have any significant disease. The second  marginal branch has mild around 50% ostial narrowing and there is a  360-degree loop in the proximal vessel. The rest of the left circumflex  did not appear to have any significant disease. RCA. The right coronary artery is a dominant vessel which did not  appear to have any significant angiographic disease. LIMA to LAD. The left internal mammary artery to left anterior  descending artery is widely patent along its course including its distal  anastomosis.   The LAD after the LIMA anastomosis is widely patent  without any significant angiographic disease. LEFT VENTRICULOGRAPHY:  The left ventricle is normal in size. There was  hypokinesis and outpouching of the mid to basal anterolateral wall and  also the mid inferior wall. The ejection fraction was estimated at 45%  to 50%. There was no mitral regurgitation. RIGHT FEMORAL ARTERY ANGIOGRAPHY:  The right common femoral artery and  the proximal segments of the right superficial femoral artery and the  right profunda did not appear to have any significant disease. Notably,  these vessels were relatively small in caliber. Closure of the access  site was performed with Angio-Seal device with achievement of adequate  hemostasis. The patient tolerated the procedure well and left the cardiac  catheterization laboratory in stable condition. CONCLUSIONS:  1. Coronary artery disease. A.  Left main. 20% proximal vessel narrowing. B.  LAD. Occluded shortly after its origin. Patent LIMA to LAD. C.  LCX. Around 50% ostial narrowing of the second marginal branch. D.  RCA. Dominant vessel with no significant angiographic disease. 2.  Normal left ventricular size without pouching and hypokinesis to  akinesis of the mid anterolateral wall and hypokinesis to akinesis of  the mid inferior wall, with the rest of the myocardial segments  jef vigorously with an estimated ejection fraction of 45% to  50%. 3.  Closure of the right femoral artery access site with Angio-Seal  device.         Spring Uriarte MD    D: 12/16/2021 12:23:13       T: 12/16/2021 12:25:25     [email or web address]  Job#: [id number]     Doc#: [id number]    CC:
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
## 2021-12-16 NOTE — PROGRESS NOTES
Pt arrived back to floor A+Ox3 c/o back pain right groin drsg D+I + pulses + mvnt + sensation to right leg.  No bleeding no hematoma

## 2021-12-17 LAB
ALBUMIN SERPL-MCNC: 3.8 G/DL (ref 3.5–5.2)
ALP BLD-CCNC: 106 U/L (ref 35–104)
ALT SERPL-CCNC: 37 U/L (ref 0–32)
ANION GAP SERPL CALCULATED.3IONS-SCNC: 13 MMOL/L (ref 7–16)
APTT: 28.4 SEC (ref 24.5–35.1)
AST SERPL-CCNC: 23 U/L (ref 0–31)
BASOPHILS ABSOLUTE: 0.04 E9/L (ref 0–0.2)
BASOPHILS RELATIVE PERCENT: 0.6 % (ref 0–2)
BILIRUB SERPL-MCNC: 0.5 MG/DL (ref 0–1.2)
BUN BLDV-MCNC: 18 MG/DL (ref 6–23)
CALCIUM SERPL-MCNC: 9.1 MG/DL (ref 8.6–10.2)
CHLORIDE BLD-SCNC: 106 MMOL/L (ref 98–107)
CO2: 23 MMOL/L (ref 22–29)
CREAT SERPL-MCNC: 0.9 MG/DL (ref 0.5–1)
EOSINOPHILS ABSOLUTE: 0.22 E9/L (ref 0.05–0.5)
EOSINOPHILS RELATIVE PERCENT: 3.5 % (ref 0–6)
GFR AFRICAN AMERICAN: >60
GFR NON-AFRICAN AMERICAN: >60 ML/MIN/1.73
GLUCOSE BLD-MCNC: 205 MG/DL (ref 74–99)
HCT VFR BLD CALC: 40.3 % (ref 34–48)
HEMOGLOBIN: 13 G/DL (ref 11.5–15.5)
IMMATURE GRANULOCYTES #: 0.03 E9/L
IMMATURE GRANULOCYTES %: 0.5 % (ref 0–5)
LYMPHOCYTES ABSOLUTE: 1.92 E9/L (ref 1.5–4)
LYMPHOCYTES RELATIVE PERCENT: 30.7 % (ref 20–42)
MAGNESIUM: 1.7 MG/DL (ref 1.6–2.6)
MCH RBC QN AUTO: 30.7 PG (ref 26–35)
MCHC RBC AUTO-ENTMCNC: 32.3 % (ref 32–34.5)
MCV RBC AUTO: 95.3 FL (ref 80–99.9)
METER GLUCOSE: 155 MG/DL (ref 74–99)
METER GLUCOSE: 177 MG/DL (ref 74–99)
METER GLUCOSE: 190 MG/DL (ref 74–99)
METER GLUCOSE: 211 MG/DL (ref 74–99)
MONOCYTES ABSOLUTE: 0.55 E9/L (ref 0.1–0.95)
MONOCYTES RELATIVE PERCENT: 8.8 % (ref 2–12)
NEUTROPHILS ABSOLUTE: 3.49 E9/L (ref 1.8–7.3)
NEUTROPHILS RELATIVE PERCENT: 55.9 % (ref 43–80)
PDW BLD-RTO: 14.4 FL (ref 11.5–15)
PHOSPHORUS: 2.2 MG/DL (ref 2.5–4.5)
PLATELET # BLD: 257 E9/L (ref 130–450)
PMV BLD AUTO: 10.3 FL (ref 7–12)
POTASSIUM SERPL-SCNC: 4.3 MMOL/L (ref 3.5–5)
RBC # BLD: 4.23 E12/L (ref 3.5–5.5)
SODIUM BLD-SCNC: 142 MMOL/L (ref 132–146)
TOTAL PROTEIN: 6.3 G/DL (ref 6.4–8.3)
WBC # BLD: 6.3 E9/L (ref 4.5–11.5)

## 2021-12-17 PROCEDURE — 6370000000 HC RX 637 (ALT 250 FOR IP): Performed by: INTERNAL MEDICINE

## 2021-12-17 PROCEDURE — 80053 COMPREHEN METABOLIC PANEL: CPT

## 2021-12-17 PROCEDURE — 85025 COMPLETE CBC W/AUTO DIFF WBC: CPT

## 2021-12-17 PROCEDURE — 83735 ASSAY OF MAGNESIUM: CPT

## 2021-12-17 PROCEDURE — 97530 THERAPEUTIC ACTIVITIES: CPT

## 2021-12-17 PROCEDURE — 84100 ASSAY OF PHOSPHORUS: CPT

## 2021-12-17 PROCEDURE — 99233 SBSQ HOSP IP/OBS HIGH 50: CPT | Performed by: INTERNAL MEDICINE

## 2021-12-17 PROCEDURE — 6360000002 HC RX W HCPCS: Performed by: INTERNAL MEDICINE

## 2021-12-17 PROCEDURE — 2580000003 HC RX 258: Performed by: INTERNAL MEDICINE

## 2021-12-17 PROCEDURE — 2060000000 HC ICU INTERMEDIATE R&B

## 2021-12-17 PROCEDURE — 36415 COLL VENOUS BLD VENIPUNCTURE: CPT

## 2021-12-17 PROCEDURE — 85730 THROMBOPLASTIN TIME PARTIAL: CPT

## 2021-12-17 PROCEDURE — 82962 GLUCOSE BLOOD TEST: CPT

## 2021-12-17 RX ORDER — FUROSEMIDE 10 MG/ML
40 INJECTION INTRAMUSCULAR; INTRAVENOUS ONCE
Status: COMPLETED | OUTPATIENT
Start: 2021-12-17 | End: 2021-12-17

## 2021-12-17 RX ORDER — METOPROLOL SUCCINATE 25 MG/1
25 TABLET, EXTENDED RELEASE ORAL DAILY
Status: DISCONTINUED | OUTPATIENT
Start: 2021-12-17 | End: 2021-12-20 | Stop reason: HOSPADM

## 2021-12-17 RX ORDER — LISINOPRIL 5 MG/1
5 TABLET ORAL DAILY
Status: DISCONTINUED | OUTPATIENT
Start: 2021-12-17 | End: 2021-12-20 | Stop reason: HOSPADM

## 2021-12-17 RX ADMIN — LISINOPRIL 5 MG: 5 TABLET ORAL at 09:21

## 2021-12-17 RX ADMIN — INSULIN HUMAN 10 UNITS: 100 INJECTION, SUSPENSION SUBCUTANEOUS at 07:25

## 2021-12-17 RX ADMIN — METOPROLOL SUCCINATE 25 MG: 25 TABLET, EXTENDED RELEASE ORAL at 09:21

## 2021-12-17 RX ADMIN — HYDROCODONE BITARTRATE AND ACETAMINOPHEN 1 TABLET: 5; 325 TABLET ORAL at 04:02

## 2021-12-17 RX ADMIN — LEVOTHYROXINE SODIUM 75 MCG: 75 TABLET ORAL at 07:26

## 2021-12-17 RX ADMIN — PANCRELIPASE 36000 UNITS: 60000; 12000; 38000 CAPSULE, DELAYED RELEASE PELLETS ORAL at 12:15

## 2021-12-17 RX ADMIN — INSULIN LISPRO 1 UNITS: 100 INJECTION, SOLUTION INTRAVENOUS; SUBCUTANEOUS at 16:11

## 2021-12-17 RX ADMIN — INSULIN LISPRO 1 UNITS: 100 INJECTION, SOLUTION INTRAVENOUS; SUBCUTANEOUS at 20:17

## 2021-12-17 RX ADMIN — HYDROCODONE BITARTRATE AND ACETAMINOPHEN 1 TABLET: 5; 325 TABLET ORAL at 12:15

## 2021-12-17 RX ADMIN — ASPIRIN 81 MG: 81 TABLET, COATED ORAL at 09:21

## 2021-12-17 RX ADMIN — HYDROCODONE BITARTRATE AND ACETAMINOPHEN 1 TABLET: 5; 325 TABLET ORAL at 20:11

## 2021-12-17 RX ADMIN — INSULIN HUMAN 10 UNITS: 100 INJECTION, SUSPENSION SUBCUTANEOUS at 16:11

## 2021-12-17 RX ADMIN — FUROSEMIDE 40 MG: 10 INJECTION, SOLUTION INTRAVENOUS at 16:11

## 2021-12-17 RX ADMIN — HYDROXYZINE PAMOATE 25 MG: 25 CAPSULE ORAL at 16:11

## 2021-12-17 RX ADMIN — Medication 1000 MG: at 07:25

## 2021-12-17 RX ADMIN — HYDROXYZINE PAMOATE 25 MG: 25 CAPSULE ORAL at 07:27

## 2021-12-17 RX ADMIN — ATORVASTATIN CALCIUM 40 MG: 40 TABLET, FILM COATED ORAL at 20:11

## 2021-12-17 RX ADMIN — PANCRELIPASE 36000 UNITS: 60000; 12000; 38000 CAPSULE, DELAYED RELEASE PELLETS ORAL at 09:21

## 2021-12-17 RX ADMIN — PANTOPRAZOLE SODIUM 40 MG: 40 TABLET, DELAYED RELEASE ORAL at 07:26

## 2021-12-17 RX ADMIN — DULOXETINE HYDROCHLORIDE 60 MG: 60 CAPSULE, DELAYED RELEASE ORAL at 09:27

## 2021-12-17 RX ADMIN — PANCRELIPASE 36000 UNITS: 60000; 12000; 38000 CAPSULE, DELAYED RELEASE PELLETS ORAL at 16:11

## 2021-12-17 RX ADMIN — INSULIN LISPRO 2 UNITS: 100 INJECTION, SOLUTION INTRAVENOUS; SUBCUTANEOUS at 12:30

## 2021-12-17 ASSESSMENT — PAIN DESCRIPTION - LOCATION
LOCATION: BACK;CHEST
LOCATION: CHEST;BACK
LOCATION: CHEST

## 2021-12-17 ASSESSMENT — PAIN DESCRIPTION - PAIN TYPE
TYPE: CHRONIC PAIN

## 2021-12-17 ASSESSMENT — PAIN DESCRIPTION - DESCRIPTORS
DESCRIPTORS: CONSTANT;DISCOMFORT;ACHING
DESCRIPTORS: ACHING;CONSTANT;DISCOMFORT
DESCRIPTORS: ACHING;CONSTANT;DISCOMFORT

## 2021-12-17 ASSESSMENT — PAIN DESCRIPTION - PROGRESSION: CLINICAL_PROGRESSION: NOT CHANGED

## 2021-12-17 ASSESSMENT — PAIN SCALES - GENERAL
PAINLEVEL_OUTOF10: 5
PAINLEVEL_OUTOF10: 9
PAINLEVEL_OUTOF10: 7
PAINLEVEL_OUTOF10: 0
PAINLEVEL_OUTOF10: 9
PAINLEVEL_OUTOF10: 9

## 2021-12-17 ASSESSMENT — PAIN DESCRIPTION - ORIENTATION
ORIENTATION: LEFT;MID
ORIENTATION: MID;LEFT

## 2021-12-17 ASSESSMENT — PAIN DESCRIPTION - ONSET: ONSET: ON-GOING

## 2021-12-17 ASSESSMENT — PAIN - FUNCTIONAL ASSESSMENT: PAIN_FUNCTIONAL_ASSESSMENT: PREVENTS OR INTERFERES SOME ACTIVE ACTIVITIES AND ADLS

## 2021-12-17 ASSESSMENT — PAIN DESCRIPTION - FREQUENCY: FREQUENCY: CONTINUOUS

## 2021-12-17 NOTE — PROGRESS NOTES
12/13/2021     12/17/2021    CO2 23 12/17/2021    BUN 18 12/17/2021    LABALBU 3.8 12/17/2021    CREATININE 0.9 12/17/2021    CALCIUM 9.1 12/17/2021    GFRAA >60 12/17/2021    LABGLOM >60 12/17/2021    GLUCOSE 205 12/17/2021          metoprolol succinate  25 mg Oral Daily    lisinopril  5 mg Oral Daily    atorvastatin  40 mg Oral Nightly    lipase-protease-amylase  36,000 Units Oral TID WC    cefTRIAXone (ROCEPHIN) IV  1,000 mg IntraVENous Q24H    DULoxetine  60 mg Oral Daily    levothyroxine  75 mcg Oral Daily    pantoprazole  40 mg Oral QAM AC    aspirin  81 mg Oral Daily    insulin NPH  10 Units SubCUTAneous BID AC    insulin lispro  0-6 Units SubCUTAneous TID WC    insulin lispro  0-3 Units SubCUTAneous Nightly      dextrose       traMADol, glucose, dextrose, glucagon (rDNA), dextrose, HYDROcodone 5 mg - acetaminophen, hydrOXYzine, tiZANidine, dextrose    IMPRESSION/RECOMMENDATIONS:      Acute kidney injury on admission resolved   History of new Rt  bundle branch block status post diagnostic heart cath   History of recent weight gain exacerbated by IV hydration we will redose with  IV Lasix    Would suggest having  bring her meds from home may need simplification of her med list in view of potential liver disease.   Will need bladder scan after removal of Ford in view of voiding complaints.   Check orthostatic bp      Karsten Odell MD  12/17/2021 4:14 PM

## 2021-12-17 NOTE — PROGRESS NOTES
Occupational Therapy  OT BEDSIDE TREATMENT NOTE      Date:2021  Patient Name: Jose Sheehan  MRN: [id number]  : 1945  Room: [address] [place] [address]     Evaluating OT: Karen Dewitt OTR/L   [id number]       Referring Gordon August MD    Specific Provider Orders/Date:OT eval and treat        Diagnosis:  Lactic acidosis [E87.2]  NSTEMI (non-ST elevated myocardial infarction) ([facility] [I21.4]  Acute respiratory failure with hypoxia (HCC) [J96.01]  Acute on chronic combined systolic and diastolic CHF (congestive heart failure) ([facility] [I50.43]  Traumatic rhabdomyolysis, initial encounter ([facility] Josesito Shankar. 6XXA]  Diabetic ketoacidosis without coma associated with type 2 diabetes mellitus ([facility] [E11.10]  New onset right bundle branch block (RBBB) [I45.10]  Chronic kidney disease, unspecified CKD stage [N18.9]     Pertinent Medical History: DM, HTN    Precautions:  Fall Risk, alarm       Assessment of current deficits    [x]? Functional mobility            [x]?ADLs           [x]? Strength                  []?Cognition    [x]? Functional transfers          [x]? IADLs         [x]? Safety Awareness   [x]? Endurance    []? Fine Coordination                         [x]? Balance      []? Vision/perception   [x]? Sensation      []? Gross Motor Coordination             []? ROM           []?  Delirium                   []? Motor Control      OT PLAN OF CARE   OT POC based on physician orders, patient diagnosis and results of clinical assessment     Frequency/Duration  2-4 days/wk for 2 weeks PRN   Specific OT Treatment Interventions to include:   ADL retraining/adapted techniques and AE recommendations to increase functional independence within precautions                    Energy conservation techniques to improve tolerance for selfcare routine   Functional transfer/mobility training/DME recommendations for increased independence, safety and fall prevention         Patient/family education to increase safety and functional independence             Environmental modifications for safe mobility and completion of ADLs                             Therapeutic activity to improve functional performance during ADLs.                                         Therapeutic exercise to improve tolerance and functional strength for ADLs    Balance retraining/tolerance tasks for facilitation of postural control with dynamic challenges during ADLs .       Positioning to improve functional independence  []?   Recommended Adaptive Equipment: TBD      Home Living: Pt lives with , 1 story with 4 steps/rail      Prior Level of Function: Independent  with ADLs , Independent  with IADLs; ambulated with no device   Driving: yes      Pain Level: pain in trunk area, nursing aware. Cognition: A&O: 4/4;               Memory:  Good               Sequencing:  Good               Problem solving:  Fair+               Judgement/safety:  Fair +                 Functional Assessment:  AM-PAC Daily Activity Raw Score: 15/24    Initial Eval Status  Date: 12/15/21 Treatment Status  Date: 12/17/21 STGs = LTGs  Time frame: 10-14 days   Feeding set-up    Independent    Grooming SBA/set-up    Mod I    UB Dressing Min A  Min A  Supervision    LB Dressing Mod A  Min A Supervision    Bathing Mod A    Supervision    Toileting Assist with thorough hygiene    Supervision    Bed Mobility  Mod A   Supine <> sit   Supine to sit min A supevision    Functional Transfers Min A  Sit-stand from bed   sit <> stand min A Supervision    Functional Mobility Min A,w/walker  Steps to/from bed only   - patient returned to bed - d/t chest pain (nurse made aware)  O2 sats 95%  HR 70   On 2 L o2  functional mobility in room using [employer] min A Supervision  with good tolerance    Balance Sitting:     Static:  SBA     Dynamic:Mod a   Standing: Sary   Standing balance min A Supervision    Activity Tolerance Fair - with light activity  Fair - with light activity.  Patient reported feeling light headed after walking, O2 sat 86 after activity requiring 2 minute rest to increase to 93 on room air. Physician in room and made aware as well as nursing. Good  with ADL activity        Comments:  Patient cleared with nursing for participation, pleasant and agreeable. Patient fatigues quickly with exertion and requires much encouragement due to nervousness throughout session, however good participation demonstrated. Patient in chair with call light in reach and  present with alarm on at the end of the session. Education/treatment:  ADL and functional transfer/activity performed to increase safety and independence during self care tasks. Education provided on safety awareness, adl reeducation, functional transfer retraining, work simplification, breathing technique, benefits of increasing activity    · Pt has made good progress towards set goals.        Time In: 11:30  Time Out: 11:55     Min Units   Therapeutic Ex [id number]     Therapeutic Activities [id number] 25 2   ADL/Self Care 30880     Orthotic Management 36526     Neuro Re-Ed [id number]     Non-Billable Time     TOTAL TIMED TREATMENT 25 [id number] Cher Nichols PRATER/L [id number]

## 2021-12-17 NOTE — PLAN OF CARE
Problem: Pain:  Goal: Control of acute pain  Description: Control of acute pain  Outcome: Met This Shift     Problem: Skin Integrity:  Goal: Will show no infection signs and symptoms  Description: Will show no infection signs and symptoms  Outcome: Met This Shift  Goal: Absence of new skin breakdown  Description: Absence of new skin breakdown  Outcome: Met This Shift     Problem: Fluid Volume:  Goal: Will show no signs and symptoms of electrolyte imbalance  Description: Will show no signs and symptoms of electrolyte imbalance  Outcome: Met This Shift

## 2021-12-17 NOTE — PROGRESS NOTES
INPATIENT CARDIOLOGY FOLLOW-UP    Name: Prema Farnsworth    Age: 78 y.o. Date of Admission: 12/13/2021  3:18 PM    Date of Service: 12/17/2021     Follow-up:  NSTEMI  Interim History:  No new overnight cardiac complaints. Currently with no complaints of CP, SOB, palpitations, dizziness, or lightheadedness. SR on telemetry.     Review of Systems:   Cardiac: As per HPI  General: No fever, chills  Pulmonary: As per HPI  HEENT: No visual disturbances, difficult swallowing  GI: No nausea, vomiting  Endocrine: No thyroid disease or DM  Musculoskeletal: MAYO x 4, no focal motor deficits  Skin: Intact, no rashes  Neuro/Psych: No headache or seizures    Problem List:  Patient Active Problem List   Diagnosis    Subcutaneous mass    Diabetes mellitus ([facility] [address]    Syncope and collapse    Severe protein-calorie malnutrition ([facility] [address]    NSTEMI (non-ST elevated myocardial infarction) (HCC)       Allergies:  No Known Allergies    Current Medications:  Current Facility-Administered Medications   Medication Dose Route Frequency Provider Last Rate Last Admin    atorvastatin (LIPITOR) tablet 40 mg  40 mg Oral Nightly Mell Quiñones MD   40 mg at 12/16/21 2101    traMADol (ULTRAM) tablet 50 mg  50 mg Oral Q6H PRN Festus Solomon MD        lipase-protease-amylase (CREON) delayed release capsule 36,000 Units  36,000 Units Oral TID WC Festus Solomon MD   36,000 Units at 12/16/21 1633    glucose (GLUTOSE) 40 % oral gel 15 g  15 g Oral PRN Festus Solomon MD        dextrose 50 % IV solution  12.5 g IntraVENous PRN Festus Solomon MD        glucagon (rDNA) injection 1 mg  1 mg IntraMUSCular PRN Festus Solomon MD        dextrose 5 % solution  100 mL/hr IntraVENous PRN Festus Solomon MD        cefTRIAXone (ROCEPHIN) 1,000 mg in sodium chloride flush 10 mL IV syringe  1,000 mg IntraVENous Q24H Kimberly Yee MD   1,000 mg at 12/17/21 0725    HYDROcodone-acetaminophen (NORCO) 5-325 MG per tablet 1 tablet  1 tablet Oral TID PRN Marla Najera MD   1 tablet at 12/17/21 0402    DULoxetine (CYMBALTA) extended release capsule 60 mg  60 mg Oral Daily Marla Najera MD   60 mg at 12/15/21 3444    metoprolol tartrate (LOPRESSOR) tablet 25 mg  25 mg Oral BID Marla Najera MD   25 mg at 12/16/21 2101    levothyroxine (SYNTHROID) tablet 75 mcg  75 mcg Oral Daily Marla Najera MD   75 mcg at 12/17/21 0726    pantoprazole (PROTONIX) tablet 40 mg  40 mg Oral QAM AC Marla Najera MD   40 mg at 12/17/21 0726    hydrOXYzine (VISTARIL) capsule 25 mg  25 mg Oral TID PRN Marla Najera MD   25 mg at 12/17/21 0727    tiZANidine (ZANAFLEX) tablet 4 mg  4 mg Oral Q12H PRN Marla Najera MD   4 mg at 12/14/21 1541    aspirin EC tablet 81 mg  81 mg Oral Daily Marla Najera MD   81 mg at 12/15/21 0933    insulin NPH (HUMULIN N;NOVOLIN N) injection vial 10 Units  10 Units SubCUTAneous BID AC Marla Njaera MD   10 Units at 12/17/21 0725    insulin lispro (HUMALOG) injection vial 0-6 Units  0-6 Units SubCUTAneous TID WC Marla Najera MD   2 Units at 12/15/21 1220    insulin lispro (HUMALOG) injection vial 0-3 Units  0-3 Units SubCUTAneous Nightly Marla Najera MD   1 Units at 12/16/21 2104    dextrose 50 % IV solution  12.5 g IntraVENous PRN Marla Najera MD          dextrose         Physical Exam:  /66   Pulse 82   Temp 97.9 °F (36.6 °C) (Oral)   Resp 18   Ht 5' 1\" (1.549 m)   Wt 196 lb 11.2 oz (89.2 kg)   SpO2 92%   BMI 37.17 kg/m²   Wt Readings from Last 3 Encounters:   12/16/21 196 lb 11.2 oz (89.2 kg)   12/16/21 196 lb (88.9 kg)   10/13/21 169 lb 11.2 oz (77 kg)     Appearance: Awake, alert, no acute respiratory distress  Skin: Intact, no rash  Head: Normocephalic, atraumatic  Eyes: EOMI, no conjunctival erythema  ENMT: No pharyngeal erythema, MMM, no rhinorrhea  Neck: Supple, no elevated JVP, no carotid bruits  Lungs: Clear to auscultation bilaterally.  No wheezes, rales, or rhonchi. Cardiac: Regular rate and rhythm, +S1S2, no murmurs apparent  Abdomen: Soft, nontender, +bowel sounds  Extremities: Moves all extremities x 4, no lower extremity edema  Neurologic: No focal motor deficits apparent, normal mood and affect  Peripheral Pulses: Intact posterior tibial pulses bilaterally    Intake/Output:    Intake/Output Summary (Last 24 hours) at 12/17/2021 0901  Last data filed at 12/16/2021 1959  Gross per 24 hour   Intake    Output 3050 ml   Net -3050 ml     No intake/output data recorded.     Laboratory Tests:  Recent Labs     12/15/21  0530 12/16/21  0339 12/17/21  0420    138 142   K 3.1* 4.0 4.3    106 106   CO2 16* 20* 23   BUN 30* 26* 18   CREATININE 1.1* 1.0 0.9   GLUCOSE 95 131* 205*   CALCIUM 7.8* 8.4* 9.1     Lab Results   Component Value Date    MG 1.7 12/17/2021     Recent Labs     12/15/21  0530 12/16/21 0339 12/17/21  0420   ALKPHOS 88 96 106*   ALT 40* 49* 37*   AST 45* 36* 23   PROT 5.7* 6.0* 6.3*   BILITOT 0.3 0.3 0.5   LABALBU 3.2* 3.4* 3.8     Recent Labs     12/15/21  0530 12/16/21  0339 12/17/21  0420   WBC 8.8 6.3 6.3   RBC 3.61 3.60 4.23   HGB 11.1* 11.5 13.0   HCT 34.1 34.5 40.3   MCV 94.5 95.8 95.3   MCH 30.7 31.9 30.7   MCHC 32.6 33.3 32.3   RDW 14.5 14.6 14.4    202 257   MPV 10.5 10.9 10.3     Lab Results   Component Value Date    CKTOTAL 1,547 (H) 12/13/2021    CKMB 3.8 08/19/2015    TROPONINI <0.01 07/09/2019    TROPONINI <0.01 02/25/2017    TROPONINI 0.01 08/19/2015     Lab Results   Component Value Date    INR 1.0 06/14/2021    INR 1.2 07/09/2019    INR 1.0 08/19/2015    PROTIME 11.1 06/14/2021    PROTIME 14.1 (H) 07/09/2019    PROTIME 11.2 08/19/2015     Lab Results   Component Value Date    TSH 1.480 06/15/2021     Lab Results   Component Value Date    LABA1C 6.5 (H) 12/14/2021     No results found for: EAG  Lab Results   Component Value Date    CHOL 164 11/13/2015     Lab Results   Component Value Date    TRIG 52 11/13/2015     Lab Results   Component Value Date    HDL 77 11/13/2015     Lab Results   Component Value Date    LDLCALC 77 11/13/2015     Lab Results   Component Value Date    LABVLDL 10 11/13/2015     No results found for: [facility]    Cardiac Tests:  Telemetry findings reviewed: SR at rate 80s, no new tachy/bradyarrhythmias overnight     EKG: Normal sinus rhythm, first-degree block, right bundle branch block, poor R wave progression, abnormal EKG.    Vitals and labs were reviewed: Blood pressure 112/72 heart rate 76 O2 sats 98% on 2 L.     LabsBUNs/creatinine 49/1.8 (baseline 1.2)>> 43/1.5>>30/1.1, K 3.1 electrolytes normal.  Lactic acid 3.8>> 2.8, total CK 1547, proBNP 50,183>>30988, troponin, high-sensitivity 1215> 964, AST/ALT 84/45. Beta hydroxybutyrate 1.98, A1c 6.5 H&H 11.9/36.8>> 10.3/31.8>>11.1/34.1. Urinalysis was positive for trace amount of ketones moderate blood small amount of leukocyte Estrace nitrates negative WBC 10-20 RBC 10-20 moderate bacteria.     12/13/2021 CXR:  Pulmonary vascular congestion suspect early CHF.  Please correlate clinically.     12/13/2021 Pelvic XRay:  No acute bony abnormality.     12/13/2021 CT Head:  No acute intercranial abnormality.     12/13/2021 CT of Cervical Spine:  No acute abnormality of the cervical spine. Multilevel degenerative changes, as noted above      12/13/2021 CTA Chest:  No evidence of pulmonary embolism. Suspect cardiomegaly and pulmonary vascular congestion.     12/13/2021 CT of Abdomen and Pelvis:  No acute process in the abdomen and pelvis  Probable cirrhotic liver with hemangioma in the left lobe. 2 cm left renal cyst.  Constipation  Fat-containing ventral hernia, upper abdomen  Bibasilar atelectasis, lung bases.       TTE- 12/14/2021:   Left ventricle size is normal.   Ejection fraction is visually estimated at 40-45%. Overall ejection fraction mild-to-moderately decreased . Mid to basal inferolateral and inferior walls are severely hypokinetic. Normal left ventricle wall thickness. Stage I diastolic dysfunction. Mildly dilated right ventricle. Right ventricle global systolic function is mildly reduced . TAPSE 14 mm. No hemodynamically significant aortic stenosis is present. Mild to moderate tricuspid regurgitation. RVSP is 38 mmHg. Normal estimated PA systolic pressure. No evidence for hemodynamically significant pericardial effusion. Technically difficult examination. Definity echo contrast was used to   delineate endocardial borders. Cardiac cath12/16/2021:  Findings:  Left main: no significant disease  LAD: 100 % stenosis. Patent LIMA  Circumflex: no significant disease  RCA: Dominant. No significant disease. LV angio: 45-50%  ejection fraction     Hemodynamics:  LV: 111 mmHg. LVEDP 13  No gradient across AV. Ao: 102/44 ( 64 ).    Assessment:  · New onset LV dysfunction EF 40-45 with regional wall motion abnormalities and elevated troponin secondary to NSTEMI. Showed patent LIMA no other significant obstructive lesions. 100% occluded LAD. · Ischemic cardiomyopathy EF 40 to 45%  · Noncardiac chest pain which appears to be secondary to chest trauma. · History of CAD status post CABG x1 LIMA to LADOctober 2016  · Elevated troponin without chest pain or acute EKG changes in the setting of ZAMZAM and some rhabdo myelitis. · Persistent chest pain, musculoskeletal.  · Elevated proBNP secondary to ischemic cardiomyopathy  · Frequent mechanical falls  · Hypertension, now blood pressures are low mostly from dehydration  · CKD with ZAMZAM, improving>> resolved BUNs/creatinine 18/29  · Type 2 diabetes with DKA, improved. · Hyperlipidemia  · Tobacco use disorder  · Hypothyroidism on HRT  · Chronic lower back pain  · History of resting tremors  · Chronic pain syndrome  · Hemodynamically stable blood pressure 133/66 heart rate 82        Plan:   · Continue aspirin and statin.  Increase Atorvastatin to 40 mg   · Cardiac catheter results were reviewed with the patient. · Echocardiogram to assess LV function showed EF 40 to 45%. We will initiate her on guideline directed medical therapy. Change metoprolol tartrate to Toprol-XL 25 mg p.o. twice daily. Start lisinopril 5 mg p.o. daily. Check BMP in a.m. Use Lasix as needed. · Evaluation of mechanical falls as per primary service. · If she remains hemodynamically stable renal functions stays stable then she can be discharged home from the cardiology standpoint in a.m. · Follow-up with Dr. Varinder Salgado in 1 month      Willian Crawford MD., Rylee Kaba.   [facility]) Cardiology

## 2021-12-17 NOTE — CARE COORDINATION
Met with patient- informed her that [facility] has accepted. Pt ageeable to discharge plan. Await precert. Pt will need negative Covid prior to discharge.

## 2021-12-17 NOTE — PROGRESS NOTES
Subjective:    Chief complaint:    Awake  Pleasantly confused   is by the bed side    Objective:    /72   Pulse 71   Temp 96.6 °F (35.9 °C) (Axillary)   Resp 14   Ht 5' 1\" (1.549 m)   Wt 196 lb 11.2 oz (89.2 kg)   SpO2 95%   BMI 37.17 kg/m²   General : Awake, pleasantly confused  Heart:  RRR, no murmurs, gallops, or rubs. Lungs:  CTA bilaterally, no wheeze, rales or rhonchi  Abd: bowel sounds present, nontender, nondistended, no masses  Extrem:  No clubbing, cyanosis, or edema    CBC:   Lab Results   Component Value Date    WBC 6.3 12/17/2021    RBC 4.23 12/17/2021    HGB 13.0 12/17/2021    HCT 40.3 12/17/2021    MCV 95.3 12/17/2021    MCH 30.7 12/17/2021    MCHC 32.3 12/17/2021    RDW 14.4 12/17/2021     12/17/2021    MPV 10.3 12/17/2021     BMP:    Lab Results   Component Value Date     12/17/2021    K 4.3 12/17/2021    K 4.3 12/13/2021     12/17/2021    CO2 23 12/17/2021    BUN 18 12/17/2021    LABALBU 3.8 12/17/2021    CREATININE 0.9 12/17/2021    CALCIUM 9.1 12/17/2021    GFRAA >60 12/17/2021    LABGLOM >60 12/17/2021    GLUCOSE 205 12/17/2021     PT/INR:    Lab Results   Component Value Date    PROTIME 11.1 06/14/2021    INR 1.0 06/14/2021     Troponin:    Lab Results   Component Value Date    TROPONINI <0.01 07/09/2019       No results for input(s): LABURIN in the last 72 hours. No results for input(s): BC in the last 72 hours. No results for input(s): Derryl Drum in the last 72 hours.       Current Facility-Administered Medications:     metoprolol succinate (TOPROL XL) extended release tablet 25 mg, 25 mg, Oral, Daily, Denton Parra MD, 25 mg at 12/17/21 0921    lisinopril (PRINIVIL;ZESTRIL) tablet 5 mg, 5 mg, Oral, Daily, Denton Parra MD, 5 mg at 12/17/21 0921    atorvastatin (LIPITOR) tablet 40 mg, 40 mg, Oral, Nightly, Denton Parra MD, 40 mg at 12/16/21 2101    traMADol (ULTRAM) tablet 50 mg, 50 mg, Oral, Q6H PRN, Aurora Lima MD  [facility] lipase-protease-amylase (CREON) delayed release capsule 36,000 Units, 36,000 Units, Oral, TID WC, Farida Rivera MD, 36,000 Units at 12/17/21 0921    glucose (GLUTOSE) 40 % oral gel 15 g, 15 g, Oral, PRN, Farida Rivera MD    dextrose 50 % IV solution, 12.5 g, IntraVENous, PRN, Farida Rivera MD    glucagon (rDNA) injection 1 mg, 1 mg, IntraMUSCular, PRN, Farida Rivera MD    dextrose 5 % solution, 100 mL/hr, IntraVENous, PRN, Farida Rivera MD    cefTRIAXone (ROCEPHIN) 1,000 mg in sodium chloride flush 10 mL IV syringe, 1,000 mg, IntraVENous, Q24H, Geni Sosa MD, 1,000 mg at 12/17/21 0725    HYDROcodone-acetaminophen (NORCO) 5-325 MG per tablet 1 tablet, 1 tablet, Oral, TID PRN, Geni Sosa MD, 1 tablet at 12/17/21 0402    DULoxetine (CYMBALTA) extended release capsule 60 mg, 60 mg, Oral, Daily, Geni Sosa MD, 60 mg at 12/17/21 8825    levothyroxine (SYNTHROID) tablet 75 mcg, 75 mcg, Oral, Daily, Geni Sosa MD, 75 mcg at 12/17/21 0726    pantoprazole (PROTONIX) tablet 40 mg, 40 mg, Oral, QAM AC, Geni Sosa MD, 40 mg at 12/17/21 0726    hydrOXYzine (VISTARIL) capsule 25 mg, 25 mg, Oral, TID PRN, Geni Sosa MD, 25 mg at 12/17/21 0727    tiZANidine (ZANAFLEX) tablet 4 mg, 4 mg, Oral, Q12H PRN, Geni Sosa MD, 4 mg at 12/14/21 1541    aspirin EC tablet 81 mg, 81 mg, Oral, Daily, Geni Sosa MD, 81 mg at 12/17/21 0921    insulin NPH (HUMULIN N;NOVOLIN N) injection vial 10 Units, 10 Units, SubCUTAneous, BID AC, Geni Sosa MD, 10 Units at 12/17/21 0725    insulin lispro (HUMALOG) injection vial 0-6 Units, 0-6 Units, SubCUTAneous, TID WC, Geni Sosa MD, 2 Units at 12/15/21 1220    insulin lispro (HUMALOG) injection vial 0-3 Units, 0-3 Units, SubCUTAneous, Nightly, Geni Sosa MD, 1 Units at 12/16/21 2104    dextrose 50 % IV solution, 12.5 g, IntraVENous, PRN, Geni Sosa MD    ADULT DIET;  Regular; 4 carb choices (60 gm/meal); Low Sodium (2 gm)    CT ABDOMEN PELVIS WO CONTRAST Additional Contrast? None   Final Result   No acute process in the abdomen and pelvis. Probable cirrhotic liver with hemangioma in the left lobe. 2 cm left renal cyst.      Constipation. Fat containing ventral hernia, upper abdomen. Bibasilar atelectasis, lung bases. RECOMMENDATIONS:   Unavailable         CT HEAD WO CONTRAST   Final Result   No acute intracranial abnormality. RECOMMENDATIONS:   Unavailable         CT CERVICAL SPINE WO CONTRAST   Final Result   No acute abnormality of the cervical spine. Multilevel degenerative changes, as noted above. RECOMMENDATIONS:   Unavailable         CTA CHEST W CONTRAST   Final Result   No evidence of pulmonary embolism. Suspect cardiomegaly and pulmonary vascular congestion. RECOMMENDATIONS:   Unavailable         XR CHEST PORTABLE   Final Result   Pulmonary vascular congestion and suspect early CHF. Please correlate   clinically. XR PELVIS (1-2 VIEWS)   Final Result   No acute bony abnormality. Assessment:    Active Problems:    NSTEMI (non-ST elevated myocardial infarction) ([facility] [address]  Resolved Problems:    * No resolved hospital problems. *  LV systolic dysfunction  Reviewed creatinine improved  Hypokalemia  Chronic  Pain syndrome  Underlying history of anxiety/depression  DKA resolved  Diabetes mellitus type 2  Transaminitis -LFTs improving  metabolic encephalopathy  E.coli UTI    Plan:    Still dropping sat with activity- will need O2 on dc  Renal functio so far stable after cath  Dc to [facility] once bed available  Pre-certification process underway  Heart cath revealed CAD- medical management  Discussed with  in detail    Phillip Odell MD  12:08 PM  12/17/2021    NOTE: This report was transcribed using voice recognition software.  Every effort was made to ensure accuracy; however, inadvertent transcription errors may be present

## 2021-12-18 LAB
ALBUMIN SERPL-MCNC: 3.3 G/DL (ref 3.5–5.2)
ALP BLD-CCNC: 93 U/L (ref 35–104)
ALT SERPL-CCNC: 25 U/L (ref 0–32)
ANION GAP SERPL CALCULATED.3IONS-SCNC: 16 MMOL/L (ref 7–16)
APTT: 27.4 SEC (ref 24.5–35.1)
AST SERPL-CCNC: 19 U/L (ref 0–31)
BASOPHILS ABSOLUTE: 0.04 E9/L (ref 0–0.2)
BASOPHILS RELATIVE PERCENT: 0.6 % (ref 0–2)
BILIRUB SERPL-MCNC: 0.4 MG/DL (ref 0–1.2)
BUN BLDV-MCNC: 20 MG/DL (ref 6–23)
CALCIUM SERPL-MCNC: 8.9 MG/DL (ref 8.6–10.2)
CHLORIDE BLD-SCNC: 108 MMOL/L (ref 98–107)
CO2: 22 MMOL/L (ref 22–29)
CREAT SERPL-MCNC: 1 MG/DL (ref 0.5–1)
EOSINOPHILS ABSOLUTE: 0.22 E9/L (ref 0.05–0.5)
EOSINOPHILS RELATIVE PERCENT: 3.5 % (ref 0–6)
GFR AFRICAN AMERICAN: >60
GFR NON-AFRICAN AMERICAN: 54 ML/MIN/1.73
GLUCOSE BLD-MCNC: 168 MG/DL (ref 74–99)
HCT VFR BLD CALC: 37.5 % (ref 34–48)
HEMOGLOBIN: 12.4 G/DL (ref 11.5–15.5)
IMMATURE GRANULOCYTES #: 0.03 E9/L
IMMATURE GRANULOCYTES %: 0.5 % (ref 0–5)
LYMPHOCYTES ABSOLUTE: 2.3 E9/L (ref 1.5–4)
LYMPHOCYTES RELATIVE PERCENT: 36.9 % (ref 20–42)
MAGNESIUM: 1.5 MG/DL (ref 1.6–2.6)
MCH RBC QN AUTO: 30.9 PG (ref 26–35)
MCHC RBC AUTO-ENTMCNC: 33.1 % (ref 32–34.5)
MCV RBC AUTO: 93.5 FL (ref 80–99.9)
METER GLUCOSE: 160 MG/DL (ref 74–99)
METER GLUCOSE: 193 MG/DL (ref 74–99)
METER GLUCOSE: 238 MG/DL (ref 74–99)
METER GLUCOSE: 85 MG/DL (ref 74–99)
MONOCYTES ABSOLUTE: 0.59 E9/L (ref 0.1–0.95)
MONOCYTES RELATIVE PERCENT: 9.5 % (ref 2–12)
NEUTROPHILS ABSOLUTE: 3.05 E9/L (ref 1.8–7.3)
NEUTROPHILS RELATIVE PERCENT: 49 % (ref 43–80)
PDW BLD-RTO: 14.3 FL (ref 11.5–15)
PHOSPHORUS: 2.7 MG/DL (ref 2.5–4.5)
PLATELET # BLD: 249 E9/L (ref 130–450)
PMV BLD AUTO: 10 FL (ref 7–12)
POTASSIUM SERPL-SCNC: 3.7 MMOL/L (ref 3.5–5)
RBC # BLD: 4.01 E12/L (ref 3.5–5.5)
SODIUM BLD-SCNC: 146 MMOL/L (ref 132–146)
TOTAL PROTEIN: 5.9 G/DL (ref 6.4–8.3)
WBC # BLD: 6.2 E9/L (ref 4.5–11.5)

## 2021-12-18 PROCEDURE — 36415 COLL VENOUS BLD VENIPUNCTURE: CPT

## 2021-12-18 PROCEDURE — 84100 ASSAY OF PHOSPHORUS: CPT

## 2021-12-18 PROCEDURE — 6370000000 HC RX 637 (ALT 250 FOR IP): Performed by: INTERNAL MEDICINE

## 2021-12-18 PROCEDURE — 85730 THROMBOPLASTIN TIME PARTIAL: CPT

## 2021-12-18 PROCEDURE — 85025 COMPLETE CBC W/AUTO DIFF WBC: CPT

## 2021-12-18 PROCEDURE — 6360000002 HC RX W HCPCS: Performed by: INTERNAL MEDICINE

## 2021-12-18 PROCEDURE — 82962 GLUCOSE BLOOD TEST: CPT

## 2021-12-18 PROCEDURE — 2580000003 HC RX 258: Performed by: INTERNAL MEDICINE

## 2021-12-18 PROCEDURE — 6370000000 HC RX 637 (ALT 250 FOR IP): Performed by: NURSE PRACTITIONER

## 2021-12-18 PROCEDURE — 83735 ASSAY OF MAGNESIUM: CPT

## 2021-12-18 PROCEDURE — 6360000002 HC RX W HCPCS: Performed by: NURSE PRACTITIONER

## 2021-12-18 PROCEDURE — 2060000000 HC ICU INTERMEDIATE R&B

## 2021-12-18 PROCEDURE — 80053 COMPREHEN METABOLIC PANEL: CPT

## 2021-12-18 RX ORDER — MAGNESIUM SULFATE IN WATER 40 MG/ML
2000 INJECTION, SOLUTION INTRAVENOUS ONCE
Status: COMPLETED | OUTPATIENT
Start: 2021-12-18 | End: 2021-12-18

## 2021-12-18 RX ORDER — POTASSIUM CHLORIDE 20 MEQ/1
20 TABLET, EXTENDED RELEASE ORAL ONCE
Status: COMPLETED | OUTPATIENT
Start: 2021-12-18 | End: 2021-12-18

## 2021-12-18 RX ADMIN — INSULIN HUMAN 10 UNITS: 100 INJECTION, SUSPENSION SUBCUTANEOUS at 06:43

## 2021-12-18 RX ADMIN — LEVOTHYROXINE SODIUM 75 MCG: 75 TABLET ORAL at 06:42

## 2021-12-18 RX ADMIN — PANTOPRAZOLE SODIUM 40 MG: 40 TABLET, DELAYED RELEASE ORAL at 06:42

## 2021-12-18 RX ADMIN — LISINOPRIL 5 MG: 5 TABLET ORAL at 08:44

## 2021-12-18 RX ADMIN — MAGNESIUM SULFATE HEPTAHYDRATE 2000 MG: 2 INJECTION, SOLUTION INTRAVENOUS at 10:50

## 2021-12-18 RX ADMIN — INSULIN LISPRO 1 UNITS: 100 INJECTION, SOLUTION INTRAVENOUS; SUBCUTANEOUS at 12:07

## 2021-12-18 RX ADMIN — INSULIN LISPRO 2 UNITS: 100 INJECTION, SOLUTION INTRAVENOUS; SUBCUTANEOUS at 17:08

## 2021-12-18 RX ADMIN — HYDROCODONE BITARTRATE AND ACETAMINOPHEN 1 TABLET: 5; 325 TABLET ORAL at 04:52

## 2021-12-18 RX ADMIN — TRAMADOL HYDROCHLORIDE 50 MG: 50 TABLET, COATED ORAL at 10:51

## 2021-12-18 RX ADMIN — INSULIN HUMAN 10 UNITS: 100 INJECTION, SUSPENSION SUBCUTANEOUS at 17:08

## 2021-12-18 RX ADMIN — ASPIRIN 81 MG: 81 TABLET, COATED ORAL at 10:58

## 2021-12-18 RX ADMIN — DULOXETINE HYDROCHLORIDE 60 MG: 60 CAPSULE, DELAYED RELEASE ORAL at 08:44

## 2021-12-18 RX ADMIN — METOPROLOL SUCCINATE 25 MG: 25 TABLET, EXTENDED RELEASE ORAL at 08:44

## 2021-12-18 RX ADMIN — HYDROCODONE BITARTRATE AND ACETAMINOPHEN 1 TABLET: 5; 325 TABLET ORAL at 17:08

## 2021-12-18 RX ADMIN — PANCRELIPASE 36000 UNITS: 60000; 12000; 38000 CAPSULE, DELAYED RELEASE PELLETS ORAL at 18:03

## 2021-12-18 RX ADMIN — Medication 1000 MG: at 06:42

## 2021-12-18 RX ADMIN — INSULIN LISPRO 1 UNITS: 100 INJECTION, SOLUTION INTRAVENOUS; SUBCUTANEOUS at 08:45

## 2021-12-18 RX ADMIN — ATORVASTATIN CALCIUM 40 MG: 40 TABLET, FILM COATED ORAL at 20:45

## 2021-12-18 RX ADMIN — POTASSIUM CHLORIDE 20 MEQ: 1500 TABLET, EXTENDED RELEASE ORAL at 10:51

## 2021-12-18 RX ADMIN — TRAMADOL HYDROCHLORIDE 50 MG: 50 TABLET, COATED ORAL at 20:45

## 2021-12-18 RX ADMIN — PANCRELIPASE 36000 UNITS: 60000; 12000; 38000 CAPSULE, DELAYED RELEASE PELLETS ORAL at 13:36

## 2021-12-18 RX ADMIN — PANCRELIPASE 36000 UNITS: 60000; 12000; 38000 CAPSULE, DELAYED RELEASE PELLETS ORAL at 08:44

## 2021-12-18 ASSESSMENT — PAIN - FUNCTIONAL ASSESSMENT
PAIN_FUNCTIONAL_ASSESSMENT: PREVENTS OR INTERFERES SOME ACTIVE ACTIVITIES AND ADLS

## 2021-12-18 ASSESSMENT — PAIN DESCRIPTION - ORIENTATION
ORIENTATION: RIGHT;LEFT
ORIENTATION: LEFT;RIGHT
ORIENTATION: LEFT;MID

## 2021-12-18 ASSESSMENT — PAIN SCALES - GENERAL
PAINLEVEL_OUTOF10: 5
PAINLEVEL_OUTOF10: 0
PAINLEVEL_OUTOF10: 3
PAINLEVEL_OUTOF10: 7
PAINLEVEL_OUTOF10: 0
PAINLEVEL_OUTOF10: 9
PAINLEVEL_OUTOF10: 5
PAINLEVEL_OUTOF10: 9

## 2021-12-18 ASSESSMENT — PAIN DESCRIPTION - FREQUENCY
FREQUENCY: CONTINUOUS

## 2021-12-18 ASSESSMENT — PAIN DESCRIPTION - LOCATION
LOCATION: BACK;CHEST
LOCATION: BACK
LOCATION: BACK

## 2021-12-18 ASSESSMENT — PAIN DESCRIPTION - ONSET
ONSET: ON-GOING

## 2021-12-18 ASSESSMENT — PAIN DESCRIPTION - PROGRESSION
CLINICAL_PROGRESSION: NOT CHANGED

## 2021-12-18 ASSESSMENT — PAIN DESCRIPTION - DESCRIPTORS
DESCRIPTORS: ACHING;CONSTANT;DISCOMFORT
DESCRIPTORS: ACHING;DISCOMFORT;CONSTANT
DESCRIPTORS: ACHING;CONSTANT;DISCOMFORT

## 2021-12-18 ASSESSMENT — PAIN DESCRIPTION - PAIN TYPE
TYPE: CHRONIC PAIN

## 2021-12-18 NOTE — PROGRESS NOTES
[facility] Medicine    Subjective:  Pt alert conversive  at bedside      Current Facility-Administered Medications:     magnesium sulfate 2000 mg in 50 mL IVPB premix, 2,000 mg, IntraVENous, Once, YVONNE Barker CNP, Last Rate: 25 mL/hr at 12/18/21 1050, 2,000 mg at 12/18/21 1050    metoprolol succinate (TOPROL XL) extended release tablet 25 mg, 25 mg, Oral, Daily, Nader Dawn MD, 25 mg at 12/18/21 0844    lisinopril (PRINIVIL;ZESTRIL) tablet 5 mg, 5 mg, Oral, Daily, Nader Dawn MD, 5 mg at 12/18/21 0844    atorvastatin (LIPITOR) tablet 40 mg, 40 mg, Oral, Nightly, Nader Dawn MD, 40 mg at 12/17/21 2011    traMADol (ULTRAM) tablet 50 mg, 50 mg, Oral, Q6H PRN, Fe Villafana MD, 50 mg at 12/18/21 1051    lipase-protease-amylase (CREON) delayed release capsule 36,000 Units, 36,000 Units, Oral, TID WC, Fe Villafana MD, 36,000 Units at 12/18/21 0844    glucose (GLUTOSE) 40 % oral gel 15 g, 15 g, Oral, PRN, Fe Villafana MD    dextrose 50 % IV solution, 12.5 g, IntraVENous, PRN, Fe Villafana MD    glucagon (rDNA) injection 1 mg, 1 mg, IntraMUSCular, PRN, Fe Villafana MD    dextrose 5 % solution, 100 mL/hr, IntraVENous, PRN, Fe Villafana MD    cefTRIAXone (ROCEPHIN) 1,000 mg in sodium chloride flush 10 mL IV syringe, 1,000 mg, IntraVENous, Q24H, Debora Donovan MD, 1,000 mg at 12/18/21 0642    HYDROcodone-acetaminophen (NORCO) 5-325 MG per tablet 1 tablet, 1 tablet, Oral, TID PRN, Debora Donovan MD, 1 tablet at 12/18/21 0452    DULoxetine (CYMBALTA) extended release capsule 60 mg, 60 mg, Oral, Daily, Debora Donovan MD, 60 mg at 12/18/21 0844    levothyroxine (SYNTHROID) tablet 75 mcg, 75 mcg, Oral, Daily, Debora Donovan MD, 75 mcg at 12/18/21 0642    pantoprazole (PROTONIX) tablet 40 mg, 40 mg, Oral, QAM AC, Debora Donovan MD, 40 mg at 12/18/21 2909    hydrOXYzine (VISTARIL) capsule 25 mg, 25 mg, Oral, TID PRN, Geni Sweeney Saúl Mitchell MD, 25 mg at 12/17/21 1611    tiZANidine (ZANAFLEX) tablet 4 mg, 4 mg, Oral, Q12H PRN, Kimberly Yee MD, 4 mg at 12/14/21 1541    aspirin EC tablet 81 mg, 81 mg, Oral, Daily, Kimberly Yee MD, 81 mg at 12/18/21 1058    insulin NPH (HUMULIN N;NOVOLIN N) injection vial 10 Units, 10 Units, SubCUTAneous, BID AC, Kimberly Yee MD, 10 Units at 12/18/21 0643    insulin lispro (HUMALOG) injection vial 0-6 Units, 0-6 Units, SubCUTAneous, TID WC, Kimberly Yee MD, 1 Units at 12/18/21 0845    insulin lispro (HUMALOG) injection vial 0-3 Units, 0-3 Units, SubCUTAneous, Nightly, Kimberly Yee MD, 1 Units at 12/17/21 2017    dextrose 50 % IV solution, 12.5 g, IntraVENous, PRN, Kimberly Yee MD    Objective:    /67   Pulse 90   Temp 97.9 °F (36.6 °C) (Oral)   Resp 17   Ht 5' 1\" (1.549 m)   Wt 190 lb 6.4 oz (86.4 kg)   SpO2 95%   BMI 35.98 kg/m²     Heart:  reg  Lungs:  ctab  Abd: + bs soft nontender  Extrem:  Edema legs    CBC with Differential:    Lab Results   Component Value Date    WBC 6.2 12/18/2021    RBC 4.01 12/18/2021    HGB 12.4 12/18/2021    HCT 37.5 12/18/2021     12/18/2021    MCV 93.5 12/18/2021    MCH 30.9 12/18/2021    MCHC 33.1 12/18/2021    RDW 14.3 12/18/2021    LYMPHOPCT 36.9 12/18/2021    MONOPCT 9.5 12/18/2021    BASOPCT 0.6 12/18/2021    MONOSABS 0.59 12/18/2021    LYMPHSABS 2.30 12/18/2021    EOSABS 0.22 12/18/2021    BASOSABS 0.04 12/18/2021     CMP:    Lab Results   Component Value Date     12/18/2021    K 3.7 12/18/2021    K 4.3 12/13/2021     12/18/2021    CO2 22 12/18/2021    BUN 20 12/18/2021    CREATININE 1.0 12/18/2021    GFRAA >60 12/18/2021    LABGLOM 54 12/18/2021    GLUCOSE 168 12/18/2021    PROT 5.9 12/18/2021    LABALBU 3.3 12/18/2021    CALCIUM 8.9 12/18/2021    BILITOT 0.4 12/18/2021    ALKPHOS 93 12/18/2021    AST 19 12/18/2021    ALT 25 12/18/2021     Warfarin PT/INR:    Lab Results   Component Value Date    INR 1.0 06/14/2021    INR 1.2 07/09/2019    INR 1.0 08/19/2015    PROTIME 11.1 06/14/2021    PROTIME 14.1 (H) 07/09/2019    PROTIME 11.2 08/19/2015       Assessment:    Active Problems:    NSTEMI (non-ST elevated myocardial infarction) ([facility] [address]  Resolved Problems:    * No resolved hospital problems.  *  lv syst dysfunction cad hyperlipidemia dm2    Plan:  Cont ace inhib bblocker increase activity dc planning to rehab        Nany Crook DO  11:05 AM  12/18/2021

## 2021-12-18 NOTE — PLAN OF CARE
Problem: Pain:  Goal: Pain level will decrease  Description: Pain level will decrease  Outcome: Met This Shift  Goal: Control of acute pain  Description: Control of acute pain  12/18/2021 0057 by Dee Mcnair RN  Outcome: Met This Shift  12/17/2021 1432 by Marcelle Dixon RN  Outcome: Met This Shift  Goal: Control of chronic pain  Description: Control of chronic pain  Outcome: Met This Shift     Problem: Skin Integrity:  Goal: Will show no infection signs and symptoms  Description: Will show no infection signs and symptoms  12/18/2021 0057 by Dee Mcnair RN  Outcome: Met This Shift  12/17/2021 1432 by Marcelle Dixon RN  Outcome: Met This Shift  Goal: Absence of new skin breakdown  Description: Absence of new skin breakdown  12/18/2021 0057 by Dee Mcnair RN  Outcome: Met This Shift  12/17/2021 1432 by Marcelle Dixon RN  Outcome: Met This Shift     Problem: Fluid Volume:  Goal: Will show no signs and symptoms of electrolyte imbalance  Description: Will show no signs and symptoms of electrolyte imbalance  12/17/2021 1432 by Marcelle Dixon RN  Outcome: Met This Shift

## 2021-12-18 NOTE — PROGRESS NOTES
NEPHROLOGY Attending   Progress Note  12/18/2021 9:43 AM  Subjective:   Admit Date: 12/13/2021  PCP: Aleks Barillas MD    Interval History:     12/18/21: No new overnight issues - currently with no complaints of CP / SOB - stable vitals      Diet: ADULT DIET; Regular; 4 carb choices (60 gm/meal); Low Sodium (2 gm)    Data:   Scheduled Meds:   metoprolol succinate  25 mg Oral Daily    lisinopril  5 mg Oral Daily    atorvastatin  40 mg Oral Nightly    lipase-protease-amylase  36,000 Units Oral TID WC    cefTRIAXone (ROCEPHIN) IV  1,000 mg IntraVENous Q24H    DULoxetine  60 mg Oral Daily    levothyroxine  75 mcg Oral Daily    pantoprazole  40 mg Oral QAM AC    aspirin  81 mg Oral Daily    insulin NPH  10 Units SubCUTAneous BID AC    insulin lispro  0-6 Units SubCUTAneous TID WC    insulin lispro  0-3 Units SubCUTAneous Nightly     Continuous Infusions:   dextrose       PRN Meds:traMADol, glucose, dextrose, glucagon (rDNA), dextrose, HYDROcodone 5 mg - acetaminophen, hydrOXYzine, tiZANidine, dextrose    Intake/Output Summary (Last 24 hours) at 12/18/2021 0943  Last data filed at 12/18/2021 0452  Gross per 24 hour   Intake    Output 2625 ml   Net -2625 ml     CBC:   Recent Labs     12/16/21  0339 12/17/21  0420 12/18/21  0445   WBC 6.3 6.3 6.2   HGB 11.5 13.0 12.4    257 249     BMP:    Recent Labs     12/16/21  0339 12/17/21  0420 12/18/21  0445    142 146   K 4.0 4.3 3.7    106 108*   CO2 20* 23 22   BUN 26* 18 20   CREATININE 1.0 0.9 1.0   GLUCOSE 131* 205* 168*     Hepatic:   Recent Labs     12/16/21  0339 12/17/21  0420 12/18/21  0445   AST 36* 23 19   ALT 49* 37* 25   BILITOT 0.3 0.5 0.4   ALKPHOS 96 106* 93     Troponin: No results for input(s): TROPONINI in the last 72 hours. BNP: No results for input(s): BNP in the last 72 hours. Lipids: No results for input(s): CHOL, HDL in the last 72 hours.     Invalid input(s): LDLCALCU  ABGs: No results found for: PHART, PO2ART, WPD2NJL  INR: No results for input(s): INR in the last 72 hours. -----------------------------------------------------------------  RAD: No results found. Objective:   Vitals:   Vitals:    12/18/21 0842   BP: 119/67   Pulse: 90   Resp: 17   Temp: 97.9 °F (36.6 °C)   SpO2: 95%     Patient Vitals for the past 24 hrs:   BP Temp Temp src Pulse Resp SpO2 Weight   12/18/21 0842 119/67 97.9 °F (36.6 °C) Oral 90 17 95 %    12/18/21 0452 108/69 97.8 °F (36.6 °C) Oral 78 16 95 % 190 lb 6.4 oz (86.4 kg)   12/17/21 2011 111/70 97.2 °F (36.2 °C) Oral 74 16 95 %    12/17/21 1515 110/67 97.6 °F (36.4 °C) Oral 76 16 96 %      Appearance: Awake, alert, no acute respiratory distress  Skin: Intact, no rash  Head: Normocephalic, atraumatic  Eyes: EOMI, no conjunctival erythema  ENMT: No pharyngeal erythema, MMM, no rhinorrhea  Neck: Supple, no elevated JVP, no carotid bruits  Lungs: Clear to auscultation bilaterally. No wheezes, rales, or rhonchi. Cardiac: Regular rate and rhythm, +S1S2, no murmurs apparent  Abdomen: Soft, nontender, +bowel sounds  Extremities: Moves all extremities x 4, no lower extremity edema  Neurologic: No focal motor deficits apparent, normal mood and affect  Peripheral Pulses: Intact posterior tibial pulses bilaterally      Assessment/Plan:   1) Acute kidney injury on admission   Resolved   Follow labs and I/Os     2) Ischemic Cardiomyopathy  status post diagnostic heart cath   Ischemic cardiomyopathy EF 40 to 45%  Follow on Toprol-XL 25 mg p.o. twice daily / lisinopril 5 mg p.o. daily   Use Lasix as needed. Cardiology following    3) DM Type 2   Per the primary     4) Hypokalemia  Supplement today for goal >4.0  Follow Mg closely as well             Ellie Heading, APRN - CNP     Patient seen and examined. Chart reviewed. I had a face to face encounter with the patient. Agree with exam.    Agree with  formulation, assessment and plan as outlined above and directed by me.    Addition and corrections were done as deemed appropriate. My exam and plan include: Creatinine is stable. Encourage free water intake with hypernatremia. Hypertension with chronic kidney disease stage G1 to G4. Well-controlled blood pressure at target of less than 120/80 mmHg. Continue current treatment.       Yaquelin Luna MD  Nephrology        Electronically signed by Yaquelin Luna MD on 12/18/2021 at 3:38 PM

## 2021-12-19 LAB
ALBUMIN SERPL-MCNC: 3.3 G/DL (ref 3.5–5.2)
ALP BLD-CCNC: 89 U/L (ref 35–104)
ALT SERPL-CCNC: 23 U/L (ref 0–32)
ANION GAP SERPL CALCULATED.3IONS-SCNC: 11 MMOL/L (ref 7–16)
APTT: 27 SEC (ref 24.5–35.1)
AST SERPL-CCNC: 24 U/L (ref 0–31)
BASOPHILS ABSOLUTE: 0.03 E9/L (ref 0–0.2)
BASOPHILS RELATIVE PERCENT: 0.4 % (ref 0–2)
BILIRUB SERPL-MCNC: 0.3 MG/DL (ref 0–1.2)
BLOOD CULTURE, ROUTINE: NORMAL
BUN BLDV-MCNC: 22 MG/DL (ref 6–23)
CALCIUM SERPL-MCNC: 9 MG/DL (ref 8.6–10.2)
CHLORIDE BLD-SCNC: 103 MMOL/L (ref 98–107)
CO2: 22 MMOL/L (ref 22–29)
CREAT SERPL-MCNC: 0.9 MG/DL (ref 0.5–1)
CULTURE, BLOOD 2: NORMAL
EOSINOPHILS ABSOLUTE: 0.25 E9/L (ref 0.05–0.5)
EOSINOPHILS RELATIVE PERCENT: 3.7 % (ref 0–6)
GFR AFRICAN AMERICAN: >60
GFR NON-AFRICAN AMERICAN: >60 ML/MIN/1.73
GLUCOSE BLD-MCNC: 143 MG/DL (ref 74–99)
HCT VFR BLD CALC: 36.7 % (ref 34–48)
HEMOGLOBIN: 11.9 G/DL (ref 11.5–15.5)
IMMATURE GRANULOCYTES #: 0.03 E9/L
IMMATURE GRANULOCYTES %: 0.4 % (ref 0–5)
LYMPHOCYTES ABSOLUTE: 2.49 E9/L (ref 1.5–4)
LYMPHOCYTES RELATIVE PERCENT: 37.1 % (ref 20–42)
MAGNESIUM: 1.8 MG/DL (ref 1.6–2.6)
MCH RBC QN AUTO: 30.7 PG (ref 26–35)
MCHC RBC AUTO-ENTMCNC: 32.4 % (ref 32–34.5)
MCV RBC AUTO: 94.6 FL (ref 80–99.9)
METER GLUCOSE: 103 MG/DL (ref 74–99)
METER GLUCOSE: 129 MG/DL (ref 74–99)
METER GLUCOSE: 163 MG/DL (ref 74–99)
METER GLUCOSE: 238 MG/DL (ref 74–99)
MONOCYTES ABSOLUTE: 0.54 E9/L (ref 0.1–0.95)
MONOCYTES RELATIVE PERCENT: 8 % (ref 2–12)
NEUTROPHILS ABSOLUTE: 3.37 E9/L (ref 1.8–7.3)
NEUTROPHILS RELATIVE PERCENT: 50.4 % (ref 43–80)
PDW BLD-RTO: 14.6 FL (ref 11.5–15)
PHOSPHORUS: 3.4 MG/DL (ref 2.5–4.5)
PLATELET # BLD: 249 E9/L (ref 130–450)
PMV BLD AUTO: 9.8 FL (ref 7–12)
POTASSIUM SERPL-SCNC: 3.8 MMOL/L (ref 3.5–5)
RBC # BLD: 3.88 E12/L (ref 3.5–5.5)
SODIUM BLD-SCNC: 136 MMOL/L (ref 132–146)
TOTAL PROTEIN: 6 G/DL (ref 6.4–8.3)
WBC # BLD: 6.7 E9/L (ref 4.5–11.5)

## 2021-12-19 PROCEDURE — 6370000000 HC RX 637 (ALT 250 FOR IP): Performed by: INTERNAL MEDICINE

## 2021-12-19 PROCEDURE — 6360000002 HC RX W HCPCS: Performed by: INTERNAL MEDICINE

## 2021-12-19 PROCEDURE — 80053 COMPREHEN METABOLIC PANEL: CPT

## 2021-12-19 PROCEDURE — 84100 ASSAY OF PHOSPHORUS: CPT

## 2021-12-19 PROCEDURE — 82962 GLUCOSE BLOOD TEST: CPT

## 2021-12-19 PROCEDURE — 2060000000 HC ICU INTERMEDIATE R&B

## 2021-12-19 PROCEDURE — 83735 ASSAY OF MAGNESIUM: CPT

## 2021-12-19 PROCEDURE — 36415 COLL VENOUS BLD VENIPUNCTURE: CPT

## 2021-12-19 PROCEDURE — 85730 THROMBOPLASTIN TIME PARTIAL: CPT

## 2021-12-19 PROCEDURE — 2580000003 HC RX 258: Performed by: INTERNAL MEDICINE

## 2021-12-19 PROCEDURE — 51702 INSERT TEMP BLADDER CATH: CPT

## 2021-12-19 PROCEDURE — 85025 COMPLETE CBC W/AUTO DIFF WBC: CPT

## 2021-12-19 RX ADMIN — TRAMADOL HYDROCHLORIDE 50 MG: 50 TABLET, COATED ORAL at 05:53

## 2021-12-19 RX ADMIN — METOPROLOL SUCCINATE 25 MG: 25 TABLET, EXTENDED RELEASE ORAL at 09:37

## 2021-12-19 RX ADMIN — HYDROCODONE BITARTRATE AND ACETAMINOPHEN 1 TABLET: 5; 325 TABLET ORAL at 22:54

## 2021-12-19 RX ADMIN — PANCRELIPASE 36000 UNITS: 60000; 12000; 38000 CAPSULE, DELAYED RELEASE PELLETS ORAL at 11:15

## 2021-12-19 RX ADMIN — HYDROCODONE BITARTRATE AND ACETAMINOPHEN 1 TABLET: 5; 325 TABLET ORAL at 16:44

## 2021-12-19 RX ADMIN — INSULIN HUMAN 10 UNITS: 100 INJECTION, SUSPENSION SUBCUTANEOUS at 16:45

## 2021-12-19 RX ADMIN — LEVOTHYROXINE SODIUM 75 MCG: 75 TABLET ORAL at 05:50

## 2021-12-19 RX ADMIN — LISINOPRIL 5 MG: 5 TABLET ORAL at 09:38

## 2021-12-19 RX ADMIN — TRAMADOL HYDROCHLORIDE 50 MG: 50 TABLET, COATED ORAL at 21:07

## 2021-12-19 RX ADMIN — INSULIN LISPRO 1 UNITS: 100 INJECTION, SOLUTION INTRAVENOUS; SUBCUTANEOUS at 11:16

## 2021-12-19 RX ADMIN — ATORVASTATIN CALCIUM 40 MG: 40 TABLET, FILM COATED ORAL at 21:07

## 2021-12-19 RX ADMIN — PANTOPRAZOLE SODIUM 40 MG: 40 TABLET, DELAYED RELEASE ORAL at 09:37

## 2021-12-19 RX ADMIN — INSULIN LISPRO 2 UNITS: 100 INJECTION, SOLUTION INTRAVENOUS; SUBCUTANEOUS at 16:45

## 2021-12-19 RX ADMIN — PANCRELIPASE 36000 UNITS: 60000; 12000; 38000 CAPSULE, DELAYED RELEASE PELLETS ORAL at 16:42

## 2021-12-19 RX ADMIN — ASPIRIN 81 MG: 81 TABLET, COATED ORAL at 09:38

## 2021-12-19 RX ADMIN — DULOXETINE HYDROCHLORIDE 60 MG: 60 CAPSULE, DELAYED RELEASE ORAL at 09:38

## 2021-12-19 RX ADMIN — Medication 1000 MG: at 09:37

## 2021-12-19 RX ADMIN — HYDROCODONE BITARTRATE AND ACETAMINOPHEN 1 TABLET: 5; 325 TABLET ORAL at 09:38

## 2021-12-19 RX ADMIN — INSULIN HUMAN 10 UNITS: 100 INJECTION, SUSPENSION SUBCUTANEOUS at 09:38

## 2021-12-19 RX ADMIN — HYDROCODONE BITARTRATE AND ACETAMINOPHEN 1 TABLET: 5; 325 TABLET ORAL at 00:59

## 2021-12-19 ASSESSMENT — PAIN DESCRIPTION - LOCATION
LOCATION: BACK
LOCATION: BACK;CHEST
LOCATION: BACK
LOCATION: BACK

## 2021-12-19 ASSESSMENT — PAIN SCALES - GENERAL
PAINLEVEL_OUTOF10: 9
PAINLEVEL_OUTOF10: 4
PAINLEVEL_OUTOF10: 9
PAINLEVEL_OUTOF10: 9
PAINLEVEL_OUTOF10: 8
PAINLEVEL_OUTOF10: 9

## 2021-12-19 ASSESSMENT — PAIN DESCRIPTION - DESCRIPTORS
DESCRIPTORS: ACHING;DISCOMFORT;CONSTANT
DESCRIPTORS: ACHING;DISCOMFORT
DESCRIPTORS: ACHING;DISCOMFORT

## 2021-12-19 ASSESSMENT — PAIN DESCRIPTION - FREQUENCY
FREQUENCY: CONTINUOUS
FREQUENCY: INTERMITTENT
FREQUENCY: INTERMITTENT

## 2021-12-19 ASSESSMENT — PAIN DESCRIPTION - PAIN TYPE
TYPE: CHRONIC PAIN

## 2021-12-19 ASSESSMENT — PAIN DESCRIPTION - ORIENTATION
ORIENTATION: LOWER
ORIENTATION: RIGHT;LEFT
ORIENTATION: LOWER

## 2021-12-19 ASSESSMENT — PAIN - FUNCTIONAL ASSESSMENT
PAIN_FUNCTIONAL_ASSESSMENT: PREVENTS OR INTERFERES SOME ACTIVE ACTIVITIES AND ADLS
PAIN_FUNCTIONAL_ASSESSMENT: ACTIVITIES ARE NOT PREVENTED
PAIN_FUNCTIONAL_ASSESSMENT: PREVENTS OR INTERFERES SOME ACTIVE ACTIVITIES AND ADLS

## 2021-12-19 ASSESSMENT — PAIN DESCRIPTION - PROGRESSION
CLINICAL_PROGRESSION: GRADUALLY WORSENING

## 2021-12-19 ASSESSMENT — PAIN DESCRIPTION - ONSET
ONSET: ON-GOING
ONSET: GRADUAL

## 2021-12-19 NOTE — PROGRESS NOTES
Hospital Medicine    Subjective:  Pt alert conversive vandana diet      Current Facility-Administered Medications:     metoprolol succinate (TOPROL XL) extended release tablet 25 mg, 25 mg, Oral, Daily, Ramin Stanford MD, 25 mg at 12/19/21 0937    lisinopril (PRINIVIL;ZESTRIL) tablet 5 mg, 5 mg, Oral, Daily, Ramin Stanford MD, 5 mg at 12/19/21 0938    atorvastatin (LIPITOR) tablet 40 mg, 40 mg, Oral, Nightly, Ramin Stanford MD, 40 mg at 12/18/21 2045    traMADol (ULTRAM) tablet 50 mg, 50 mg, Oral, Q6H PRN, Rick Mcintosh MD, 50 mg at 12/19/21 0553    lipase-protease-amylase (CREON) delayed release capsule 36,000 Units, 36,000 Units, Oral, TID WC, Rcik Mcintosh MD, 36,000 Units at 12/19/21 1115    glucose (GLUTOSE) 40 % oral gel 15 g, 15 g, Oral, PRN, Rick Mcintosh MD    dextrose 50 % IV solution, 12.5 g, IntraVENous, PRN, Rick Mcintosh MD    glucagon (rDNA) injection 1 mg, 1 mg, IntraMUSCular, PRN, Rick Mcintosh MD    dextrose 5 % solution, 100 mL/hr, IntraVENous, PRN, Rick Mcintosh MD    cefTRIAXone (ROCEPHIN) 1,000 mg in sodium chloride flush 10 mL IV syringe, 1,000 mg, IntraVENous, Q24H, Dhaval Malhotra MD, 1,000 mg at 12/19/21 0937    HYDROcodone-acetaminophen (NORCO) 5-325 MG per tablet 1 tablet, 1 tablet, Oral, TID PRN, Dhaval Malhotra MD, 1 tablet at 12/19/21 1434    DULoxetine (CYMBALTA) extended release capsule 60 mg, 60 mg, Oral, Daily, Dhaval Malhotra MD, 60 mg at 12/19/21 3664    levothyroxine (SYNTHROID) tablet 75 mcg, 75 mcg, Oral, Daily, Dhaval Malhotra MD, 75 mcg at 12/19/21 0550    pantoprazole (PROTONIX) tablet 40 mg, 40 mg, Oral, QAM AC, Dhaval Malhotra MD, 40 mg at 12/19/21 4113    hydrOXYzine (VISTARIL) capsule 25 mg, 25 mg, Oral, TID PRN, Dhaval Malhotra MD, 25 mg at 12/17/21 1611    tiZANidine (ZANAFLEX) tablet 4 mg, 4 mg, Oral, Q12H PRN, Dhaval Malhotra MD, 4 mg at 12/14/21 1541    aspirin EC tablet 81 mg, 81 mg, Oral, Daily, Umer Duarte MD, 81 mg at 12/19/21 0938    insulin NPH (HUMULIN N;NOVOLIN N) injection vial 10 Units, 10 Units, SubCUTAneous, BID AC, Umer Duarte MD, 10 Units at 12/19/21 0938    insulin lispro (HUMALOG) injection vial 0-6 Units, 0-6 Units, SubCUTAneous, TID WC, Umer Duarte MD, 1 Units at 12/19/21 1116    insulin lispro (HUMALOG) injection vial 0-3 Units, 0-3 Units, SubCUTAneous, Nightly, Umer Duarte MD, 1 Units at 12/17/21 2017    dextrose 50 % IV solution, 12.5 g, IntraVENous, PRN, Umer Duarte MD    Objective:    /60   Pulse 71   Temp 98.7 °F (37.1 °C) (Oral)   Resp 17   Ht 5' 1\" (1.549 m)   Wt 190 lb 6.4 oz (86.4 kg)   SpO2 97%   BMI 35.98 kg/m²     Heart:  reg  Lungs:  ctab  Abd: + bs soft nontender  Extrem:  W/o edema    CBC with Differential:    Lab Results   Component Value Date    WBC 6.7 12/19/2021    RBC 3.88 12/19/2021    HGB 11.9 12/19/2021    HCT 36.7 12/19/2021     12/19/2021    MCV 94.6 12/19/2021    MCH 30.7 12/19/2021    MCHC 32.4 12/19/2021    RDW 14.6 12/19/2021    LYMPHOPCT 37.1 12/19/2021    MONOPCT 8.0 12/19/2021    BASOPCT 0.4 12/19/2021    MONOSABS 0.54 12/19/2021    LYMPHSABS 2.49 12/19/2021    EOSABS 0.25 12/19/2021    BASOSABS 0.03 12/19/2021     CMP:    Lab Results   Component Value Date     12/19/2021    K 3.8 12/19/2021    K 4.3 12/13/2021     12/19/2021    CO2 22 12/19/2021    BUN 22 12/19/2021    CREATININE 0.9 12/19/2021    GFRAA >60 12/19/2021    LABGLOM >60 12/19/2021    GLUCOSE 143 12/19/2021    PROT 6.0 12/19/2021    LABALBU 3.3 12/19/2021    CALCIUM 9.0 12/19/2021    BILITOT 0.3 12/19/2021    ALKPHOS 89 12/19/2021    AST 24 12/19/2021    ALT 23 12/19/2021     Warfarin PT/INR:    Lab Results   Component Value Date    INR 1.0 06/14/2021    INR 1.2 07/09/2019    INR 1.0 08/19/2015    PROTIME 11.1 06/14/2021    PROTIME 14.1 (H) 07/09/2019    PROTIME 11.2 08/19/2015       Assessment:    Active Problems:    NSTEMI (non-ST elevated myocardial infarction) ([facility] [address]  Resolved Problems:    * No resolved hospital problems.  *      Plan:  Increase activity dc planning        Carly Norwood DO  11:47 AM  12/19/2021

## 2021-12-19 NOTE — PROGRESS NOTES
Bladder scan 0mL. Ford draining. Leg strap applied and tubing secured to leg. Will continue to monitor.

## 2021-12-19 NOTE — PROGRESS NOTES
NEPHROLOGY Attending   Progress Note  12/19/2021 8:56 AM  Subjective:   Admit Date: 12/13/2021  PCP: Aleks Barillas MD    Interval History:     12/19/21: bladder spasms last radha - danielson irrigated - stable BP       Diet: ADULT DIET; Regular; 4 carb choices (60 gm/meal); Low Sodium (2 gm)    Data:   Scheduled Meds:   metoprolol succinate  25 mg Oral Daily    lisinopril  5 mg Oral Daily    atorvastatin  40 mg Oral Nightly    lipase-protease-amylase  36,000 Units Oral TID WC    cefTRIAXone (ROCEPHIN) IV  1,000 mg IntraVENous Q24H    DULoxetine  60 mg Oral Daily    levothyroxine  75 mcg Oral Daily    pantoprazole  40 mg Oral QAM AC    aspirin  81 mg Oral Daily    insulin NPH  10 Units SubCUTAneous BID AC    insulin lispro  0-6 Units SubCUTAneous TID WC    insulin lispro  0-3 Units SubCUTAneous Nightly     Continuous Infusions:   dextrose       PRN Meds:traMADol, glucose, dextrose, glucagon (rDNA), dextrose, HYDROcodone 5 mg - acetaminophen, hydrOXYzine, tiZANidine, dextrose    Intake/Output Summary (Last 24 hours) at 12/19/2021 0856  Last data filed at 12/18/2021 1912  Gross per 24 hour   Intake    Output 350 ml   Net -350 ml     CBC:   Recent Labs     12/17/21  0420 12/18/21  0445 12/19/21  0440   WBC 6.3 6.2 6.7   HGB 13.0 12.4 11.9    249 249     BMP:    Recent Labs     12/17/21  0420 12/18/21  0445 12/19/21  0440    146 136   K 4.3 3.7 3.8    108* 103   CO2 23 22 22   BUN 18 20 22   CREATININE 0.9 1.0 0.9   GLUCOSE 205* 168* 143*     Hepatic:   Recent Labs     12/17/21  0420 12/18/21  0445 12/19/21  0440   AST 23 19 24   ALT 37* 25 23   BILITOT 0.5 0.4 0.3   ALKPHOS 106* 93 89     Troponin: No results for input(s): TROPONINI in the last 72 hours. BNP: No results for input(s): BNP in the last 72 hours. Lipids: No results for input(s): CHOL, HDL in the last 72 hours.     Invalid input(s): LDLCALCU  ABGs: No results found for: PHART, PO2ART, VLC2AKV  INR: No results for input(s): INR in the last 72 hours. -----------------------------------------------------------------  RAD: No results found. Objective:   Vitals:   Vitals:    12/19/21 0000   BP: (!) 128/59   Pulse: 76   Resp: 16   Temp: 98.8 °F (37.1 °C)   SpO2: 96%     Patient Vitals for the past 24 hrs:   BP Temp Temp src Pulse Resp SpO2   12/19/21 0000 (!) 128/59 98.8 °F (37.1 °C) Oral 76 16 96 %   12/18/21 2045 112/61 98.4 °F (36.9 °C) Oral 74 18    12/18/21 1708 117/60 98.4 °F (36.9 °C) Oral 78 16      Appearance: Awake, alert, no acute respiratory distress  Skin: Intact, no rash  Head: Normocephalic, atraumatic  Eyes: EOMI, no conjunctival erythema  ENMT: No pharyngeal erythema, MMM, no rhinorrhea  Neck: Supple, no elevated JVP, no carotid bruits  Lungs: Clear to auscultation bilaterally. No wheezes, rales, or rhonchi. Cardiac: Regular rate and rhythm, +S1S2, no murmurs apparent  Abdomen: Soft, nontender, +bowel sounds  Extremities: Moves all extremities x 4, no lower extremity edema  Neurologic: No focal motor deficits apparent, normal mood and affect  Peripheral Pulses: Intact posterior tibial pulses bilaterally      Assessment/Plan:   1) Acute kidney injury on admission   Resolved   Follow labs and I/Os     2) Ischemic Cardiomyopathy  status post diagnostic heart cath   Ischemic cardiomyopathy EF 40 to 45%  Follow on Toprol-XL 25 mg p.o. twice daily / lisinopril 5 mg p.o. daily   Use Lasix as needed. Cardiology following    3) DM Type 2   Per the primary     4) Hypokalemia  Supplement PRNfor goal >4.0  Follow Mg closely as well      5) Hypertension with chronic kidney disease stage G1 to G4. Well-controlled blood pressure at target of less than 120/80 mmHg. YVONNE Henao - CNP     Patient seen and examined. Chart reviewed. I had a face to face encounter with the patient. Agree with exam.    Agree with  formulation, assessment and plan as outlined above and directed by me.    Addition and corrections were done as deemed appropriate. My exam and plan include:     Patient's acute kidney injury is resolved. Patient rather stable from a renal standpoint. Will sign off. Please call back if needed.            Sierra Dallas MD  Nephrology        Electronically signed by Sierra Dallas MD on 12/19/2021 at 4:48 PM

## 2021-12-19 NOTE — PROGRESS NOTES
Dr. Gabriella Colon on call, patient complaining of bladder spasms. Catheter irrigated with sterile water easily, with clear yellow urine return. Patient continuing to complaining of pain and had 350mL.  See new orders

## 2021-12-20 VITALS
DIASTOLIC BLOOD PRESSURE: 55 MMHG | RESPIRATION RATE: 16 BRPM | TEMPERATURE: 97.7 F | HEIGHT: 61 IN | HEART RATE: 69 BPM | OXYGEN SATURATION: 91 % | WEIGHT: 188.1 LBS | SYSTOLIC BLOOD PRESSURE: 112 MMHG | BODY MASS INDEX: 35.51 KG/M2

## 2021-12-20 LAB
ALBUMIN SERPL-MCNC: 3.2 G/DL (ref 3.5–5.2)
ALP BLD-CCNC: 90 U/L (ref 35–104)
ALT SERPL-CCNC: 21 U/L (ref 0–32)
ANION GAP SERPL CALCULATED.3IONS-SCNC: 12 MMOL/L (ref 7–16)
APTT: 26.9 SEC (ref 24.5–35.1)
AST SERPL-CCNC: 21 U/L (ref 0–31)
BASOPHILS ABSOLUTE: 0.04 E9/L (ref 0–0.2)
BASOPHILS RELATIVE PERCENT: 0.6 % (ref 0–2)
BILIRUB SERPL-MCNC: 0.2 MG/DL (ref 0–1.2)
BUN BLDV-MCNC: 26 MG/DL (ref 6–23)
CALCIUM SERPL-MCNC: 8.8 MG/DL (ref 8.6–10.2)
CHLORIDE BLD-SCNC: 104 MMOL/L (ref 98–107)
CO2: 23 MMOL/L (ref 22–29)
CREAT SERPL-MCNC: 1 MG/DL (ref 0.5–1)
EOSINOPHILS ABSOLUTE: 0.28 E9/L (ref 0.05–0.5)
EOSINOPHILS RELATIVE PERCENT: 4.2 % (ref 0–6)
GFR AFRICAN AMERICAN: >60
GFR NON-AFRICAN AMERICAN: 54 ML/MIN/1.73
GLUCOSE BLD-MCNC: 246 MG/DL (ref 74–99)
HCT VFR BLD CALC: 34.8 % (ref 34–48)
HEMOGLOBIN: 11.2 G/DL (ref 11.5–15.5)
IMMATURE GRANULOCYTES #: 0.04 E9/L
IMMATURE GRANULOCYTES %: 0.6 % (ref 0–5)
LYMPHOCYTES ABSOLUTE: 2.52 E9/L (ref 1.5–4)
LYMPHOCYTES RELATIVE PERCENT: 37.5 % (ref 20–42)
MAGNESIUM: 1.6 MG/DL (ref 1.6–2.6)
MCH RBC QN AUTO: 30.9 PG (ref 26–35)
MCHC RBC AUTO-ENTMCNC: 32.2 % (ref 32–34.5)
MCV RBC AUTO: 96.1 FL (ref 80–99.9)
METER GLUCOSE: 163 MG/DL (ref 74–99)
METER GLUCOSE: 179 MG/DL (ref 74–99)
MONOCYTES ABSOLUTE: 0.51 E9/L (ref 0.1–0.95)
MONOCYTES RELATIVE PERCENT: 7.6 % (ref 2–12)
NEUTROPHILS ABSOLUTE: 3.33 E9/L (ref 1.8–7.3)
NEUTROPHILS RELATIVE PERCENT: 49.5 % (ref 43–80)
PDW BLD-RTO: 14.8 FL (ref 11.5–15)
PHOSPHORUS: 4.1 MG/DL (ref 2.5–4.5)
PLATELET # BLD: 264 E9/L (ref 130–450)
PMV BLD AUTO: 10.2 FL (ref 7–12)
POTASSIUM SERPL-SCNC: 4.1 MMOL/L (ref 3.5–5)
RBC # BLD: 3.62 E12/L (ref 3.5–5.5)
SARS-COV-2, NAAT: NOT DETECTED
SODIUM BLD-SCNC: 139 MMOL/L (ref 132–146)
TOTAL PROTEIN: 5.6 G/DL (ref 6.4–8.3)
WBC # BLD: 6.7 E9/L (ref 4.5–11.5)

## 2021-12-20 PROCEDURE — 83735 ASSAY OF MAGNESIUM: CPT

## 2021-12-20 PROCEDURE — 97530 THERAPEUTIC ACTIVITIES: CPT

## 2021-12-20 PROCEDURE — 80053 COMPREHEN METABOLIC PANEL: CPT

## 2021-12-20 PROCEDURE — 6370000000 HC RX 637 (ALT 250 FOR IP): Performed by: INTERNAL MEDICINE

## 2021-12-20 PROCEDURE — 85025 COMPLETE CBC W/AUTO DIFF WBC: CPT

## 2021-12-20 PROCEDURE — 84100 ASSAY OF PHOSPHORUS: CPT

## 2021-12-20 PROCEDURE — 36415 COLL VENOUS BLD VENIPUNCTURE: CPT

## 2021-12-20 PROCEDURE — 6360000002 HC RX W HCPCS: Performed by: INTERNAL MEDICINE

## 2021-12-20 PROCEDURE — 87635 SARS-COV-2 COVID-19 AMP PRB: CPT

## 2021-12-20 PROCEDURE — 85730 THROMBOPLASTIN TIME PARTIAL: CPT

## 2021-12-20 PROCEDURE — 2580000003 HC RX 258: Performed by: INTERNAL MEDICINE

## 2021-12-20 PROCEDURE — 97535 SELF CARE MNGMENT TRAINING: CPT

## 2021-12-20 PROCEDURE — 82962 GLUCOSE BLOOD TEST: CPT

## 2021-12-20 RX ORDER — DULOXETIN HYDROCHLORIDE 60 MG/1
60 CAPSULE, DELAYED RELEASE ORAL DAILY
Qty: 30 CAPSULE | Refills: 3
Start: 2021-12-21

## 2021-12-20 RX ORDER — LISINOPRIL 5 MG/1
5 TABLET ORAL DAILY
Qty: 30 TABLET | Refills: 3
Start: 2021-12-21

## 2021-12-20 RX ADMIN — HYDROXYZINE PAMOATE 25 MG: 25 CAPSULE ORAL at 03:14

## 2021-12-20 RX ADMIN — METOPROLOL SUCCINATE 25 MG: 25 TABLET, EXTENDED RELEASE ORAL at 08:07

## 2021-12-20 RX ADMIN — INSULIN HUMAN 10 UNITS: 100 INJECTION, SUSPENSION SUBCUTANEOUS at 08:09

## 2021-12-20 RX ADMIN — INSULIN LISPRO 1 UNITS: 100 INJECTION, SOLUTION INTRAVENOUS; SUBCUTANEOUS at 08:09

## 2021-12-20 RX ADMIN — INSULIN LISPRO 1 UNITS: 100 INJECTION, SOLUTION INTRAVENOUS; SUBCUTANEOUS at 12:16

## 2021-12-20 RX ADMIN — LISINOPRIL 5 MG: 5 TABLET ORAL at 08:07

## 2021-12-20 RX ADMIN — Medication 1000 MG: at 08:08

## 2021-12-20 RX ADMIN — HYDROCODONE BITARTRATE AND ACETAMINOPHEN 1 TABLET: 5; 325 TABLET ORAL at 13:56

## 2021-12-20 RX ADMIN — ASPIRIN 81 MG: 81 TABLET, COATED ORAL at 08:07

## 2021-12-20 RX ADMIN — PANTOPRAZOLE SODIUM 40 MG: 40 TABLET, DELAYED RELEASE ORAL at 06:07

## 2021-12-20 RX ADMIN — DULOXETINE HYDROCHLORIDE 60 MG: 60 CAPSULE, DELAYED RELEASE ORAL at 08:13

## 2021-12-20 RX ADMIN — LEVOTHYROXINE SODIUM 75 MCG: 75 TABLET ORAL at 06:07

## 2021-12-20 RX ADMIN — HYDROCODONE BITARTRATE AND ACETAMINOPHEN 1 TABLET: 5; 325 TABLET ORAL at 06:08

## 2021-12-20 RX ADMIN — PANCRELIPASE 36000 UNITS: 60000; 12000; 38000 CAPSULE, DELAYED RELEASE PELLETS ORAL at 12:42

## 2021-12-20 RX ADMIN — PANCRELIPASE 36000 UNITS: 60000; 12000; 38000 CAPSULE, DELAYED RELEASE PELLETS ORAL at 08:06

## 2021-12-20 ASSESSMENT — PAIN DESCRIPTION - ONSET: ONSET: ON-GOING

## 2021-12-20 ASSESSMENT — PAIN DESCRIPTION - LOCATION: LOCATION: BACK;CHEST

## 2021-12-20 ASSESSMENT — PAIN DESCRIPTION - ORIENTATION: ORIENTATION: RIGHT;LEFT

## 2021-12-20 ASSESSMENT — PAIN SCALES - GENERAL
PAINLEVEL_OUTOF10: 9
PAINLEVEL_OUTOF10: 9
PAINLEVEL_OUTOF10: 0
PAINLEVEL_OUTOF10: 0
PAINLEVEL_OUTOF10: 9

## 2021-12-20 ASSESSMENT — PAIN DESCRIPTION - PAIN TYPE: TYPE: ACUTE PAIN

## 2021-12-20 ASSESSMENT — PAIN DESCRIPTION - DESCRIPTORS: DESCRIPTORS: ACHING

## 2021-12-20 ASSESSMENT — PAIN DESCRIPTION - FREQUENCY: FREQUENCY: CONTINUOUS

## 2021-12-20 NOTE — PROGRESS NOTES
Physical Therapy    Facility/Department: [address] INTERMEDIATE  Rx     NAME: Lawyer Shea  : 1945  MRN: [id number]    Date of Service: 2021               Patient Diagnosis(es): The primary encounter diagnosis was NSTEMI (non-ST elevated [facility] infarction) ([facility] Diagnoses of Diabetic ketoacidosis without coma associated with type 2 diabetes mellitus ([facility] [address] New onset right bundle branch block (RBBB), Traumatic rhabdomyolysis, initial encounter ([facility] Chronic kidney disease, unspecified CKD stage, Lactic acidosis, Acute respiratory failure with hypoxia ([facility] and Acute on chronic combined systolic and diastolic CHF (congestive heart failure) ([facility] were also pertinent to this visit. has a past medical history of Arthritis, Depression, Diabetes mellitus ([facility] and Hypertension. has a past surgical history that includes Cholecystectomy; Appendectomy; Leg Surgery; Toe Surgery; Foot surgery; Clavicle surgery; and Dental surgery. Evaluating Therapist: Edwardo Hodge PT     Referring Provider:  Tootie Wheatley MD    PT order : Pt eval and treat     Room #:  636  DIAGNOSIS: lactic acidosis   Falls, weakness   PRECAUTIONS:  Falls     Social:  Pt lives with  Spouse  in a  1  floor plan  4  steps and  1  rails to enter. Prior to admission pt walked with  No AD, independent. Per pt      Initial Evaluation  Date:  12/15/2021  Treatment        Short Term/ Long Term   Goals   Was pt agreeable to Eval/treatment? yes  yes    Does pt have pain?  chest pain, see comments      Bed Mobility  Rolling:  NT   Supine to sit: mod assist   Sit to supine:   Mod assist   Scooting:  Max assist in supine    Supine to sit sba with cues    Rolling cues for technique     Scooting to eob  sba  SBA    Transfers Sit to stand:  Min assist   Stand to sit: min assist   Stand pivot: min assist   sit to stand cga  Stand to sit cga  Stand pivot ww  sba   SBA    Ambulation     10  feet with ww  with  Min assist   60 feet ww sba assistive devices or modalities to obtain goals. Patient and family education will also be administered as needed. PLAN OF CARE:    Current Treatment Recommendations     [x] Strengthening to improve independence with functional mobility   [] ROM to improve independence with functional mobility   [x] Balance Training to improve static/dynamic balance and to reduce fall risk  [x] Endurance Training to improve activity tolerance during functional mobility   [x] Transfer Training to improve safety and independence with all functional transfers   [x] Gait Training to improve gait mechanics, endurance and assess need for appropriate assistive device  [] Stair Training in preparation for safe discharge home and/or into the community   [x] Positioning to prevent skin breakdown and contractures  [x] Safety and Education Training   [x] Patient/Caregiver Education   [] HEP  [] Other     Frequency of treatments will be 2-5x/week x  7 -10 days.     Time in: 1101  Time out: 1115      CPT codes:  [x] Low Complexity PT evaluation 18430  [] Moderate Complexity PT evaluation 54492  [] High Complexity PT evaluation 21612  [] PT Re-evaluation 42657  [] Gait training 13617  minutes  [x] Therapeutic activities 50955  14   minutes  [] Therapeutic exercises 29922  minutes  [] Neuromuscular reeducation 58026  minutes       Ericka Pedroza, [address]

## 2021-12-20 NOTE — PROGRESS NOTES
Occupational Therapy  OT BEDSIDE TREATMENT NOTE      Date:2021  Patient Name: Oh Cummings  MRN: [id number]  : 1945  Room: [address] [place] [address]         Evaluating OT: Matt Zhang OTR/L   [id number]       Referring Guillermina Alejo MD    Specific Provider Orders/Date:OT eval and treat        Diagnosis:  Lactic acidosis [E87.2]  NSTEMI (non-ST elevated myocardial infarction) ([facility] [address] [I21.4]  Acute respiratory failure with hypoxia (HCC) [J96.01]  Acute on chronic combined systolic and diastolic CHF (congestive heart failure) ([facility] [address] [I50.43]  Tra[facility]habdom[facility]olysis, [facility] Ravin Ovalle. 6XXA]  Diabetic ketoacidosis without coma associated with type 2 diabetes mellitus ([facility] [address] [E11.10]  New onset right bundle branch block (RBBB) [I45.10]  Chronic kidney disease, unspecified CKD stage [N18.9]     Pertinent Medical History: DM, HTN    Precautions:  Fall Risk      Assessment of current deficits    [x]? Functional mobility            [x]?ADLs           [x]? Strength                  []?Cognition    [x]? Functional transfers          [x]? IADLs         [x]? Safety Awareness   [x]? Endurance    []? Fine Coordination                         [x]? Balance      []? Vision/perception   [x]? Sensation      []? Gross Motor Coordination             []? ROM           []?  Delirium                   []? Motor Control      OT PLAN OF CARE   OT POC based on physician orders, patient diagnosis and results of clinical assessment     Frequency/Duration  2-4 days/wk for 2 weeks PRN   Specific OT Treatment Interventions to include:   ADL retraining/adapted techniques and AE recommendations to increase functional independence within precautions                    Energy conservation techniques to improve tolerance for selfcare routine   Functional transfer/mobility training/DME recommendations for increased independence, safety and fall prevention         Patient/family education to increase safety and functional independence             Environmental modifications for safe mobility and completion of ADLs                             Therapeutic activity to improve functional performance during ADLs.                                         Therapeutic exercise to improve tolerance and functional strength for ADLs    Balance retraining/tolerance tasks for facilitation of postural control with dynamic challenges during ADLs .       Positioning to improve functional independence  []?   Recommended Adaptive Equipment: continue to assess      Home Living: Pt lives with , 1 story with 4 steps/rail      Prior Level of Function: Independent  with ADLs , Independent  with IADLs; ambulated with no device   Driving: yes      Pain Level: Pt did not complain of pain. Reports fatigue from sitting in the chair. Cognition: Awake and alert. Cues for safety. Functional Assessment:  AM-PAC Daily Activity Raw Score: 15/24    Initial Eval Status  Date: 12/15/21 Treatment Status  Date: 12/20/21  STGs = LTGs  Time frame: 10-14 days   Feeding set-up  Setup   Independent    Grooming SBA/set-up   min  A Mod I    UB Dressing Min A   min  A Supervision    LB Dressing Mod A    Supervision    Bathing Mod A    Supervision    Toileting Assist with thorough hygiene  Min  A clothing management.     Supervision    Bed Mobility  Mod A   Supine <> sit  SBA sit to supine   supevision    Functional Transfers Min A  Sit-stand from bed  CGA from bed and chair surfaces. Cues for hand placement to increase safety technique.     Supervision    Functional Mobility Min A,w/walker  Steps to/from bed only   - patient returned to bed - d/t chest pain (nurse made aware)  O2 sats 95%  HR 70   On 2 L o2 CGA limited distance in room using w/w.   Pt reports quick fatigue during mobility.   Supervision  with good tolerance    Balance Sitting:     Static:  SBA     Dynamic:Mod a   Standing: Sary  CGA for brief periods of standing.   Supervision    Activity Tolerance Fair - with light activity   limited  Good  with ADL activity        Comments:  Pt sitting in the chair and requesting to return to bed. States she has been in the chair for over an hour. Unable to walk to opposite side of the bed due to fatigue. No LOB noted. Pt assisted back to bed. Positioned to comfort. Pt did not verbalize any other needs. Education/treatment:  ADL retraining with facilitation of movement to increase self care skills. Therapeutic activity to address balance and endurance for ADL and transfers. Pt education of daily orientation, walker safety, and transfer safety. · Pt has made  progress towards set goals.        Time In: 9:15  Time Out: 9:25     Min Units   Therapeutic Ex 23566     Therapeutic Activities 41267 5    ADL/Self Care 28494 5 1   Orthotic Management 70541     Neuro Re-Ed 50027     Non-Billable Time     TOTAL TIMED TREATMENT 10 [address] [facility]/ [id number]

## 2021-12-20 NOTE — PROGRESS NOTES
Subjective:    Chief complaint:    Seen earlier this am  Sitting up in a chair  Breathing is easier    Objective:    BP (!) 112/55   Pulse 69   Temp 97.7 °F (36.5 °C) (Oral)   Resp 16   Ht 5' 1\" (1.549 m)   Wt 188 lb 1.6 oz (85.3 kg)   SpO2 91%   BMI 35.54 kg/m²   General : Awake, pleasantly confused  Heart:  RRR, no murmurs, gallops, or rubs. Lungs:  CTA bilaterally, no wheeze, rales or rhonchi  Abd: bowel sounds present, nontender, nondistended, no masses  Extrem:  No clubbing, cyanosis, or edema    CBC:   Lab Results   Component Value Date    WBC 6.7 12/20/2021    RBC 3.62 12/20/2021    HGB 11.2 12/20/2021    HCT 34.8 12/20/2021    MCV 96.1 12/20/2021    MCH 30.9 12/20/2021    MCHC 32.2 12/20/2021    RDW 14.8 12/20/2021     12/20/2021    MPV 10.2 12/20/2021     BMP:    Lab Results   Component Value Date     12/20/2021    K 4.1 12/20/2021    K 4.3 12/13/2021     12/20/2021    CO2 23 12/20/2021    BUN 26 12/20/2021    LABALBU 3.2 12/20/2021    CREATININE 1.0 12/20/2021    CALCIUM 8.8 12/20/2021    GFRAA >60 12/20/2021    LABGLOM 54 12/20/2021    GLUCOSE 246 12/20/2021     PT/INR:    Lab Results   Component Value Date    PROTIME 11.1 06/14/2021    INR 1.0 06/14/2021     Troponin:    Lab Results   Component Value Date    TROPONINI <0.01 07/09/2019       No results for input(s): LABURIN in the last 72 hours. No results for input(s): BC in the last 72 hours. No results for input(s): [place] in the last 72 hours.       Current Facility-Administered Medications:     metoprolol succinate (TOPROL XL) extended release tablet 25 mg, 25 mg, Oral, Daily, Elmer Wilcox MD, 25 mg at 12/20/21 0807    lisinopril (PRINIVIL;ZESTRIL) tablet 5 mg, 5 mg, Oral, Daily, Elmer Wilcox MD, 5 mg at 12/20/21 0807    atorvastatin (LIPITOR) tablet 40 mg, 40 mg, Oral, Nightly, Elmer Wilcox MD, 40 mg at 12/19/21 2107    traMADol (ULTRAM) tablet 50 mg, 50 mg, Oral, Q6H PRN, Donald Kaba MD, 50 mg at 12/19/21 2107    lipase-protease-amylase (CREON) delayed release capsule 36,000 Units, 36,000 Units, Oral, TID WC, Billy Sullivan MD, 36,000 Units at 12/20/21 0806    glucose (GLUTOSE) 40 % oral gel 15 g, 15 g, Oral, PRN, Billy Sullivan MD    dextrose 50 % IV solution, 12.5 g, IntraVENous, PRN, Billy Sullivan MD    glucagon (rDNA) injection 1 mg, 1 mg, IntraMUSCular, PRN, Billy Sullivan MD    dextrose 5 % solution, 100 mL/hr, IntraVENous, PRN, Billy Sullivan MD    cefTRIAXone (ROCEPHIN) 1,000 mg in sodium chloride flush 10 mL IV syringe, 1,000 mg, IntraVENous, Q24H, Moe Aguero MD, 1,000 mg at 12/20/21 0808    HYDROcodone-acetaminophen (NORCO) 5-325 MG per tablet 1 tablet, 1 tablet, Oral, TID PRN, Moe Aguero MD, 1 tablet at 12/20/21 0608    DULoxetine (CYMBALTA) extended release capsule 60 mg, 60 mg, Oral, Daily, Moe Aguero MD, 60 mg at 12/20/21 0813    levothyroxine (SYNTHROID) tablet 75 mcg, 75 mcg, Oral, Daily, Moe Aguero MD, 75 mcg at 12/20/21 0607    pantoprazole (PROTONIX) tablet 40 mg, 40 mg, Oral, QAM AC, Moe Aguero MD, 40 mg at 12/20/21 0607    hydrOXYzine (VISTARIL) capsule 25 mg, 25 mg, Oral, TID PRN, Moe Aguero MD, 25 mg at 12/20/21 0314    tiZANidine (ZANAFLEX) tablet 4 mg, 4 mg, Oral, Q12H PRN, Moe Aguero MD, 4 mg at 12/14/21 1541    aspirin EC tablet 81 mg, 81 mg, Oral, Daily, Moe Aguero MD, 81 mg at 12/20/21 0807    insulin NPH (HUMULIN N;NOVOLIN N) injection vial 10 Units, 10 Units, SubCUTAneous, BID ACMoe MD, 10 Units at 12/20/21 0809    insulin lispro (HUMALOG) injection vial 0-6 Units, 0-6 Units, SubCUTAneous, TID WC, Moe Aguero MD, 1 Units at 12/20/21 0809    insulin lispro (HUMALOG) injection vial 0-3 Units, 0-3 Units, SubCUTAneous, Nightly, Moe Aguero MD, 1 Units at 12/17/21 2017    dextrose 50 % IV solution, 12.5 g, IntraVENous, PRN, Moe Aguero MD    ADULT DIET; Regular; 4 carb choices (60 gm/meal); Low Sodium (2 gm)    CT ABDOMEN PELVIS WO CONTRAST Additional Contrast? None   Final Result   No acute process in the abdomen and pelvis. Probable cirrhotic liver with hemangioma in the left lobe. 2 cm left renal cyst.      Constipation. Fat containing ventral hernia, upper abdomen. Bibasilar atelectasis, lung bases. RECOMMENDATIONS:   Unavailable         CT HEAD WO CONTRAST   Final Result   No acute intracranial abnormality. RECOMMENDATIONS:   Unavailable         CT CERVICAL SPINE WO CONTRAST   Final Result   No acute abnormality of the cervical spine. Multilevel degenerative changes, as noted above. RECOMMENDATIONS:   Unavailable         CTA CHEST W CONTRAST   Final Result   No evidence of pulmonary embolism. Suspect cardiomegaly and pulmonary vascular congestion. RECOMMENDATIONS:   Unavailable         XR CHEST PORTABLE   Final Result   Pulmonary vascular congestion and suspect early CHF. Please correlate   clinically. XR PELVIS (1-2 VIEWS)   Final Result   No acute bony abnormality. Assessment:    Active Problems:    NSTEMI (non-ST elevated myocardial infarction) ([facility] [address]  Resolved Problems:    * No resolved hospital problems. *  LV systolic dysfunction  Reviewed creatinine improved  Hypokalemia  Chronic  Pain syndrome  Underlying history of anxiety/depression  DKA resolved  Diabetes mellitus type 2  Transaminitis -LFTs improving  metabolic encephalopathy  E.coli UTI    Plan:    Off oxygen  Renal function normal  Await pre-ert for AMANDA Oliva MD  11:00 AM  12/20/2021    NOTE: This report was transcribed using voice recognition software.  Every effort was made to ensure accuracy; however, inadvertent transcription errors may be present

## 2021-12-20 NOTE — CARE COORDINATION
Discharge plan is for pt to transition to [address] left with Nicanor Camilo inquiring about precert status- await response. Pt will need negative Covid on day of discharge. 1230: Per Nicanor Camilo at [facility] patient can admit to [facility] today. Will need negative Covid. Charge RN aware     1320: [facility] set up today at [facility] and patient aware.

## 2021-12-20 NOTE — PLAN OF CARE
Problem: Pain:  Goal: Pain level will decrease  Description: Pain level will decrease  12/19/2021 2133 by Ирина Erwin RN  Outcome: Met This Shift  12/19/2021 1036 by Angelica Dhillon RN  Outcome: Met This Shift  Goal: Control of acute pain  Description: Control of acute pain  Outcome: Met This Shift  Goal: Control of chronic pain  Description: Control of chronic pain  Outcome: Met This Shift     Problem: Skin Integrity:  Goal: Will show no infection signs and symptoms  Description: Will show no infection signs and symptoms  12/19/2021 2133 by Ирина Erwin RN  Outcome: Met This Shift  12/19/2021 1036 by Angelica Dhillon RN  Outcome: Met This Shift  Goal: Absence of new skin breakdown  Description: Absence of new skin breakdown  Outcome: Met This Shift     Problem: Cardiac:  Goal: Hemodynamic stability will improve  Description: Hemodynamic stability will improve  Outcome: Met This Shift  Goal: Ability to maintain an adequate cardiac output will improve  Description: Ability to maintain an adequate cardiac output will improve  Outcome: Met This Shift     Problem: Fluid Volume:  Goal: Risk for excess fluid volume will decrease  Description: Risk for excess fluid volume will decrease  Outcome: Met This Shift  Goal: Maintenance of adequate hydration will improve  Description: Maintenance of adequate hydration will improve  Outcome: Met This Shift  Goal: Will show no signs and symptoms of electrolyte imbalance  Description: Will show no signs and symptoms of electrolyte imbalance  Outcome: Met This Shift

## 2021-12-21 ENCOUNTER — TELEPHONE (OUTPATIENT)
Dept: ADMINISTRATIVE | Age: 76
End: 2021-12-21

## 2021-12-21 NOTE — TELEPHONE ENCOUNTER
Per Dr. Mimi Christensen note from 12/17/21, patient needs (1) month F[facility] of Dr. Mimi Christensen recommendation. F/U scheduled for 1/31/22 at 1:45 p.m.

## 2021-12-21 NOTE — TELEPHONE ENCOUNTER
Manuelito Pickard with [facility] calling for HFU apt/timing with Dr. Imelda Bills discharged 12/20/21 dx: NSTEMI/cath. [phone] for HFU apt. Thank you.

## 2021-12-27 ENCOUNTER — HOSPITAL ENCOUNTER (OUTPATIENT)
Dept: OTHER | Age: 76
Setting detail: THERAPIES SERIES
Discharge: HOME | End: 2021-12-27

## 2022-01-07 ENCOUNTER — TELEPHONE (OUTPATIENT)
Dept: OTHER | Age: 77
End: 2022-01-07

## 2022-01-07 ENCOUNTER — HOSPITAL ENCOUNTER (OUTPATIENT)
Dept: OTHER | Age: 77
Setting detail: THERAPIES SERIES
Discharge: HOME | End: 2022-01-07

## 2022-01-07 NOTE — TELEPHONE ENCOUNTER
Call placed to [facility] due to patient missing CHF first appointment to establish with the clinic, pt was discharged to home. Spoke with ,\" she seems fine now\". I explained she was being monitored closely at the hospital and in the facility and should be established in the clinic to make sure she stays on track and out of the hospital. He says she will call us back. Gave number to the clinic. Will recall patient to see if will to come at a later date.

## 2022-01-07 NOTE — PROGRESS NOTES
Congestive Heart Failure [address] [place] [address]   1945          Referring Provider: ***  Primary Care Physician: Dr. Evelyn Luna  Cardiologist: Dr. Emma Almendarez  Nephrologist: ***        History of Present Illness: Jeanette Nathan is a 78 y.o. female with a history of HFpEF, most recent EF 40-45% 0n 12-13-21. Patient Story:    She {Blank single:22210::\"does\",\"does not\"}  have dyspnea with exertion, shortness of breath, or decline in overall functional capacity. She {Blank single:38539::\"does\",\"does not\"} have orthopnea, PND, nocturnal cough or hemoptysis. She {Blank single:21053::\"does\",\"does not\"} have abdominal distention or bloating, early satiety, anorexia/change in appetite. She {Blank single:71578::\"does\",\"does not\"} has a good urinary response to  oral diuretic. She {Blank single:05012::\"does\",\"does not\"} have  lower extremity edema. She denies lightheadedness, dizziness. She denies palpitations, syncope or near syncope. She {Blank single:69561::\"does\",\"does not\"} complain of chest pain, pressure, discomfort. No Known Allergies      No outpatient medications have been marked as taking for the 1/7/22 encounter [facility] Encounter) with [facility].           Guideline directed medical:  ARNI/ACE I/ARB: {Blank single:38965::\"Yes\",\"No\"}  Beta blocker:  {Blank single:71346::\"Yes\",\"No\"}  Aldosterone antagonist:  {Blank single:49524::\"Yes\",\"No\"}        Physical Examination:     There were no vitals taken for this visit.                                                                                                                                               LAB DATA:    Last 3 BMP      Sodium (mmol/L)   Date Value   12/20/2021 139   12/19/2021 136   12/18/2021 146     Potassium (mmol/L)   Date Value   12/20/2021 4.1   12/19/2021 3.8   12/18/2021 3.7     Potassium reflex Magnesium (mmol/L)   Date Value   12/13/2021 4.3   12/13/2021 5.0   06/14/2021 3.8     Chloride (mmol/L)   Date Value   12/20/2021 104   12/19/2021 103   12/18/2021 108 (H)     CO2 (mmol/L)   Date Value   12/20/2021 23   12/19/2021 22   12/18/2021 22     BUN (mg/dL)   Date Value   12/20/2021 26 (H)   12/19/2021 22   12/18/2021 20     Glucose (mg/dL)   Date Value   12/20/2021 246 (H)   12/19/2021 143 (H)   12/18/2021 168 (H)     Calcium (mg/dL)   Date Value   12/20/2021 8.8   12/19/2021 9.0   12/18/2021 8.9       Last 3 BNP       Pro-BNP (pg/mL)   Date Value   12/14/2021 43,447 (H)   12/13/2021 50,183 (H)   07/04/2015 223 (H)          CBC: No results for input(s): WBC, HGB, PLT in the last 72 hours. BMP:  No results for input(s): NA, K, CL, CO2, BUN, CREATININE, GLUCOSE in the last 72 hours. Hepatic: No results for input(s): AST, ALT, ALB, BILITOT, ALKPHOS in the last 72 hours. Troponin: No results for input(s): TROPONINI in the last 72 hours. BNP: No results for input(s): BNP in the last 72 hours. Lipids: No results for input(s): CHOL, HDL in the last 72 hours. Invalid input(s): LDLCALCU  INR: No results for input(s): INR in the last 72 hours. WEIGHTS:    Wt Readings from Last 3 Encounters:   12/20/21 188 lb 1.6 oz (85.3 kg)   12/16/21 196 lb (88.9 kg)   10/13/21 169 lb 11.2 oz (77 kg)         TELEMETRY:  Cardiac Regularity: {Blank single:19197::\"Regular\",\"Irregular\"}  Cardiac Rhythm/Interpretation: ***        ASSESSMENT:  Sanju Ritter is {Blank single:19197::\"evolemic\",\"hypervolemic\"} with {Blank single:19197::\"stable weights\",\"weight gain\"}   First visit with CHF clinic today. Patient presented with ***. Discussed with patient and *** the purpose of CHF clinic and importance of daily weights and doing a self check every day to monitor for changes. Went over the three heart failure zones and to call cardiologist if in yellow zone immediately to prevent any further decline. Patient has a working scale. Patient is agreeable to future CHF clinic visits.  Next 3 consecutive weekly appointments made today so that patient has a total of 4 visits within first 30 days. Interventions completed this visit:  IV diuretics given {YES/NO:19670}  Lab work obtained {YES/NO:19670}   Reviewed currently prescribed medications with patient, educated on importance of compliance and answered any questions regarding their medication  Educated on signs and symptoms of HF  Educated on low sodium diet    PLAN:  Scheduled to follow up in CHF clinic on   Future Appointments   Date Time Provider Rocio Pacheco   1/7/2022  8:30 AM [facility] ROOM 1 [facility]   1/31/2022  1:45 PM Tracie Martinez MD [facility]     Given clinic phone number and aware of signs and symptoms to call with any HF change in symptoms.

## 2022-01-13 NOTE — DISCHARGE SUMMARY
Physician Discharge Summary     Patient ID:  Abe Tejada  [id number]  73 y.o.  1945    Admit date: 12/13/2021    Discharge date and time: 12/20/2021  4:27 PM     Admission Diagnoses: Active Problems:    NSTEMI (non-ST elevated myocardial infarction) ([facility]  Resolved Problems:    * No resolved hospital problems. *      Discharge Diagnoses: Active Problems:    NSTEMI (non-ST elevated myocardial infarction) ([facility]  Resolved Problems:    * No resolved hospital problems. *      Condition at discharge : Stable    Consults: IP CONSULT TO CARDIOLOGY  IP CONSULT TO CRITICAL CARE  IP CONSULT TO HEART FAILURE NURSE/COORDINATOR  IP CONSULT TO NEPHROLOGY    Procedures: Heart cath    Hospital Course: Patient is a 73-year-old lady presents to the emergency room because of weakness, fall, shortness of breath. Patient apparently has been sick for a while. She reports that she has not been compliant with her diabetic medications. In the emergency room patient was noted to have leukocytosis. She tested negative for SARS-CoV-2 infection. She was noted to be in diabetic ketoacidosis. Also noted to have renal insufficiency. Liver function tests were elevated. Troponin was also elevated. Patient was felt to be having non-ST elevation MI. Cardiology consulted. She was admitted to the ICU. CT scan of the head was done because of confusion and this was negative. CT scan of the chest was negative for pulmonary embolism. She was started on empiric antibiotics for possible urinary tract infection. She was started on DKA protocol. She was on multiple psychotropic medications and these were adjusted. Patient was then recommended heart cath. This revealed 20% stenosis of the left main, occluded LAD with a patent LIMA to LAD, 50% left circumflex lesion in the ostial location. Patient was then recommended medical management. She was also noted to have urinary tract infection. Patient continued to have hypoxia. Renal function was stable post cath. Patient was recommended for subacute rehab placement. Patient was then discharged to subacute rehab in stable condition. CT ABDOMEN PELVIS WO CONTRAST Additional Contrast? None   Final Result   No acute process in the abdomen and pelvis. Probable cirrhotic liver with hemangioma in the left lobe. 2 cm left renal cyst.      Constipation. Fat containing ventral hernia, upper abdomen. Bibasilar atelectasis, lung bases. RECOMMENDATIONS:   Unavailable         CT HEAD WO CONTRAST   Final Result   No acute intracranial abnormality. RECOMMENDATIONS:   Unavailable         CT CERVICAL SPINE WO CONTRAST   Final Result   No acute abnormality of the cervical spine. Multilevel degenerative changes, as noted above. RECOMMENDATIONS:   Unavailable         CTA CHEST W CONTRAST   Final Result   No evidence of pulmonary embolism. Suspect cardiomegaly and pulmonary vascular congestion. RECOMMENDATIONS:   Unavailable         XR CHEST PORTABLE   Final Result   Pulmonary vascular congestion and suspect early CHF. Please correlate   clinically. XR PELVIS (1-2 VIEWS)   Final Result   No acute bony abnormality. No results found for this or any previous visit (from the past 336 hour(s)).       Discharge Exam:  See progress note from today    Disposition: Subacute rehab as tolerated    Patient Instructions:   Discharge Medication List as of 12/20/2021  4:01 PM      START taking these medications    Details   DULoxetine (CYMBALTA) 60 MG extended release capsule Take 1 capsule by mouth daily, Disp-30 capsule, R-3NO PRINT      insulin NPH (HUMULIN N;NOVOLIN N) 100 UNIT/ML injection vial Inject 10 Units into the skin 2 times daily (before meals), Disp-1 each, R-3NO PRINT      lisinopril (PRINIVIL;ZESTRIL) 5 MG tablet Take 1 tablet by mouth daily, Disp-30 tablet, R-3NO PRINT         CONTINUE these medications which have NOT CHANGED    Details   hydrOXYzine (VISTARIL) 25 MG capsule Take 25 mg by mouth 3 times daily as needed for Itching or AnxietyHistorical Med      atorvastatin (LIPITOR) 20 MG tablet Take 20 mg by mouth dailyHistorical Med      levothyroxine (SYNTHROID) 75 MCG tablet Take 75 mcg by mouth DailyHistorical Med      Multiple Vitamins-Minerals (PRESERVISION AREDS 2) CAPS Take 1 capsule by mouth 2 times dailyHistorical Med      dicyclomine (BENTYL) 10 MG capsule Take 10 mg by mouth 4 times daily (before meals and nightly)Historical Med      lipase-protease-amylase (CREON) 16065 units delayed release capsule Take 36,000 Units by mouth 3 times daily (with meals) , Oral, 3 TIMES DAILY WITH MEALS, Historical Med      metoprolol succinate (TOPROL XL) 25 MG extended release tablet Take 25 mg by mouth dailyHistorical Med      aspirin 81 MG tablet Take 81 mg by mouth dailyHistorical Med      omeprazole (PRILOSEC) 20 MG capsule Take 40 mg by mouth Daily  Historical Med         STOP taking these medications       ondansetron (ZOFRAN-ODT) 4 MG disintegrating tablet Comments:   Reason for Stopping:         tiZANidine (ZANAFLEX) 4 MG tablet Comments:   Reason for Stopping:         metaxalone (SKELAXIN) 800 MG tablet Comments:   Reason for Stopping:         alogliptin (NESINA) 6.25 MG TABS tablet Comments:   Reason for Stopping:         metFORMIN (GLUCOPHAGE) 500 MG tablet Comments:   Reason for Stopping:         pregabalin (LYRICA) 100 MG capsule Comments:   Reason for Stopping:         vilazodone HCl (VILAZODONE HCL) 40 MG TABS Comments:   Reason for Stopping:         LORazepam (ATIVAN) 0.5 MG tablet Comments:   Reason for Stopping:         raNITIdine (ZANTAC) 300 MG tablet Comments:   Reason for Stopping:         baclofen (LIORESAL) 20 MG tablet Comments:   Reason for Stopping:         loratadine (CLARITIN) 10 MG tablet Comments:   Reason for Stopping:         HYDROcodone-acetaminophen (NORCO) 5-325 MG per tablet Comments:   Reason for Stopping:         busPIRone (BUSPAR) 15 MG tablet Comments:   Reason for Stopping:         ARIPiprazole (ABILIFY) 5 MG tablet Comments:   Reason for Stopping:         alendronate (FOSAMAX) 70 MG tablet Comments:   Reason for Stopping:         lisinopril-hydrochlorothiazide (PRINZIDE;ZESTORETIC) 20-25 MG per tablet Comments:   Reason for Stopping:               Activity: As tolerated. Stable    Diet: Diabetic    Follow-up with Terra Sandoval MD  [address]  [phone]    Call  Please follow-up with Dr. Kimberly Chaidez within 1 month of being discharged.  [facility]  [facility] [address]   [place] [address] [address] , [address] [place] [phone]    Schedule an appointment as soon as possible for a visit  follow up        Note that over 30 minutes was spent in preparing discharge papers, discussing discharge with patient, medication review, etc.    Signed:  Candace Cosme MD  1/13/2022  12:30 PM

## 2023-08-14 ENCOUNTER — HOSPITAL ENCOUNTER (EMERGENCY)
Age: 78
Discharge: HOME | End: 2023-08-14
Attending: EMERGENCY MEDICINE
Payer: MEDICARE

## 2023-08-14 ENCOUNTER — APPOINTMENT (OUTPATIENT)
Dept: GENERAL RADIOLOGY | Age: 78
End: 2023-08-14
Payer: MEDICARE

## 2023-08-14 VITALS
TEMPERATURE: 97.5 F | RESPIRATION RATE: 14 BRPM | DIASTOLIC BLOOD PRESSURE: 82 MMHG | SYSTOLIC BLOOD PRESSURE: 148 MMHG | WEIGHT: 174 LBS | OXYGEN SATURATION: 96 % | HEIGHT: 61 IN | HEART RATE: 56 BPM | BODY MASS INDEX: 32.85 KG/M2

## 2023-08-14 DIAGNOSIS — K52.9 CHRONIC DIARRHEA: Primary | ICD-10-CM

## 2023-08-14 DIAGNOSIS — R11.0 NAUSEA: ICD-10-CM

## 2023-08-14 DIAGNOSIS — N30.01 ACUTE CYSTITIS WITH HEMATURIA: ICD-10-CM

## 2023-08-14 LAB
ALBUMIN SERPL-MCNC: 3.6 G/DL (ref 3.5–5.2)
ALP SERPL-CCNC: 99 U/L (ref 35–104)
ALT SERPL-CCNC: 13 U/L (ref 0–32)
ANION GAP SERPL CALCULATED.3IONS-SCNC: 11 MMOL/L (ref 7–16)
AST SERPL-CCNC: 16 U/L (ref 0–31)
BACTERIA URNS QL MICRO: ABNORMAL
BASOPHILS # BLD: 0.05 K/UL (ref 0–0.2)
BASOPHILS NFR BLD: 1 % (ref 0–2)
BILIRUB SERPL-MCNC: 0.5 MG/DL (ref 0–1.2)
BILIRUB UR QL STRIP: NEGATIVE
BUN SERPL-MCNC: 17 MG/DL (ref 6–23)
CALCIUM SERPL-MCNC: 9.3 MG/DL (ref 8.6–10.2)
CHLORIDE SERPL-SCNC: 105 MMOL/L (ref 98–107)
CLARITY UR: ABNORMAL
CO2 SERPL-SCNC: 25 MMOL/L (ref 22–29)
COLOR UR: YELLOW
CREAT SERPL-MCNC: 0.9 MG/DL (ref 0.5–1)
EKG ATRIAL RATE: 50 BPM
EKG P AXIS: 44 DEGREES
EKG P-R INTERVAL: 236 MS
EKG Q-T INTERVAL: 504 MS
EKG QRS DURATION: 96 MS
EKG QTC CALCULATION (BAZETT): 459 MS
EKG R AXIS: 28 DEGREES
EKG T AXIS: 28 DEGREES
EKG VENTRICULAR RATE: 50 BPM
EOSINOPHIL # BLD: 0.18 K/UL (ref 0.05–0.5)
EOSINOPHILS RELATIVE PERCENT: 2 % (ref 0–6)
ERYTHROCYTE [DISTWIDTH] IN BLOOD BY AUTOMATED COUNT: 14 % (ref 11.5–15)
GFR SERPL CREATININE-BSD FRML MDRD: >60 ML/MIN/1.73M2
GLUCOSE SERPL-MCNC: 222 MG/DL (ref 74–99)
GLUCOSE UR STRIP-MCNC: NEGATIVE MG/DL
HCT VFR BLD AUTO: 41.2 % (ref 34–48)
HGB BLD-MCNC: 13.8 G/DL (ref 11.5–15.5)
HGB UR QL STRIP.AUTO: ABNORMAL
IMM GRANULOCYTES # BLD AUTO: 0.04 K/UL (ref 0–0.58)
IMM GRANULOCYTES NFR BLD: 1 % (ref 0–5)
KETONES UR STRIP-MCNC: NEGATIVE MG/DL
LACTATE BLDV-SCNC: 2.3 MMOL/L (ref 0.5–2.2)
LEUKOCYTE ESTERASE UR QL STRIP: ABNORMAL
LIPASE SERPL-CCNC: 23 U/L (ref 13–60)
LYMPHOCYTES NFR BLD: 1.78 K/UL (ref 1.5–4)
LYMPHOCYTES RELATIVE PERCENT: 23 % (ref 20–42)
MCH RBC QN AUTO: 31.2 PG (ref 26–35)
MCHC RBC AUTO-ENTMCNC: 33.5 G/DL (ref 32–34.5)
MCV RBC AUTO: 93.2 FL (ref 80–99.9)
MONOCYTES NFR BLD: 0.41 K/UL (ref 0.1–0.95)
MONOCYTES NFR BLD: 5 % (ref 2–12)
NEUTROPHILS NFR BLD: 68 % (ref 43–80)
NEUTS SEG NFR BLD: 5.3 K/UL (ref 1.8–7.3)
NITRITE UR QL STRIP: POSITIVE
PH UR STRIP: 6 [PH] (ref 5–9)
PLATELET # BLD AUTO: 225 K/UL (ref 130–450)
PMV BLD AUTO: 9.3 FL (ref 7–12)
POTASSIUM SERPL-SCNC: 3.6 MMOL/L (ref 3.5–5)
PROT SERPL-MCNC: 6.3 G/DL (ref 6.4–8.3)
PROT UR STRIP-MCNC: NEGATIVE MG/DL
RBC # BLD AUTO: 4.42 M/UL (ref 3.5–5.5)
RBC #/AREA URNS HPF: ABNORMAL /HPF
SODIUM SERPL-SCNC: 141 MMOL/L (ref 132–146)
SP GR UR STRIP: 1.02 (ref 1–1.03)
TROPONIN I SERPL HS-MCNC: 23 NG/L (ref 0–9)
UROBILINOGEN UR STRIP-ACNC: 0.2 EU/DL (ref 0–1)
WBC #/AREA URNS HPF: ABNORMAL /HPF
WBC OTHER # BLD: 7.8 K/UL (ref 4.5–11.5)

## 2023-08-14 PROCEDURE — 85025 COMPLETE CBC W/AUTO DIFF WBC: CPT

## 2023-08-14 PROCEDURE — 83605 ASSAY OF LACTIC ACID: CPT

## 2023-08-14 PROCEDURE — 74022 RADEX COMPL AQT ABD SERIES: CPT

## 2023-08-14 PROCEDURE — 83690 ASSAY OF LIPASE: CPT

## 2023-08-14 PROCEDURE — 99285 EMERGENCY DEPT VISIT HI MDM: CPT

## 2023-08-14 PROCEDURE — 81001 URINALYSIS AUTO W/SCOPE: CPT

## 2023-08-14 PROCEDURE — 84484 ASSAY OF TROPONIN QUANT: CPT

## 2023-08-14 PROCEDURE — 6360000002 HC RX W HCPCS: Performed by: EMERGENCY MEDICINE

## 2023-08-14 PROCEDURE — 93005 ELECTROCARDIOGRAM TRACING: CPT | Performed by: EMERGENCY MEDICINE

## 2023-08-14 PROCEDURE — 93010 ELECTROCARDIOGRAM REPORT: CPT | Performed by: INTERNAL MEDICINE

## 2023-08-14 PROCEDURE — 96374 THER/PROPH/DIAG INJ IV PUSH: CPT

## 2023-08-14 PROCEDURE — 80053 COMPREHEN METABOLIC PANEL: CPT

## 2023-08-14 PROCEDURE — 2580000003 HC RX 258: Performed by: EMERGENCY MEDICINE

## 2023-08-14 RX ORDER — METRONIDAZOLE 500 MG/1
500 TABLET ORAL 4 TIMES DAILY
Qty: 40 TABLET | Refills: 0 | Status: SHIPPED | OUTPATIENT
Start: 2023-08-14 | End: 2023-08-24

## 2023-08-14 RX ORDER — 0.9 % SODIUM CHLORIDE 0.9 %
1000 INTRAVENOUS SOLUTION INTRAVENOUS ONCE
Status: DISCONTINUED | OUTPATIENT
Start: 2023-08-14 | End: 2023-08-14

## 2023-08-14 RX ORDER — ONDANSETRON 2 MG/ML
4 INJECTION INTRAMUSCULAR; INTRAVENOUS ONCE
Status: COMPLETED | OUTPATIENT
Start: 2023-08-14 | End: 2023-08-14

## 2023-08-14 RX ORDER — 0.9 % SODIUM CHLORIDE 0.9 %
500 INTRAVENOUS SOLUTION INTRAVENOUS ONCE
Status: COMPLETED | OUTPATIENT
Start: 2023-08-14 | End: 2023-08-14

## 2023-08-14 RX ORDER — ONDANSETRON 4 MG/1
4 TABLET, FILM COATED ORAL EVERY 8 HOURS PRN
Qty: 20 TABLET | Refills: 0 | Status: SHIPPED | OUTPATIENT
Start: 2023-08-14

## 2023-08-14 RX ORDER — CEFDINIR 300 MG/1
300 CAPSULE ORAL 2 TIMES DAILY
Qty: 14 CAPSULE | Refills: 0 | Status: SHIPPED | OUTPATIENT
Start: 2023-08-14 | End: 2023-08-21

## 2023-08-14 RX ADMIN — SODIUM CHLORIDE 500 ML: 9 INJECTION, SOLUTION INTRAVENOUS at 11:04

## 2023-08-14 RX ADMIN — ONDANSETRON 4 MG: 2 INJECTION INTRAMUSCULAR; INTRAVENOUS at 11:05

## 2023-08-14 ASSESSMENT — ENCOUNTER SYMPTOMS
PHOTOPHOBIA: 0
SHORTNESS OF BREATH: 0
DIARRHEA: 1
COLOR CHANGE: 0
NAUSEA: 1
COUGH: 0
BACK PAIN: 0
ABDOMINAL PAIN: 0
RHINORRHEA: 0

## 2023-08-14 ASSESSMENT — PAIN - FUNCTIONAL ASSESSMENT
PAIN_FUNCTIONAL_ASSESSMENT: NONE - DENIES PAIN
PAIN_FUNCTIONAL_ASSESSMENT: NONE - DENIES PAIN

## 2023-08-14 ASSESSMENT — LIFESTYLE VARIABLES: HOW OFTEN DO YOU HAVE A DRINK CONTAINING ALCOHOL: NEVER

## 2023-08-14 NOTE — DISCHARGE INSTRUCTIONS
Call family doctor and GI doctor tomorrow and schedule a followup appointment    Have your doctor obtain  finalized report of all results

## 2023-08-14 NOTE — ED PROVIDER NOTES
[address] ENCOUNTER        Pt Name: Roxy Stapleton  MRN: [id number]  [facility] 1945  Date of evaluation: 8/14/2023  Provider: Mirna Almendarez DO  PCP: Nora Stone MD  Note Started: 10:32 AM EDT 8/14/23    CHIEF COMPLAINT       Chief Complaint   Patient presents with    Nausea    Emesis     X 2 months       HISTORY OF PRESENT ILLNESS: 1 or more Elements     History from : Patient    Limitations to history : None    Roxy Stapleton is a 70 y.o. female who presents for nausea with diarrhea for 2 months,. Pt notes watery stool 2x per day for 2 months. Pt denies any melena or hematochezia. Pt notes she has been nauseated as well. Pt notes she hasnt seen anyone and its not gettingg any better so she decideded to come in. Pt is not on laxatives or stool softeners. She denies at cp, sob, cough or cold sx, vomiting, abdominal pain. Pt denies any uti sx    Nursing Notes were all reviewed and agreed with or any disagreements were addressed in the HPI. REVIEW OF SYSTEMS :      Review of Systems   Constitutional:  Negative for appetite change, fatigue and fever. HENT:  Negative for congestion, postnasal drip and rhinorrhea. Eyes:  Negative for photophobia and visual disturbance. Respiratory:  Negative for cough and shortness of breath. Cardiovascular:  Negative for chest pain and palpitations. Gastrointestinal:  Positive for diarrhea and nausea. Negative for abdominal pain. Endocrine: Negative for polyuria. Genitourinary:  Negative for difficulty urinating and dysuria. Musculoskeletal:  Negative for back pain and neck pain. Skin:  Negative for color change and pallor. Allergic/Immunologic: Negative for food allergies and immunocompromised state. Neurological:  Negative for dizziness, numbness and headaches. Hematological:  Negative for adenopathy. Positives and Pertinent negatives as per HPI.      SURGICAL

## 2023-08-15 ENCOUNTER — HOSPITAL ENCOUNTER (OUTPATIENT)
Age: 78
Discharge: HOME | End: 2023-08-15
Payer: MEDICARE

## 2023-08-15 PROCEDURE — 87046 STOOL CULTR AEROBIC BACT EA: CPT

## 2023-08-15 PROCEDURE — 87427 SHIGA-LIKE TOXIN AG IA: CPT

## 2023-08-15 PROCEDURE — 87045 FECES CULTURE AEROBIC BACT: CPT

## 2023-08-15 PROCEDURE — 36415 COLL VENOUS BLD VENIPUNCTURE: CPT

## 2023-08-15 PROCEDURE — 87077 CULTURE AEROBIC IDENTIFY: CPT

## 2023-08-17 LAB
MICROORGANISM SPEC CULT: ABNORMAL
MICROORGANISM SPEC CULT: ABNORMAL
SPECIMEN DESCRIPTION: ABNORMAL

## 2024-10-22 ENCOUNTER — HOSPITAL ENCOUNTER (OUTPATIENT)
Age: 79
Discharge: HOME | End: 2024-10-24

## 2024-10-22 PROCEDURE — 87046 STOOL CULTR AEROBIC BACT EA: CPT

## 2024-10-22 PROCEDURE — 87045 FECES CULTURE AEROBIC BACT: CPT

## 2024-10-22 PROCEDURE — 87427 SHIGA-LIKE TOXIN AG IA: CPT

## 2024-10-24 LAB
MICROORGANISM SPEC CULT: NORMAL
MICROORGANISM SPEC CULT: NORMAL
SPECIMEN DESCRIPTION: NORMAL

## 2024-12-16 NOTE — PLAN OF CARE
Occupational Therapy    OT Treatment    Patient Name: Alanna Hickman  MRN: [id number]  Department: [address]  Room: [address] [place] [address]  Today's Date: 12/16/2024  Time Calculation  Start Time: 1357  Stop Time: 1422  Time Calculation (min): 25 min        Assessment:  OT Assessment: Pt is making progress towards established OT goals. Pt cooperative in therapy session, limited by increased O2 demands. Pt would benefit from continued acute OT services to address deficits in ADLs/IADLs, functional mobility, and transfers  End of Session Communication: Bedside nurse  End of Session Patient Position: Bed, 2 rail up, Alarm off, not on at start of session (all needs in reach, spoke with RN, pt cleared to sit EOB without alarm on)  OT Assessment Results: Decreased ADL status, Decreased cognition, Decreased endurance, Decreased functional mobility  Strengths: Ability to acquire knowledge  Barriers to Participation: Comorbidities  Plan:  Treatment Interventions: ADL retraining, Functional transfer training, UE strengthening/ROM, Endurance training, Equipment evaluation/education, Compensatory technique education  OT Frequency: 3 times per week  OT Discharge Recommendations: Moderate intensity level of continued care  Equipment Recommended upon Discharge: Bedside commode  OT - OK to Discharge: Yes  Treatment Interventions: ADL retraining, Functional transfer training, UE strengthening/ROM, Endurance training, Equipment evaluation/education, Compensatory technique education    Subjective   Previous Visit Info:  OT Last Visit  OT Received On: 12/16/24  General:  General  Past Medical History Relevant to Rehab: asthma, CVA on plavix, DM, tobacco smoker, DM with neuroapthy, RA, anklyosing spondylitis (HLA-B27 positve), severe SANGEETA, RA, fibromyalgia, hereditary thrombophilia  Family/Caregiver Present: No  Prior to Session Communication: Bedside nurse  Patient Position Received: Bed, 2 rail up, Alarm off, not on at start of session (seated  Problem: Falls - Risk of:  Goal: Will remain free from falls  Description: Will remain free from falls  Outcome: Ongoing  Goal: Absence of physical injury  Description: Absence of physical injury  Outcome: Ongoing     Problem: Pain:  Goal: Pain level will decrease  Description: Pain level will decrease  Outcome: Ongoing EOB)  General Comment: Cleared by nursing. Pt pleasant and agreeable to therapy  Precautions:  Medical Precautions: Fall precautions, Oxygen therapy device and L/min, Infection precautions (15LO2 via NC)    Vital Signs Comment: HR 97, SpO2 %     Pain:  Pain Assessment  Pain Assessment: 0-10  0-10 (Numeric) Pain Score: 0 - No pain    Objective    Cognition:  Cognition  Overall Cognitive Status: Within Functional Limits     Activities of Daily Living:      Grooming  Grooming Level of Assistance: Close supervision, Setup  Grooming Where Assessed: Edge of bed  Grooming Comments: washing face    UE Bathing  UE Bathing Level of Assistance: Close supervision, Setup  UE Bathing Where Assessed: Edge of bed  UE Bathing Comments: washing UB    LE Dressing  LE Dressing: Yes  Shoe Level of Assistance: Setup, Close supervision  LE Dressing Where Assessed: Edge of bed  LE Dressing Comments: donning slippers    Toileting  Toileting Level of Assistance: Close supervision  Where Assessed: Bedside commode  Toileting Comments: close supervision for safety, able to manage LB clothing and perform ta care seated     Bed Mobility/Transfers: Bed Mobility  Bed Mobility: No    Transfers  Transfer:  (close supervision for safety for sit<>stand bed level)    Toilet Transfers  Toilet Transfer Type: To and from  Toilet Transfer to: Standard bedside commode  Toilet Transfer Technique: Ambulating  Toilet Transfers: Supervision     Functional Mobility:  Functional Mobility  Functional Mobility Performed:  (close supervision for safety for functional mobility ~7-8 steps without use of AD, no LOB noted, limited by O2 line)  Sitting Balance:  Static Sitting Balance  Static Sitting-Level of Assistance: Independent  Standing Balance:  Static Standing Balance  Static Standing-Level of Assistance: Close supervision     Therapy/Activity: Therapeutic Exercise  Therapeutic Exercise Performed:  (pt performed 10-15 reps x1 set BUE exercises (shoulder  flexion, bicep curls, horizontal abduction, and shoulder presses) to improve strength and indep with ADLs/functional transfers)     Outcome Measures:[facility] Daily Activity  Putting on and taking off regular lower body clothing: A little  Bathing (including washing, rinsing, drying): A lot  Putting on and taking off regular upper body clothing: A little  Toileting, which includes using toilet, bedpan or urinal: A little  Taking care of personal grooming such as brushing teeth: None  Eating Meals: None  Daily Activity - Total Score: 19    Education Documentation  ADL Training, taught by Rose Marie Mcclelland OT at 12/16/2024  3:11 PM.  Learner: Patient  Readiness: Acceptance  Method: Explanation, Demonstration  Response: Verbalizes Understanding    Education Comments  No comments found.    Goals:  Encounter Problems       Encounter Problems (Active)       ADLs       Patient with complete upper body dressing with independent level of assistance donning and doffing all UE clothes while edge of bed  (Not Progressing)       Start:  12/13/24    Expected End:  01/13/25            Patient with complete lower body dressing with modified independent level of assistance donning and doffing all LE clothes  with PRN adaptive equipment while edge of bed  (Progressing)       Start:  12/13/24    Expected End:  01/13/25            Patient will complete daily grooming tasks with independent level of assistance and PRN adaptive equipment while standing. (Progressing)       Start:  12/13/24    Expected End:  01/13/25            Patient will complete toileting including hygiene clothing management/hygiene with modified independent level of assistance and grab bars. (Progressing)       Start:  12/13/24    Expected End:  01/13/25               MOBILITY       Patient will perform Functional mobility mod  Household distances/Community Distances with modified independent level of assistance and least restrictive device in order to improve  safety and functional mobility. (Progressing)       Start:  12/13/24    Expected End:  01/13/25